# Patient Record
Sex: FEMALE | Race: ASIAN | Employment: OTHER | URBAN - METROPOLITAN AREA
[De-identification: names, ages, dates, MRNs, and addresses within clinical notes are randomized per-mention and may not be internally consistent; named-entity substitution may affect disease eponyms.]

---

## 2017-01-18 ENCOUNTER — HOSPITAL ENCOUNTER (EMERGENCY)
Age: 82
Discharge: HOME | End: 2017-01-18
Attending: EMERGENCY MEDICINE
Payer: MEDICARE

## 2017-01-18 VITALS
DIASTOLIC BLOOD PRESSURE: 60 MMHG | SYSTOLIC BLOOD PRESSURE: 123 MMHG | HEART RATE: 97 BPM | RESPIRATION RATE: 14 BRPM | HEIGHT: 60 IN | TEMPERATURE: 98.8 F | WEIGHT: 89 LBS | OXYGEN SATURATION: 95 % | BODY MASS INDEX: 17.47 KG/M2

## 2017-01-18 DIAGNOSIS — N39.0 URINARY TRACT INFECTION ASSOCIATED WITH INDWELLING URETHRAL CATHETER, INITIAL ENCOUNTER (HCC): ICD-10-CM

## 2017-01-18 DIAGNOSIS — T83.511A URINARY TRACT INFECTION ASSOCIATED WITH INDWELLING URETHRAL CATHETER, INITIAL ENCOUNTER (HCC): ICD-10-CM

## 2017-01-18 DIAGNOSIS — T83.9XXA FOLEY CATHETER PROBLEM, INITIAL ENCOUNTER (HCC): Primary | ICD-10-CM

## 2017-01-18 LAB
AMORPH CRY URNS QL MICRO: ABNORMAL
APPEARANCE UR: ABNORMAL
BACTERIA URNS QL MICRO: ABNORMAL /HPF
BILIRUB UR QL: NEGATIVE
CAOX CRY URNS QL MICRO: ABNORMAL
COLOR UR: ABNORMAL
EPITH CASTS URNS QL MICRO: ABNORMAL /LPF
GLUCOSE UR STRIP.AUTO-MCNC: NEGATIVE MG/DL
GRAN CASTS URNS QL MICRO: ABNORMAL /LPF
HGB UR QL STRIP: NEGATIVE
KETONES UR QL STRIP.AUTO: NEGATIVE MG/DL
LEUKOCYTE ESTERASE UR QL STRIP.AUTO: ABNORMAL
NITRITE UR QL STRIP.AUTO: POSITIVE
PH UR STRIP: 7.5 [PH] (ref 5–8)
PROT UR STRIP-MCNC: NEGATIVE MG/DL
RBC #/AREA URNS HPF: ABNORMAL /HPF (ref 0–5)
SP GR UR REFRACTOMETRY: 1.01 (ref 1–1.03)
UA: UC IF INDICATED,UAUC: ABNORMAL
UROBILINOGEN UR QL STRIP.AUTO: 1 EU/DL (ref 0.2–1)
WBC URNS QL MICRO: ABNORMAL /HPF (ref 0–4)

## 2017-01-18 PROCEDURE — 99282 EMERGENCY DEPT VISIT SF MDM: CPT

## 2017-01-18 PROCEDURE — 87186 SC STD MICRODIL/AGAR DIL: CPT | Performed by: EMERGENCY MEDICINE

## 2017-01-18 PROCEDURE — 51702 INSERT TEMP BLADDER CATH: CPT

## 2017-01-18 PROCEDURE — 77030034850

## 2017-01-18 PROCEDURE — 87086 URINE CULTURE/COLONY COUNT: CPT | Performed by: EMERGENCY MEDICINE

## 2017-01-18 PROCEDURE — 87077 CULTURE AEROBIC IDENTIFY: CPT | Performed by: EMERGENCY MEDICINE

## 2017-01-18 PROCEDURE — 81001 URINALYSIS AUTO W/SCOPE: CPT | Performed by: EMERGENCY MEDICINE

## 2017-01-18 RX ORDER — CIPROFLOXACIN 500 MG/1
500 TABLET ORAL 2 TIMES DAILY
Qty: 20 TAB | Refills: 0 | Status: SHIPPED | OUTPATIENT
Start: 2017-01-18 | End: 2017-01-28

## 2017-01-18 NOTE — Clinical Note
You will need to follow up with own MD for continued management of this catheter. I would recommend you follow up with the urologist with this ongoing gramajo catheter problems.

## 2017-01-18 NOTE — ED TRIAGE NOTES
Triage Note:  Pt arrived with son. Son states that the patient is experiencing pain around her urinary catheter. Pt speaks [place]. Pain began this AM.  Son states the catheter has been in for one year.

## 2017-01-18 NOTE — ED PROVIDER NOTES
HPI Comments: 80 y.o. female with past medical history significant for HTN. Pneumonia, vertigo, GERD, hypercholesterolemia, intracerebral bleed, vancomycin resistant enterococcus culture positive who presents accompanied by son with chief complaint of urinary pain. Per son, pt is experiencing urinary pain secondary to her catheter which has \"been in place for one year. \" Pt's urine is cloudy. Son denies that pt is leaking around catheter. Son denies that pt has recently been seen by nephrology. Son denies fever, abd pain. There are no other acute medical concerns at this time. Old Chart Review:  10:10  Pt has h/o recurrent UTIs. She had her catheter changed in 10/2016 and 12/2016. PCP: Arthur Tabor MD    Note written by Caitlyn Wallace, as dictated by Edith Fragoso MD 10:15 AM      The history is provided by a relative. No  was used. Past Medical History:   Diagnosis Date    Gastrointestinal disorder      GERD    Hypercholesterolemia     Hypertension     Intracerebral bleed ([facility] [address]     Pneumonia, organism unspecified 5/10/2013    Vertigo     VRE (vancomycin resistant enterococcus) culture positive        Past Surgical History:   Procedure Laterality Date    Pr neurological procedure unlisted       brain surgery         History reviewed. No pertinent family history. Social History     Social History    Marital status: SINGLE     Spouse name: N/A    Number of children: N/A    Years of education: N/A     Occupational History    Not on file. Social History Main Topics    Smoking status: Never Smoker    Smokeless tobacco: Not on file    Alcohol use No    Drug use: No    Sexual activity: Not on file     Other Topics Concern    Not on file     Social History Narrative         ALLERGIES: Aspirin    Review of Systems   Constitutional: Negative for activity change, appetite change and fatigue.    HENT: Negative for ear pain, facial swelling, sore throat and trouble swallowing. Eyes: Negative for pain, discharge and visual disturbance. Respiratory: Negative for chest tightness, shortness of breath and wheezing. Cardiovascular: Negative for chest pain and palpitations. Gastrointestinal: Negative for abdominal pain, blood in stool, nausea and vomiting. Genitourinary: Positive for dysuria. Negative for flank pain and hematuria. Musculoskeletal: Negative for arthralgias, joint swelling, myalgias and neck pain. Skin: Negative for color change and rash. Neurological: Negative for dizziness, weakness, numbness and headaches. Hematological: Negative for adenopathy. Does not bruise/bleed easily. Psychiatric/Behavioral: Negative for behavioral problems, confusion and sleep disturbance. All other systems reviewed and are negative. Vitals:    01/18/17 0947   BP: 123/60   Pulse: (!) 105   Resp: 14   Temp: 98.8 °F (37.1 °C)   SpO2: 95%   Weight: 40.4 kg (89 lb)   Height: 5' (1.524 m)            Physical Exam   Constitutional: She is oriented to person, place, and time. She appears well-developed and well-nourished. No distress. HENT:   Head: Normocephalic and atraumatic. Nose: Nose normal.   Mouth/Throat: Oropharynx is clear and moist.   Eyes: Conjunctivae and EOM are normal. Pupils are equal, round, and reactive to light. No scleral icterus. Neck: Normal range of motion. Neck supple. No JVD present. No tracheal deviation present. No thyromegaly present. No carotid bruits noted. Cardiovascular: Normal rate, regular rhythm, normal heart sounds and intact distal pulses. Exam reveals no gallop and no friction rub. No murmur heard. Pulmonary/Chest: Effort normal and breath sounds normal. No respiratory distress. She has no wheezes. She has no rales. She exhibits no tenderness. Abdominal: Soft. Bowel sounds are normal. She exhibits no distension and no mass. There is no tenderness. There is no rebound and no guarding.    Musculoskeletal: Normal range of motion. She exhibits no edema or tenderness. Lymphadenopathy:     She has no cervical adenopathy. Neurological: She is alert and oriented to person, place, and time. She has normal reflexes. No cranial nerve deficit. Coordination normal.   Skin: Skin is warm and dry. No rash noted. No erythema. Psychiatric: She has a normal mood and affect. Her behavior is normal. Judgment and thought content normal.   Nursing note and vitals reviewed. MDM  Number of Diagnoses or Management Options  Chambers catheter problem, initial encounter [facility]): new and requires workup  Urinary tract infection associated with indwelling urethral catheter, initial encounter: new and requires workup     Amount and/or Complexity of Data Reviewed  Clinical lab tests: ordered and reviewed  Decide to obtain previous medical records or to obtain history from someone other than the patient: yes  Obtain history from someone other than the patient: yes  Review and summarize past medical records: yes    Risk of Complications, Morbidity, and/or Mortality  Presenting problems: moderate  Diagnostic procedures: moderate  Management options: moderate    Patient Progress  Patient progress: stable    ED Course       Procedures    PROGRESS NOTE:  10:20 AM  In December, 2016 pt's urine grew serratia that was pan sensitive. Will start the pt on cipro. PROGRESS NOTE:  10:31 AM  UA indicates infection. Pt's catheter has been changed.  She will be d/c with abx and should follow-up with her PCP and Dr. Gabby Santacruz, her nephrologist.

## 2017-01-18 NOTE — ED NOTES
Pt and Son given discharge instructions, patient education, prescriptions, and follow up information. Pt and son state understanding. All questions answered. Pt discharged to home in taxi, pt wheeled out in wheelchair. Pain improved.

## 2017-01-21 LAB
BACTERIA SPEC CULT: NORMAL
CC UR VC: NORMAL
SERVICE CMNT-IMP: NORMAL

## 2017-02-01 ENCOUNTER — HOSPITAL ENCOUNTER (EMERGENCY)
Age: 82
Discharge: HOME | End: 2017-02-01
Attending: EMERGENCY MEDICINE
Payer: MEDICARE

## 2017-02-01 ENCOUNTER — HOME HEALTH ADMISSION (OUTPATIENT)
Dept: HOME HEALTH SERVICES | Facility: HOME HEALTH | Age: 82
End: 2017-02-01

## 2017-02-01 VITALS
BODY MASS INDEX: 17.47 KG/M2 | SYSTOLIC BLOOD PRESSURE: 135 MMHG | OXYGEN SATURATION: 93 % | TEMPERATURE: 98.9 F | HEIGHT: 60 IN | DIASTOLIC BLOOD PRESSURE: 85 MMHG | WEIGHT: 89 LBS | HEART RATE: 79 BPM | RESPIRATION RATE: 18 BRPM

## 2017-02-01 DIAGNOSIS — T83.511S URINARY TRACT INFECTION ASSOCIATED WITH INDWELLING URETHRAL CATHETER, SEQUELA: ICD-10-CM

## 2017-02-01 DIAGNOSIS — Z97.8 CHRONIC INDWELLING FOLEY CATHETER: Primary | ICD-10-CM

## 2017-02-01 DIAGNOSIS — N39.0 URINARY TRACT INFECTION ASSOCIATED WITH INDWELLING URETHRAL CATHETER, SEQUELA: ICD-10-CM

## 2017-02-01 LAB
APPEARANCE UR: ABNORMAL
BACTERIA URNS QL MICRO: ABNORMAL /HPF
BILIRUB UR QL: NEGATIVE
COLOR UR: ABNORMAL
EPITH CASTS URNS QL MICRO: ABNORMAL /LPF
GLUCOSE UR STRIP.AUTO-MCNC: NEGATIVE MG/DL
HGB UR QL STRIP: ABNORMAL
KETONES UR QL STRIP.AUTO: NEGATIVE MG/DL
LEUKOCYTE ESTERASE UR QL STRIP.AUTO: ABNORMAL
NITRITE UR QL STRIP.AUTO: NEGATIVE
PH UR STRIP: 6 [PH] (ref 5–8)
PROT UR STRIP-MCNC: NEGATIVE MG/DL
RBC #/AREA URNS HPF: ABNORMAL /HPF (ref 0–5)
SP GR UR REFRACTOMETRY: 1.02 (ref 1–1.03)
UA: UC IF INDICATED,UAUC: ABNORMAL
UROBILINOGEN UR QL STRIP.AUTO: 0.2 EU/DL (ref 0.2–1)
WBC URNS QL MICRO: ABNORMAL /HPF (ref 0–4)

## 2017-02-01 PROCEDURE — 51702 INSERT TEMP BLADDER CATH: CPT

## 2017-02-01 PROCEDURE — 87186 SC STD MICRODIL/AGAR DIL: CPT | Performed by: EMERGENCY MEDICINE

## 2017-02-01 PROCEDURE — 81001 URINALYSIS AUTO W/SCOPE: CPT | Performed by: EMERGENCY MEDICINE

## 2017-02-01 PROCEDURE — 77030034850

## 2017-02-01 PROCEDURE — 87077 CULTURE AEROBIC IDENTIFY: CPT | Performed by: EMERGENCY MEDICINE

## 2017-02-01 PROCEDURE — 99283 EMERGENCY DEPT VISIT LOW MDM: CPT

## 2017-02-01 PROCEDURE — 87086 URINE CULTURE/COLONY COUNT: CPT | Performed by: EMERGENCY MEDICINE

## 2017-02-01 RX ORDER — CEPHALEXIN 500 MG/1
500 CAPSULE ORAL 2 TIMES DAILY
Qty: 14 CAP | Refills: 0 | Status: SHIPPED | OUTPATIENT
Start: 2017-02-01 | End: 2017-02-08

## 2017-02-01 NOTE — ED PROVIDER NOTES
HPI Comments: 80 y.o. female with past medical history significant for HTN. 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 Appetite with good. Pt denies fevers, chills, night sweats, chest pain, pressure, SOB, SCHMID, PND, orthopnea, abdominal pain, n/v/d, melena, hematuria, dysuria, constipation, HA, dizziness, and syncope      Per nursing this is chronic problem. When pt arrives the gramajo is never in place correctly. Gramajo  is missing. Son is unhelpful with history. Gramajo exchanged on 1/17 with urine growing serratia which was treated with Cipro. Past Medical History:    Gastrointestinal disorder                                       Comment:GERD    Hypercholesterolemia                                          Hypertension                                                  Intracerebral bleed (HCC)                                     Pneumonia, organism unspecified                 5/10/2013     Vertigo                                                       VRE (vancomycin resistant enterococcus) cultur*               Past Surgical History:    [place] NEUROLOGICAL PROCEDURE UNLISTED                               Comment:brain surgery    PCP:  Kendra Valle MD          Patient is a 80 y.o. female presenting with urinary pain. Urinary Pain    Pertinent negatives include no chills, no nausea, no vomiting, no frequency, no hematuria, no urgency, no flank pain, no abdominal pain and no back pain.         Past Medical History:   Diagnosis Date    Gastrointestinal disorder      GERD    Hypercholesterolemia     Hypertension     Intracerebral bleed ([address]     Pneumonia, organism unspecified 5/10/2013    Vertigo     VRE (vancomycin resistant enterococcus) culture positive        Past Surgical History:   Procedure Laterality Date    Pr neurological procedure unlisted       brain surgery         History reviewed. No pertinent family history. Social History     Social History    Marital status: SINGLE     Spouse name: N/A    Number of children: N/A    Years of education: N/A     Occupational History    Not on file. Social History Main Topics    Smoking status: Never Smoker    Smokeless tobacco: Not on file    Alcohol use No    Drug use: No    Sexual activity: Not on file     Other Topics Concern    Not on file     Social History Narrative         ALLERGIES: Aspirin    Review of Systems   Constitutional: Negative for activity change, appetite change, chills, diaphoresis, fatigue, fever and unexpected weight change. HENT: Negative for congestion, ear pain, rhinorrhea, sinus pressure, sore throat and tinnitus. Eyes: Negative for photophobia, pain, discharge and visual disturbance. Respiratory: Negative for apnea, cough, choking, chest tightness, shortness of breath, wheezing and stridor. Cardiovascular: Negative for chest pain, palpitations and leg swelling. Gastrointestinal: Negative for abdominal pain, constipation, diarrhea, nausea and vomiting. Endocrine: Negative for polydipsia, polyphagia and polyuria. Genitourinary: Negative for decreased urine volume, dyspareunia, dysuria, enuresis, flank pain, frequency, hematuria and urgency. Pain around the catheter site   Musculoskeletal: Negative for arthralgias, back pain, gait problem, myalgias and neck pain. Skin: Negative for color change, pallor, rash and wound. Allergic/Immunologic: Negative for immunocompromised state. Neurological: Negative for dizziness, seizures, syncope, weakness, light-headedness and headaches. Hematological: Does not bruise/bleed easily. Psychiatric/Behavioral: Negative for agitation and confusion. The patient is not nervous/anxious.         Vitals:    02/01/17 0913   BP: 120/74   Pulse: 78   Resp: 17 Temp: 98.5 °F (36.9 °C)   SpO2: 94%   Weight: 40.4 kg (89 lb)   Height: 5' (1.524 m)            Physical Exam   Constitutional: She is oriented to person, place, and time. She appears well-developed and well-nourished. No distress. HENT:   Head: Normocephalic. Right Ear: External ear normal.   Left Ear: External ear normal.   Mouth/Throat: Oropharynx is clear and moist. No oropharyngeal exudate. Eyes: Conjunctivae and EOM are normal. Pupils are equal, round, and reactive to light. Right eye exhibits no discharge. Left eye exhibits no discharge. No scleral icterus. Neck: Normal range of motion. Neck supple. No JVD present. No tracheal deviation present. No thyromegaly present. Cardiovascular: Normal rate, regular rhythm, normal heart sounds and intact distal pulses. Exam reveals no gallop and no friction rub. No murmur heard. Pulmonary/Chest: Effort normal and breath sounds normal. No stridor. No respiratory distress. She has no wheezes. She has no rales. She exhibits no tenderness. Abdominal: Soft. Bowel sounds are normal. She exhibits no distension and no mass. There is no tenderness. There is no rebound and no guarding. Genitourinary:   Genitourinary Comments: New gramajo in place with urine flow   Musculoskeletal: Normal range of motion. She exhibits no edema or tenderness. Lymphadenopathy:     She has no cervical adenopathy. Neurological: She is alert and oriented to person, place, and time. She displays normal reflexes. No cranial nerve deficit or sensory deficit. Coordination normal. GCS eye subscore is 4. GCS verbal subscore is 5. GCS motor subscore is 6. Skin: Skin is warm and dry. No rash noted. She is not diaphoretic. No erythema. No pallor. Psychiatric: She has a normal mood and affect. Her behavior is normal. Judgment and thought content normal.   Nursing note and vitals reviewed.        MDM  Number of Diagnoses or Management Options  Chronic indwelling Gramajo catheter:   Urinary tract infection associated with indwelling urethral catheter, sequela:   Diagnosis management comments:    * Chambers exchange   * UA   * consult PCP and senior services       Amount and/or Complexity of Data Reviewed  Clinical lab tests: ordered and reviewed  Review and summarize past medical records: yes  Discuss the patient with other providers: yes    Risk of Complications, Morbidity, and/or Mortality  General comments:    - hemodynamically stable pt in NAD    Patient Progress  Patient progress: stable    ED Course       Procedures    CONSULT NOTE:   10:00 AM  Patricia Mallory NP spoke with Dr. Jesusita Esteves MD,   Specialty: Internal Medicine  Discussed pt's hx, disposition, and available diagnostic and imaging results. Reviewed care plans. Consultant agrees with plans as outlined. Follow up with Urology and consult to senior services. Patricia Mallory NP    CONSULT NOTE:   11:19 AM  Patricia Mallory NP spoke with BOB Williamson,   Specialty: Case management  Discussed pt's hx, disposition, and available diagnostic and imaging results. Reviewed care plans. Consultant agrees with plans as outlined. Home Health nursing to complete teaching in home. Patricia Mallory NP      11:18 AM  Pt has been reevaluated. There are no new complaints, changes, or physical findings at this time. Medications have been reviewed w/ pt and/or family. Pt and/or family's questions have been answered. Pt and/or family expressed good understanding of the dx/tx/rx and is in agreement with plan of care. Pt instructed and agreed to f/u w/ PCP and Urology and to return to ED upon further deterioration. Pt is ready for discharge.     LABORATORY TESTS:  Recent Results (from the past 12 hour(s))   URINALYSIS W/ REFLEX CULTURE    Collection Time: 02/01/17  9:30 AM   Result Value Ref Range    Color YELLOW/STRAW      Appearance CLOUDY (A) CLEAR      Specific gravity 1.018 1.003 - 1.030      pH (UA) 6.0 5.0 - 8.0      Protein NEGATIVE NEG mg/dL    Glucose NEGATIVE  NEG mg/dL    Ketone NEGATIVE  NEG mg/dL    Bilirubin NEGATIVE  NEG      Blood TRACE (A) NEG      Urobilinogen 0.2 0.2 - 1.0 EU/dL    Nitrites NEGATIVE  NEG      Leukocyte Esterase MODERATE (A) NEG      WBC 20-50 0 - 4 /hpf    RBC 5-10 0 - 5 /hpf    Epithelial cells MODERATE (A) FEW /lpf    Bacteria 1+ (A) NEG /hpf    UA:UC IF INDICATED URINE CULTURE ORDERED (A) CNI         IMAGING RESULTS:  No orders to display     No results found. MEDICATIONS GIVEN:  Medications - No data to display    IMPRESSION:  1. Chronic indwelling Gramajo catheter    2. Urinary tract infection associated with indwelling urethral catheter, sequela        PLAN:  1. Current Discharge Medication List      START taking these medications    Details   cephALEXin (KEFLEX) 500 mg capsule Take 1 Cap by mouth two (2) times a day for 7 days. Qty: 14 Cap, Refills: 0         CONTINUE these medications which have NOT CHANGED    Details   fluconazole (DIFLUCAN) 100 mg tablet Take 2 tablets for 1 dose, then 1 tablet each day for 7 days. Qty: 9 Tab, Refills: 0      gabapentin (NEURONTIN) 100 mg capsule Take 100 mg by mouth three (3) times daily. docusate sodium (COLACE) 100 mg capsule Take 100 mg by mouth as needed for Constipation. acetaminophen (TYLENOL) 325 mg tablet Take 2 Tabs by mouth every four (4) hours as needed for Fever. Qty: 40 Tab, Refills: 0      amLODIPine (NORVASC) 5 mg tablet Take 1 Tab by mouth daily. Qty: 30 Tab, Refills: 0      omeprazole (PRILOSEC) 20 mg capsule Take 20 mg by mouth daily. 2.   Follow-up Information     Follow up With Details Comments Contact Info    Nickolas Norris MD In 2 days  [address] [place] [address]      Shahnaz Ledesma MD In 2 days  [address]  [place] [address] [phone]          3.  Supportive care - Home Healthcare RN to complete gramajo catheter care and teaching in home    Return to ED if worse Tariq David NP  11:18 AM

## 2017-02-01 NOTE — PROGRESS NOTES
SSED/CM consult received and appreciated. EMR reviewed. History significant for HTN, pneumonia, vertigo, GERD, hypercholesterolemia, intracranial bleed, and VRE. Patient presents to the ED w/ urinary pain. Case discussed w/ NN - Jayden Trujillo - call placed to [facility] (1/17) regarding lack of follow up w/ PCP and Urology appointments. Met w/patient and Sa Lowe - introduced to role of CM. Ladi Galicia is caregiver for his mother. Demographic information confirmed. CM informed son of HH order. Provided list of agencies, freedom letter signed, patient has been followed by [facility] in the past and would like referral sent to agency. Referral sent via CC. DME includes w/c at home. Sa Chrissy requesting cab ride home and will pay for transport. Case accepted by [facility] and will follow patient. [place] and [place] are [place] providers. Veronica Rudolph is PCP. Care Management Interventions  PCP Verified by CM: Yes  Last Visit to PCP: 10/03/16  Mode of Transport at Discharge:  Other (see comment) (vehicle)  Hospital Transport Time of Discharge: 0904  Transition of Care Consult (CM Consult): [address]: Yes  MyChart Signup: No  Discharge Durable Medical Equipment: No  Physical Therapy Consult: No  Occupational Therapy Consult: No  Speech Therapy Consult: No  Current Support Network: Own Home, Lives with Caregiver  Confirm Follow Up Transport: Cab  Plan discussed with Pt/Family/Caregiver: Yes  Freedom of Choice Offered: Yes  Discharge Location  Discharge Placement: Home with home health

## 2017-02-01 NOTE — DISCHARGE INSTRUCTIONS
Urinary Tract Infection in Women: Care Instructions  Your Care Instructions    A urinary tract infection, or UTI, is a general term for an infection anywhere between the kidneys and the urethra (where urine comes out). Most UTIs are bladder infections. They often cause pain or burning when you urinate. UTIs are caused by bacteria and can be cured with antibiotics. Be sure to complete your treatment so that the infection goes away. Follow-up care is a key part of your treatment and safety. Be sure to make and go to all appointments, and call your doctor if you are having problems. It's also a good idea to know your test results and keep a list of the medicines you take. How can you care for yourself at home? · Take your antibiotics as directed. Do not stop taking them just because you feel better. You need to take the full course of antibiotics. · Drink extra water and other fluids for the next day or two. This may help wash out the bacteria that are causing the infection. (If you have kidney, heart, or liver disease and have to limit fluids, talk with your doctor before you increase your fluid intake.)  · Avoid drinks that are carbonated or have caffeine. They can irritate the bladder. · Urinate often. Try to empty your bladder each time. · To relieve pain, take a hot bath or lay a heating pad set on low over your lower belly or genital area. Never go to sleep with a heating pad in place. To prevent UTIs  · Drink plenty of water each day. This helps you urinate often, which clears bacteria from your system. (If you have kidney, heart, or liver disease and have to limit fluids, talk with your doctor before you increase your fluid intake.)  · Consider adding cranberry juice to your diet. · Urinate when you need to. · Urinate right after you have sex. · Change sanitary pads often. · Avoid douches, bubble baths, feminine hygiene sprays, and other feminine hygiene products that have deodorants.   · After going to the bathroom, wipe from front to back. When should you call for help? Call your doctor now or seek immediate medical care if:  · Symptoms such as fever, chills, nausea, or vomiting get worse or appear for the first time. · You have new pain in your back just below your rib cage. This is called flank pain. · There is new blood or pus in your urine. · You have any problems with your antibiotic medicine. Watch closely for changes in your health, and be sure to contact your doctor if:  · You are not getting better after taking an antibiotic for 2 days. · Your symptoms go away but then come back. Where can you learn more? Go to http://jose-cheyanne.info/. Enter N000 in the search box to learn more about \"Urinary Tract Infection in Women: Care Instructions. \"  Current as of: August 12, 2016  Content Version: 11.1  © 9469-2596 [employer]. Care instructions adapted under license by [employer] (which disclaims liability or warranty for this information). If you have questions about a medical condition or this instruction, always ask your healthcare professional. [place] [address] any warranty or liability for your use of this information. We hope that we have addressed all of your medical concerns. The examination and treatment you received in the Emergency Department were for an emergent problem and were not intended as complete care. It is important that you follow up with your healthcare provider(s) for ongoing care. If your symptoms worsen or do not improve as expected, and you are unable to reach your usual health care provider(s), you should return to the Emergency Department. Today's healthcare is undergoing tremendous change, and patient satisfaction surveys are one of the many tools to assess the quality of medical care.   You may receive a survey from the [employer] regarding your experience in the Emergency Department. I hope that your experience has been completely positive, particularly the medical care that I provided. As such, please participate in the survey; anything less than excellent does not meet my expectations or intentions. [facility] and [employer] participate in nationally recognized quality of care measures. If your blood pressure is greater than 120/80, as reported below, we urge that you seek medical care to address the potential of high blood pressure, commonly known as hypertension. Hypertension can be hereditary or can be caused by certain medical conditions, pain, stress, or \"white coat syndrome. \"       Please make an appointment with your health care provider(s) for follow up of your Emergency Department visit. VITALS:   Patient Vitals for the past 8 hrs:   Temp Pulse Resp BP SpO2   02/01/17 0913 98.5 °F (36.9 °C) 78 17 120/74 94 %          Thank you for allowing us to provide you with medical care today. We realize that you have many choices for your emergency care needs. Please choose us in the future for any continued health care needs. Bettina Dawkins [place] [address].   Office: [phone]            Recent Results (from the past 24 hour(s))   URINALYSIS W/ REFLEX CULTURE    Collection Time: 02/01/17  9:30 AM   Result Value Ref Range    Color YELLOW/STRAW      Appearance CLOUDY (A) CLEAR      Specific gravity 1.018 1.003 - 1.030      pH (UA) 6.0 5.0 - 8.0      Protein NEGATIVE  NEG mg/dL    Glucose NEGATIVE  NEG mg/dL    Ketone NEGATIVE  NEG mg/dL    Bilirubin NEGATIVE  NEG      Blood TRACE (A) NEG      Urobilinogen 0.2 0.2 - 1.0 EU/dL    Nitrites NEGATIVE  NEG      Leukocyte Esterase MODERATE (A) NEG      WBC 20-50 0 - 4 /hpf    RBC 5-10 0 - 5 /hpf    Epithelial cells MODERATE (A) FEW /lpf    Bacteria 1+ (A) NEG /hpf    UA:UC IF INDICATED URINE CULTURE ORDERED (A) CNI No results found.

## 2017-02-03 ENCOUNTER — HOME CARE VISIT (OUTPATIENT)
Dept: SCHEDULING | Facility: HOME HEALTH | Age: 82
End: 2017-02-03

## 2017-02-03 LAB
BACTERIA SPEC CULT: NORMAL
CC UR VC: NORMAL
SERVICE CMNT-IMP: NORMAL

## 2017-02-03 NOTE — ED NOTES
11:46 AM:  Lab called stating that the pt urine culture grew VRE. Called in rx of macrobid. Will attempt to contact pt and inform that rx has been called into her pharmacy.   Yue Bliss PA-C

## 2017-02-04 NOTE — PROGRESS NOTES
Spoke with patient son. Informed to stop keflex.  TRACIE Escobedo has called in rx for macrobid to pharmacy on record

## 2017-02-05 ENCOUNTER — HOME CARE VISIT (OUTPATIENT)
Dept: HOME HEALTH SERVICES | Facility: HOME HEALTH | Age: 82
End: 2017-02-05

## 2017-02-16 ENCOUNTER — HOME HEALTH ADMISSION (OUTPATIENT)
Dept: HOME HEALTH SERVICES | Facility: HOME HEALTH | Age: 82
End: 2017-02-16
Payer: MEDICARE

## 2017-02-16 ENCOUNTER — APPOINTMENT (OUTPATIENT)
Dept: CT IMAGING | Age: 82
End: 2017-02-16
Attending: EMERGENCY MEDICINE
Payer: MEDICARE

## 2017-02-16 ENCOUNTER — HOSPITAL ENCOUNTER (EMERGENCY)
Age: 82
Discharge: HOME | End: 2017-02-16
Attending: EMERGENCY MEDICINE
Payer: MEDICARE

## 2017-02-16 VITALS
WEIGHT: 89 LBS | OXYGEN SATURATION: 90 % | DIASTOLIC BLOOD PRESSURE: 85 MMHG | BODY MASS INDEX: 17.47 KG/M2 | HEIGHT: 60 IN | TEMPERATURE: 98 F | SYSTOLIC BLOOD PRESSURE: 123 MMHG | RESPIRATION RATE: 16 BRPM | HEART RATE: 72 BPM

## 2017-02-16 DIAGNOSIS — R10.2 ABDOMINAL PAIN, SUPRAPUBIC: ICD-10-CM

## 2017-02-16 DIAGNOSIS — N39.0 URINARY TRACT INFECTION ASSOCIATED WITH CATHETERIZATION OF URINARY TRACT, UNSPECIFIED INDWELLING URINARY CATHETER TYPE, INITIAL ENCOUNTER (HCC): Primary | ICD-10-CM

## 2017-02-16 DIAGNOSIS — T83.511A URINARY TRACT INFECTION ASSOCIATED WITH CATHETERIZATION OF URINARY TRACT, UNSPECIFIED INDWELLING URINARY CATHETER TYPE, INITIAL ENCOUNTER (HCC): Primary | ICD-10-CM

## 2017-02-16 LAB
ALBUMIN SERPL BCP-MCNC: 3.4 G/DL (ref 3.5–5)
ALBUMIN/GLOB SERPL: 0.9 {RATIO} (ref 1.1–2.2)
ALP SERPL-CCNC: 110 U/L (ref 45–117)
ALT SERPL-CCNC: 25 U/L (ref 12–78)
ANION GAP BLD CALC-SCNC: 11 MMOL/L (ref 5–15)
ANION GAP BLD CALC-SCNC: 17 MMOL/L (ref 5–15)
AST SERPL W P-5'-P-CCNC: 36 U/L (ref 15–37)
BASOPHILS # BLD AUTO: 0.1 K/UL (ref 0–0.1)
BASOPHILS # BLD: 1 % (ref 0–1)
BILIRUB SERPL-MCNC: 1 MG/DL (ref 0.2–1)
BUN BLD-MCNC: 11 MG/DL (ref 9–20)
BUN SERPL-MCNC: 11 MG/DL (ref 6–20)
BUN/CREAT SERPL: 22 (ref 12–20)
CA-I BLD-MCNC: 1.06 MMOL/L (ref 1.12–1.32)
CALCIUM SERPL-MCNC: 8.6 MG/DL (ref 8.5–10.1)
CHLORIDE BLD-SCNC: 98 MMOL/L (ref 98–107)
CHLORIDE SERPL-SCNC: 96 MMOL/L (ref 97–108)
CO2 BLD-SCNC: 30 MMOL/L (ref 21–32)
CO2 SERPL-SCNC: 30 MMOL/L (ref 21–32)
CREAT BLD-MCNC: 0.6 MG/DL (ref 0.6–1.3)
CREAT SERPL-MCNC: 0.5 MG/DL (ref 0.55–1.02)
EOSINOPHIL # BLD: 0.7 K/UL (ref 0–0.4)
EOSINOPHIL NFR BLD: 8 % (ref 0–7)
ERYTHROCYTE [DISTWIDTH] IN BLOOD BY AUTOMATED COUNT: 14.8 % (ref 11.5–14.5)
GLOBULIN SER CALC-MCNC: 3.8 G/DL (ref 2–4)
GLUCOSE BLD-MCNC: 104 MG/DL (ref 65–105)
GLUCOSE SERPL-MCNC: 103 MG/DL (ref 65–100)
HCT VFR BLD AUTO: 36.4 % (ref 35–47)
HCT VFR BLD CALC: 34 % (ref 35–47)
HGB BLD-MCNC: 11.6 GM/DL (ref 11.5–16)
HGB BLD-MCNC: 12.1 G/DL (ref 11.5–16)
LYMPHOCYTES # BLD AUTO: 12 % (ref 12–49)
LYMPHOCYTES # BLD: 0.9 K/UL (ref 0.8–3.5)
MCH RBC QN AUTO: 25.4 PG (ref 26–34)
MCHC RBC AUTO-ENTMCNC: 33.2 G/DL (ref 30–36.5)
MCV RBC AUTO: 76.5 FL (ref 80–99)
MONOCYTES # BLD: 0.5 K/UL (ref 0–1)
MONOCYTES NFR BLD AUTO: 6 % (ref 5–13)
NEUTS SEG # BLD: 5.7 K/UL (ref 1.8–8)
NEUTS SEG NFR BLD AUTO: 73 % (ref 32–75)
PLATELET # BLD AUTO: 174 K/UL (ref 150–400)
POTASSIUM BLD-SCNC: 3.1 MMOL/L (ref 3.5–5.1)
POTASSIUM SERPL-SCNC: 3.1 MMOL/L (ref 3.5–5.1)
PROT SERPL-MCNC: 7.2 G/DL (ref 6.4–8.2)
RBC # BLD AUTO: 4.76 M/UL (ref 3.8–5.2)
SERVICE CMNT-IMP: ABNORMAL
SODIUM BLD-SCNC: 141 MMOL/L (ref 136–145)
SODIUM SERPL-SCNC: 137 MMOL/L (ref 136–145)
WBC # BLD AUTO: 7.8 K/UL (ref 3.6–11)

## 2017-02-16 PROCEDURE — 74011636320 HC RX REV CODE- 636/320: Performed by: EMERGENCY MEDICINE

## 2017-02-16 PROCEDURE — 77030005514 HC CATH URETH FOL14 BARD -A

## 2017-02-16 PROCEDURE — 87186 SC STD MICRODIL/AGAR DIL: CPT | Performed by: EMERGENCY MEDICINE

## 2017-02-16 PROCEDURE — 74177 CT ABD & PELVIS W/CONTRAST: CPT

## 2017-02-16 PROCEDURE — 80053 COMPREHEN METABOLIC PANEL: CPT | Performed by: EMERGENCY MEDICINE

## 2017-02-16 PROCEDURE — 36415 COLL VENOUS BLD VENIPUNCTURE: CPT | Performed by: EMERGENCY MEDICINE

## 2017-02-16 PROCEDURE — 74011000258 HC RX REV CODE- 258: Performed by: EMERGENCY MEDICINE

## 2017-02-16 PROCEDURE — 87077 CULTURE AEROBIC IDENTIFY: CPT | Performed by: EMERGENCY MEDICINE

## 2017-02-16 PROCEDURE — 87086 URINE CULTURE/COLONY COUNT: CPT | Performed by: EMERGENCY MEDICINE

## 2017-02-16 PROCEDURE — 74011250637 HC RX REV CODE- 250/637: Performed by: EMERGENCY MEDICINE

## 2017-02-16 PROCEDURE — 99285 EMERGENCY DEPT VISIT HI MDM: CPT

## 2017-02-16 PROCEDURE — 80047 BASIC METABLC PNL IONIZED CA: CPT

## 2017-02-16 PROCEDURE — 51702 INSERT TEMP BLADDER CATH: CPT

## 2017-02-16 PROCEDURE — 85025 COMPLETE CBC W/AUTO DIFF WBC: CPT | Performed by: EMERGENCY MEDICINE

## 2017-02-16 RX ORDER — POTASSIUM CHLORIDE 750 MG/1
40 TABLET, FILM COATED, EXTENDED RELEASE ORAL
Status: COMPLETED | OUTPATIENT
Start: 2017-02-16 | End: 2017-02-16

## 2017-02-16 RX ORDER — SODIUM CHLORIDE 0.9 % (FLUSH) 0.9 %
10 SYRINGE (ML) INJECTION
Status: COMPLETED | OUTPATIENT
Start: 2017-02-16 | End: 2017-02-16

## 2017-02-16 RX ADMIN — Medication 10 ML: at 13:30

## 2017-02-16 RX ADMIN — IOPAMIDOL 85 ML: 755 INJECTION, SOLUTION INTRAVENOUS at 13:30

## 2017-02-16 RX ADMIN — POTASSIUM CHLORIDE 40 MEQ: 750 TABLET, FILM COATED, EXTENDED RELEASE ORAL at 15:22

## 2017-02-16 RX ADMIN — SODIUM CHLORIDE 100 ML: 900 INJECTION, SOLUTION INTRAVENOUS at 13:30

## 2017-02-16 NOTE — ED PROVIDER NOTES
HPI Comments: 80 y.o. female with past medical history significant for hypertension, pneumonia, vertigo, GERD, hypercholesterolemia, intracerebral bleed and VRE culture positive who presents via EMS with chief complaint of pelvic pain. Patient presents today accompanied by son for left sided suprapubic abdominal pain. 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 Per son, patient denies cough, shortness of breath, weight loss, constipation, fever or change in appetite. There are no other acute medical concerns at this time. Social hx: Nonsmoker, no alcohol use  PCP: Kelly Hutchinson MD    Note written by fadia Man, as dictated by Svetlana Stover MD 10:14 AM        The history is provided by a relative. The history is limited by a language barrier. No  was used. Past Medical History:   Diagnosis Date    Gastrointestinal disorder      GERD    Hypercholesterolemia     Hypertension     Intracerebral bleed ([address]     Pneumonia, organism unspecified 5/10/2013    Vertigo     VRE (vancomycin resistant enterococcus) culture positive        Past Surgical History:   Procedure Laterality Date    Pr neurological procedure unlisted       brain surgery         History reviewed. No pertinent family history. Social History     Social History    Marital status: SINGLE     Spouse name: N/A    Number of children: N/A    Years of education: N/A     Occupational History    Not on file. Social History Main Topics    Smoking status: Never Smoker    Smokeless tobacco: Not on file    Alcohol use No    Drug use: No    Sexual activity: Not on file     Other Topics Concern    Not on file     Social History Narrative         ALLERGIES: Aspirin    Review of Systems   Constitutional: Negative for appetite change, chills, fever and unexpected weight change. HENT: Negative for congestion.     Eyes: Negative for visual disturbance. Respiratory: Negative for cough, chest tightness, shortness of breath and wheezing. Cardiovascular: Negative for chest pain. Gastrointestinal: Positive for abdominal pain. Negative for diarrhea and vomiting. Genitourinary: Positive for pelvic pain. Negative for dysuria and frequency. Musculoskeletal: Negative for joint swelling. Skin: Negative for rash. Neurological: Negative for speech difficulty and headaches. All other systems reviewed and are negative. Vitals:    02/16/17 0923   BP: 128/63   Pulse: 80   Resp: 16   Temp: 98.2 °F (36.8 °C)   SpO2: 93%   Weight: 40.4 kg (89 lb)   Height: 5' (1.524 m)            Physical Exam   Constitutional: She is oriented to person, place, and time. She appears well-developed and well-nourished. No distress. HENT:   Head: Normocephalic and atraumatic. Nose: Nose normal.   Eyes: Conjunctivae are normal. Pupils are equal, round, and reactive to light. No scleral icterus. Neck: Normal range of motion. Neck supple. No JVD present. No tracheal deviation present. No thyromegaly present. Cardiovascular: Normal rate, regular rhythm and normal heart sounds. No murmur heard. Pulmonary/Chest: Effort normal and breath sounds normal. No respiratory distress. She has no wheezes. She has no rales. Abdominal: Soft. Bowel sounds are normal. She exhibits no mass. There is tenderness (Left sided suprapubic/abdominal tenderness). There is no rebound and no guarding. Musculoskeletal: Normal range of motion. She exhibits no edema. Neurological: She is alert and oriented to person, place, and time. No cranial nerve deficit. Coordination normal.   Skin: Skin is warm and dry. No rash noted. She is not diaphoretic. No erythema. Psychiatric: She has a normal mood and affect. Her behavior is normal.   Nursing note and vitals reviewed.   Note written by fadia Christianson, as dictated by Juliet Chilel MD 10:16 AM      [facility]  ED Course Procedures

## 2017-02-16 NOTE — ED NOTES
Dr. Chepe Schultz gave and reviewed discharge instructions with patient. Patient verbalizes understanding of discharge instructions. Pt alert and oriented, appears in no acute distress, respirations equal and unlabored. Patient wheeled to discharge.

## 2017-02-16 NOTE — PROGRESS NOTES
SSED/CM consult received and appreciated. EMR reviewed. History significant for UTI, intracranial bleed, compression fracture, vertigo, hypernatremia, and indwelling catheter. Patient presents to the ED w/ pelvic pain. Patient and son Karuna Staples know to this writer. Patient speaks [place]ian [place] and son is caregiver.  seen by this writer on 2/1/17 and order received for HH/ RN and SW. Contact made w/ agency noted attempts on 2/3 and 2/5 trying to reach Karuna Staples by phone case closed out. Patient will need new HH order and RN can visit on 2/19/17. CM received order and sent via CC to [facility]. Case discussed w/ [facility] - will visit patient and son face 2 face and discuss services. Recommendation for patient to have PCP and Urologist appointments set up. This writer provided assistance - call placed to [address] [place] [address] informed of walk-in M-Th 8A-5P and F 9A-5P. Alternative call placed to [facility] spoke w/ [place] provided updates noted in system patient has had 10 cancellations / no-shows in past 6 months. CM noted 10 ED visits in past 6 months. APS report called 1/17 by SSED/Nurse Navigator - concerns about patient's caregiving needs and medical follow up - case did not meet criteria for agency to follow. Appointment obtained for 2/24/17 at [facility] . CM added appointments to AVS and updates provided to Dr. Demetrio Olson. Sa Chrissy requesting cab ride and will pay for transport. CM noted patient has medicaid - call placed to [facility] spoke to Elier Iglesias to request cab ride - Sa Chrissy to accompany and w/c at residence - will assist patient getting into home. CM requested trip for [phone] / reference [id number]. Provided unit # and CM number as secondary - asked  to call unit before arrival at ED. Noted order for CT. Updates provided to Hugo Abdalla. [facility] Medicare A[place]B and [place] of [place] are insurance providers.     Care Management Interventions  PCP Verified by CM:  Yes  Last Visit to PCP: 10/12/16  Mode of Transport at Discharge: [address]  [address] Time of Discharge: 0919  Transition of Care Consult (CM Consult): [address]: Yes  MyChart Signup: No  Discharge Durable Medical Equipment: No  Physical Therapy Consult: No  Occupational Therapy Consult: No  Speech Therapy Consult: No  Current Support Network: Own Home, Lives with Caregiver  Confirm Follow Up Transport: Cab  Plan discussed with Pt/Family/Caregiver: Yes  Freedom of Choice Offered: Yes  Discharge Location  Discharge Placement: Home with home health

## 2017-02-16 NOTE — SENIOR SERVICES NOTE
Call placed to [address] regarding status of ride. Spoke w/ JENNIFER Quiros informed trip may take up to 3 hours to be scheduled. Original requested time 1230 [employer] has until 1530 to assign provider. Updates provided to Nursing. CM met w/ patient and Sa Lowe - provided printed appointment information for [facility] on Friday 2/17/17 @ 0930 and Dr. Camilla Calzada 2/24/17 @ 0930 - informed of walk-in at PCP office. Son verbalized appointment dates/times to this writer. Informed of pending transportation through [employer] - son requesting [facility] for sooner ride. Call placed to veterans ETA 1530.

## 2017-02-16 NOTE — DISCHARGE INSTRUCTIONS
Pelvic Pain: Care Instructions  Your Care Instructions    Pelvic pain, or pain in the lower belly, can have many causes. Often pelvic pain is not serious and gets better in a few days. If your pain continues or gets worse, you may need tests and treatment. Tell your doctor about any new symptoms. These may be signs of a serious problem. Follow-up care is a key part of your treatment and safety. Be sure to make and go to all appointments, and call your doctor if you are having problems. It's also a good idea to know your test results and keep a list of the medicines you take. How can you care for yourself at home? · Rest until you feel better. Lie down, and raise your legs by placing a pillow under your knees. · Drink plenty of fluids. You may find that small, frequent sips are easier on your stomach than if you drink a lot at once. Avoid drinks with carbonation or caffeine, such as soda pop, tea, or coffee. · Try eating several small meals instead of 2 or 3 large ones. Eat mild foods, such as rice, dry toast or crackers, bananas, and applesauce. Avoid fatty and spicy foods, other fruits, and alcohol until 48 hours after your symptoms have gone away. · Take an over-the-counter pain medicine, such as acetaminophen (Tylenol), ibuprofen (Advil, Motrin), or naproxen (Aleve). Read and follow all instructions on the label. · Do not take two or more pain medicines at the same time unless the doctor told you to. Many pain medicines have acetaminophen, which is Tylenol. Too much acetaminophen (Tylenol) can be harmful. · You can put a heating pad, a warm cloth, or moist heat on your belly to relieve pain. When should you call for help? Call 911 anytime you think you may need emergency care. For example, call if:  · You passed out (lost consciousness). Call your doctor now or seek immediate medical care if:  · Your pain is getting worse. · Your pain becomes focused in one area of your belly.   · You have severe vaginal bleeding. Severe means that you are soaking through your usual pads or tampons every hour for 2 or more hours and passing clots of blood. · You have new symptoms such as fever, urinary problems or unexpected vaginal bleeding. · You are dizzy or lightheaded, or you feel like you may faint. Watch closely for changes in your health, and be sure to contact your doctor if:  · You do not get better as expected. Where can you learn more? Go to http://jose-cheyanne.info/. Enter [phone] in the search box to learn more about \"Pelvic Pain: Care Instructions. \"  Current as of: February 25, 2016  Content Version: 11.1  © 7796-5863 [employer]. Care instructions adapted under license by [employer] (which disclaims liability or warranty for this information). If you have questions about a medical condition or this instruction, always ask your healthcare professional. [place] [address] any warranty or liability for your use of this information. We hope that we have addressed all of your medical concerns. The examination and treatment you received in the Emergency Department were for an emergent problem and were not intended as complete care. It is important that you follow up with your healthcare provider(s) for ongoing care. If your symptoms worsen or do not improve as expected, and you are unable to reach your usual health care provider(s), you should return to the Emergency Department. Today's healthcare is undergoing tremendous change, and patient satisfaction surveys are one of the many tools to assess the quality of medical care. You may receive a survey from the [employer] organization regarding your experience in the Emergency Department. I hope that your experience has been completely positive, particularly the medical care that I provided.   As such, please participate in the survey; anything less than excellent does not meet my expectations or intentions. [facility] and [facility] participate in nationally recognized quality of care measures. If your blood pressure is greater than 120/80, as reported below, we urge that you seek medical care to address the potential of high blood pressure, commonly known as hypertension. Hypertension can be hereditary or can be caused by certain medical conditions, pain, stress, or \"white coat syndrome. \"       Please make an appointment with your health care provider(s) for follow up of your Emergency Department visit. VITALS:   Patient Vitals for the past 8 hrs:   Temp Pulse Resp BP SpO2   02/16/17 1300 - - - 132/51 91 %   02/16/17 1230 - - - 116/63 92 %   02/16/17 1200 - - - 115/61 (!) 88 %   02/16/17 1130 - - - 126/62 91 %   02/16/17 1100 - - - 111/57 91 %   02/16/17 1030 - - - 119/63 92 %   02/16/17 0930 - - - 131/63 92 %   02/16/17 0923 98.2 °F (36.8 °C) 80 16 128/63 93 %          Thank you for allowing us to provide you with medical care today. We realize that you have many choices for your emergency care needs. Please choose us in the future for any continued health care needs. [facility], [address] [place], [place] [address]y 20.   Office: [phone]            Recent Results (from the past 24 hour(s))   POC CHEM8    Collection Time: 02/16/17 12:51 PM   Result Value Ref Range    Calcium, ionized (POC) 1.06 (L) 1.12 - 1.32 MMOL/L    Sodium (POC) 141 136 - 145 MMOL/L    Potassium (POC) 3.1 (L) 3.5 - 5.1 MMOL/L    Chloride (POC) 98 98 - 107 MMOL/L    CO2 (POC) 30 21 - 32 MMOL/L    Anion gap (POC) 17 (H) 5 - 15 mmol/L    Glucose (POC) 104 65 - 105 MG/DL    BUN (POC) 11 9 - 20 MG/DL    Creatinine (POC) 0.6 0.6 - 1.3 MG/DL    GFR-AA (POC) >60 >60 ml/min/1.73m2    GFR, non-AA (POC) >60 >60 ml/min/1.73m2    Hemoglobin (POC) 11.6 11.5 - 16.0 GM/DL    Hematocrit (POC) 34 (L) 35.0 - 47.0 %    Comment Comment Not Indicated. CBC WITH AUTOMATED DIFF    Collection Time: 02/16/17  2:12 PM   Result Value Ref Range    WBC 7.8 3.6 - 11.0 K/uL    RBC 4.76 3.80 - 5.20 M/uL    HGB 12.1 11.5 - 16.0 g/dL    HCT 36.4 35.0 - 47.0 %    MCV 76.5 (L) 80.0 - 99.0 FL    MCH 25.4 (L) 26.0 - 34.0 PG    MCHC 33.2 30.0 - 36.5 g/dL    RDW 14.8 (H) 11.5 - 14.5 %    PLATELET 046 939 - 698 K/uL    NEUTROPHILS 73 32 - 75 %    LYMPHOCYTES 12 12 - 49 %    MONOCYTES 6 5 - 13 %    EOSINOPHILS 8 (H) 0 - 7 %    BASOPHILS 1 0 - 1 %    ABS. NEUTROPHILS 5.7 1.8 - 8.0 K/UL    ABS. LYMPHOCYTES 0.9 0.8 - 3.5 K/UL    ABS. MONOCYTES 0.5 0.0 - 1.0 K/UL    ABS. EOSINOPHILS 0.7 (H) 0.0 - 0.4 K/UL    ABS. BASOPHILS 0.1 0.0 - 0.1 K/UL       Ct Abd Pelv W Cont    Result Date: 2/16/2017  INDICATION: Abdominal pain; recurrent left and suprapubic pain  pubic pain with indwelling catheter COMPARISON: June 13, 2016 TECHNIQUE: Following the uneventful intravenous administration of 85 cc Isovue-370, thin axial images were obtained through the abdomen and pelvis. Coronal and sagittal reconstructions were generated. Oral contrast was not administered. CT dose reduction was achieved through use of a standardized protocol tailored for this examination and automatic exposure control for dose modulation. Adaptive statistical iterative reconstruction (ASIR) was utilized. FINDINGS: LUNG BASES: There is bilateral lower lobe interstitial lung disease and bronchiectasis. INCIDENTALLY IMAGED HEART AND MEDIASTINUM: There is four-chamber cardiac enlargement. LIVER: No mass or biliary dilatation. The liver is nodular. GALLBLADDER: There is cholelithiasis. SPLEEN: Splenomegaly PANCREAS: No mass or ductal dilatation. ADRENALS: Unremarkable. KIDNEYS: Left nephrolithiasis. STOMACH: Unremarkable. SMALL BOWEL: No dilatation or wall thickening. COLON: No dilatation or wall thickening. APPENDIX: Unremarkable. PERITONEUM: No ascites or pneumoperitoneum.  RETROPERITONEUM: No lymphadenopathy or aortic aneurysm. REPRODUCTIVE ORGANS: Normal URINARY BLADDER: Large bladder calculi. Catheter in place with intravesical air. BONES: There is a stable L1 compression deformity and kyphoplasty of L4. ADDITIONAL COMMENTS: There is significant portal venous shunt collaterals. IMPRESSION: 1. No significant change. 2. Indwelling bladder catheter with intravesical air in bladder calculi. 3. Cirrhosis, splenomegaly, and portal venous collaterals. 4. Interstitial lung disease. 5. Left nephrolithiasis.

## 2017-02-18 ENCOUNTER — HOME CARE VISIT (OUTPATIENT)
Dept: SCHEDULING | Facility: HOME HEALTH | Age: 82
End: 2017-02-18
Payer: MEDICARE

## 2017-02-18 VITALS
OXYGEN SATURATION: 98 % | RESPIRATION RATE: 18 BRPM | TEMPERATURE: 98.2 F | DIASTOLIC BLOOD PRESSURE: 64 MMHG | SYSTOLIC BLOOD PRESSURE: 120 MMHG | HEART RATE: 72 BPM

## 2017-02-18 PROCEDURE — 3331090002 HH PPS REVENUE DEBIT

## 2017-02-18 PROCEDURE — G0299 HHS/HOSPICE OF RN EA 15 MIN: HCPCS

## 2017-02-18 PROCEDURE — 400013 HH SOC

## 2017-02-18 PROCEDURE — 3331090001 HH PPS REVENUE CREDIT

## 2017-02-18 PROCEDURE — 3331090003 HH PPS REVENUE ADJ

## 2017-02-19 LAB
BACTERIA SPEC CULT: ABNORMAL
BACTERIA SPEC CULT: ABNORMAL
CC UR VC: ABNORMAL
SERVICE CMNT-IMP: ABNORMAL

## 2017-02-19 PROCEDURE — 3331090001 HH PPS REVENUE CREDIT

## 2017-02-19 PROCEDURE — 3331090002 HH PPS REVENUE DEBIT

## 2017-02-20 ENCOUNTER — HOME CARE VISIT (OUTPATIENT)
Dept: HOME HEALTH SERVICES | Facility: HOME HEALTH | Age: 82
End: 2017-02-20
Payer: MEDICARE

## 2017-02-20 PROCEDURE — 3331090002 HH PPS REVENUE DEBIT

## 2017-02-20 PROCEDURE — 3331090001 HH PPS REVENUE CREDIT

## 2017-02-21 ENCOUNTER — HOME CARE VISIT (OUTPATIENT)
Dept: SCHEDULING | Facility: HOME HEALTH | Age: 82
End: 2017-02-21
Payer: MEDICARE

## 2017-02-21 PROCEDURE — 3331090002 HH PPS REVENUE DEBIT

## 2017-02-21 PROCEDURE — 3331090001 HH PPS REVENUE CREDIT

## 2017-02-21 RX ORDER — NITROFURANTOIN 25; 75 MG/1; MG/1
100 CAPSULE ORAL 2 TIMES DAILY
Qty: 14 CAP | Refills: 0 | Status: SHIPPED | OUTPATIENT
Start: 2017-02-21 | End: 2017-02-28

## 2017-02-21 NOTE — PROGRESS NOTES
Attempted to reach patient son. Message left for call back concerning culture result and need for treatment.   P: macrobid 100 mg bid x 7 d to [facility]

## 2017-02-22 PROCEDURE — 3331090001 HH PPS REVENUE CREDIT

## 2017-02-22 PROCEDURE — 3331090002 HH PPS REVENUE DEBIT

## 2017-02-22 NOTE — PROGRESS NOTES
Attempted to reach patient. 2nd Message left for call back concerning culture result and need for treatment.

## 2017-02-23 PROCEDURE — 3331090001 HH PPS REVENUE CREDIT

## 2017-02-23 PROCEDURE — 3331090002 HH PPS REVENUE DEBIT

## 2017-02-24 ENCOUNTER — HOME CARE VISIT (OUTPATIENT)
Dept: SCHEDULING | Facility: HOME HEALTH | Age: 82
End: 2017-02-24
Payer: MEDICARE

## 2017-02-24 PROCEDURE — 3331090001 HH PPS REVENUE CREDIT

## 2017-02-24 PROCEDURE — 3331090002 HH PPS REVENUE DEBIT

## 2017-02-25 ENCOUNTER — HOSPITAL ENCOUNTER (EMERGENCY)
Age: 82
Discharge: HOME | End: 2017-02-25
Attending: EMERGENCY MEDICINE
Payer: MEDICARE

## 2017-02-25 ENCOUNTER — APPOINTMENT (OUTPATIENT)
Dept: GENERAL RADIOLOGY | Age: 82
End: 2017-02-25
Attending: PHYSICIAN ASSISTANT
Payer: MEDICARE

## 2017-02-25 VITALS
HEIGHT: 60 IN | OXYGEN SATURATION: 100 % | SYSTOLIC BLOOD PRESSURE: 150 MMHG | WEIGHT: 88 LBS | HEART RATE: 74 BPM | TEMPERATURE: 98 F | RESPIRATION RATE: 17 BRPM | DIASTOLIC BLOOD PRESSURE: 81 MMHG | BODY MASS INDEX: 17.28 KG/M2

## 2017-02-25 DIAGNOSIS — M79.652 LEFT THIGH PAIN: Primary | ICD-10-CM

## 2017-02-25 PROCEDURE — 74011250637 HC RX REV CODE- 250/637: Performed by: PHYSICIAN ASSISTANT

## 2017-02-25 PROCEDURE — 99284 EMERGENCY DEPT VISIT MOD MDM: CPT

## 2017-02-25 PROCEDURE — 3331090002 HH PPS REVENUE DEBIT

## 2017-02-25 PROCEDURE — 73502 X-RAY EXAM HIP UNI 2-3 VIEWS: CPT

## 2017-02-25 PROCEDURE — 3331090001 HH PPS REVENUE CREDIT

## 2017-02-25 PROCEDURE — 93971 EXTREMITY STUDY: CPT

## 2017-02-25 RX ORDER — ACETAMINOPHEN 325 MG/1
650 TABLET ORAL
Status: COMPLETED | OUTPATIENT
Start: 2017-02-25 | End: 2017-02-25

## 2017-02-25 RX ADMIN — ACETAMINOPHEN 650 MG: 325 TABLET, FILM COATED ORAL at 17:52

## 2017-02-25 NOTE — ED PROVIDER NOTES
HPI Comments: 80year old female presenting for LEFT leg pain. Pt relatively poor historian due to age, most hx obtained from son at beside. Son reports that pt woke this morning complaining of pain to her left proximal anterior thigh, severe, worsened with use of the leg and palpation. Son denies any known trauma or injury. Denies fever, CP, SOB, abdominal pain, N/V/D, hx DVT. No medications attempted PTA. No other concerns. PMHx: HTN, PNA, vertigo, GERD, ICH, VRE, indwelling Chambers    Patient is a 80 y.o. female presenting with leg pain. The history is provided by the patient and a relative. Leg Pain    Pertinent negatives include no numbness. Past Medical History:   Diagnosis Date    Gastrointestinal disorder     GERD    Hypercholesterolemia     Hypertension     Intracerebral bleed (HCC)     Pneumonia, organism unspecified 5/10/2013    Vertigo     VRE (vancomycin resistant enterococcus) culture positive        Past Surgical History:   Procedure Laterality Date    NEUROLOGICAL PROCEDURE UNLISTED      brain surgery         History reviewed. No pertinent family history. Social History     Social History    Marital status: SINGLE     Spouse name: N/A    Number of children: N/A    Years of education: N/A     Occupational History    Not on file. Social History Main Topics    Smoking status: Never Smoker    Smokeless tobacco: Not on file    Alcohol use No    Drug use: No    Sexual activity: Not on file     Other Topics Concern    Not on file     Social History Narrative         ALLERGIES: Aspirin    Review of Systems   Unable to perform ROS: Age   Constitutional: Negative for fever. HENT: Negative for congestion. Respiratory: Negative for cough. Cardiovascular: Negative for chest pain. Gastrointestinal: Negative for abdominal pain, diarrhea and vomiting. Musculoskeletal: Positive for myalgias. Skin: Negative for rash. Neurological: Negative for numbness. Vitals:    02/25/17 1502   BP: 159/86   Pulse: 73   Resp: 16   Temp: 98.5 °F (36.9 °C)   SpO2: 93%   Weight: 39.9 kg (88 lb)   Height: 5' (1.524 m)            Physical Exam   Constitutional: She is oriented to person, place, and time. She appears well-developed and well-nourished. No distress. Elderly, chronically ill-appearing  female   HENT:   Head: Normocephalic and atraumatic. Right Ear: External ear normal.   Left Ear: External ear normal.   Eyes: Conjunctivae are normal. No scleral icterus. Neck: Neck supple. No tracheal deviation present. Cardiovascular: Normal rate, regular rhythm and normal heart sounds. Exam reveals no gallop and no friction rub. No murmur heard. Pulmonary/Chest: Effort normal and breath sounds normal. No stridor. No respiratory distress. She has no wheezes. Abdominal: Soft. She exhibits no distension. There is no tenderness. There is no rebound and no guarding. Genitourinary:   Genitourinary Comments: Indwelling Chambers   Musculoskeletal: Normal range of motion. Legs:  No regional nodes. No palpable hernia. Distal pulses intact, 2+ DP and PT pulses, foot warm and well-perfused, brisk cap refill, 5/5 dorsi and plantar flexion  Some increased pain with passive flexion and rotation of the hip     Neurological: She is alert and oriented to person, place, and time. Skin: Skin is warm and dry. Psychiatric: She has a normal mood and affect. Her behavior is normal.   Nursing note and vitals reviewed. MDM  Number of Diagnoses or Management Options  Left thigh pain:   Diagnosis management comments: 80year old female presenting for one day history of left anterior thigh pain. Atraumatic. No overlying skin changes, good distal perfusion, no TTP of the pelvis. Normal hip XR and duplex. Discussed with family possible MSK pain, encouraged Tylenol PRN, return precautions discussed.          Amount and/or Complexity of Data Reviewed  Tests in the radiology section of CPT®: ordered and reviewed  Discuss the patient with other providers: yes (Dr. Danni Leija, ED attending)      ED Course       Procedures

## 2017-02-25 NOTE — ED TRIAGE NOTES
Pt presents with pain to left upper leg. Upon palpation pt states it is very painful. No warmth to touch or swelling noted.

## 2017-02-25 NOTE — PROCEDURES
[address]  *** FINAL REPORT ***    Name: Rafaela Carbajal  MRN: [id number]  : 1934  HIS Order #: [id number]  [address] [facility] Visit #: [id number]  Date: 2017    TYPE OF TEST: Peripheral Venous Testing    REASON FOR TEST  Pain in limb    Left Leg:-  Deep venous thrombosis:           No  Superficial venous thrombosis:    No  Deep venous insufficiency:        Not examined  Superficial venous insufficiency: Not examined      INTERPRETATION/FINDINGS  PROCEDURE:  Color duplex ultrasound imaging of lower extremity veins. FINDINGS:       Right: The common femoral vein is patent and without evidence of  thrombus. Phasic flow is observed. This extremity was not otherwise  evaluated. Left:   The common femoral, deep femoral, femoral, popliteal,  posterior tibial, peroneal, and great saphenous are patent and without   evidence of thrombus where visualized;  each is compressible and  there is no narrowing of the flow channel on color Doppler imaging. Phasic flow is observed in the common femoral vein. IMPRESSION:  No evidence of left lower extremity vein thrombosis. ADDITIONAL COMMENTS    I have personally reviewed the data relevant to the interpretation of  this  study.     TECHNOLOGIST: Carol Cantu RVT  Signed: 2017 05:32 PM    PHYSICIAN: João Bowling MD  Signed: 2017 09:11 AM

## 2017-02-25 NOTE — ED NOTES
Discharge instructions given to patient and son by MD and nurse. Pt has been given counseling on medication use and verbalizes understanding. Transport set up with [facility]. REGINALD 1945      7:45 PM  [facility] here to take pt home, pt and son aware of plan of care and has paperwork and is being transported off of unit in no signs of distress. 600mL urine emptied before discharge.

## 2017-02-25 NOTE — DISCHARGE INSTRUCTIONS
Leg Pain: Care Instructions  Your Care Instructions  Many things can cause leg pain. Too much exercise or overuse can cause a muscle cramp (or charley horse). You can get leg cramps from not eating a balanced diet that has enough potassium, calcium, and other minerals. If you do not drink enough fluids or are taking certain medicines, you may develop leg cramps. Other causes of leg pain include injuries, blood flow problems, nerve damage, and twisted and enlarged veins (varicose veins). You can usually ease pain with self-care. Your doctor may recommend that you rest your leg and keep it elevated. Follow-up care is a key part of your treatment and safety. Be sure to make and go to all appointments, and call your doctor if you are having problems. Its also a good idea to know your test results and keep a list of the medicines you take. How can you care for yourself at home? · Take pain medicines exactly as directed. ¨ If the doctor gave you a prescription medicine for pain, take it as prescribed. ¨ If you are not taking a prescription pain medicine, ask your doctor if you can take an over-the-counter medicine. · Take any other medicines exactly as prescribed. Call your doctor if you think you are having a problem with your medicine. · Rest your leg while you have pain, and avoid standing for long periods of time. · Prop up your leg at or above the level of your heart when possible. · Make sure you are eating a balanced diet that is rich in calcium, potassium, and magnesium, especially if you are pregnant. · If directed by your doctor, put ice or a cold pack on the area for 10 to 20 minutes at a time. Put a thin cloth between the ice and your skin. · Your leg may be in a splint, a brace, or an elastic bandage, and you may have crutches to help you walk. Follow your doctor's directions about how long to wear supports and how to use the crutches. When should you call for help?   Call 911 anytime you think you may need emergency care. For example, call if:  · You have sudden chest pain and shortness of breath, or you cough up blood. · Your leg is cool or pale or changes color. Call your doctor now or seek immediate medical care if:  · You have increasing or severe pain. · Your leg suddenly feels weak and you cannot move it. · You have signs of a blood clot, such as:  ¨ Pain in your calf, back of the knee, thigh, or groin. ¨ Redness and swelling in your leg or groin. · You have signs of infection, such as:  ¨ Increased pain, swelling, warmth, or redness. ¨ Red streaks leading from the sore area. ¨ Pus draining from a place on your leg. ¨ A fever. · You cannot bear weight on your leg. Watch closely for changes in your health, and be sure to contact your doctor if:  · You do not get better as expected. Where can you learn more? Go to http://jose-cheyanne.info/. Enter R865 in the search box to learn more about \"Leg Pain: Care Instructions. \"  Current as of: May 27, 2016  Content Version: 11.1  © 5767-8820 [employer]. Care instructions adapted under license by [employer] (which disclaims liability or warranty for this information). If you have questions about a medical condition or this instruction, always ask your healthcare professional. [address] any warranty or liability for your use of this information.

## 2017-02-26 PROCEDURE — 3331090001 HH PPS REVENUE CREDIT

## 2017-02-26 PROCEDURE — 3331090002 HH PPS REVENUE DEBIT

## 2017-02-27 PROCEDURE — 3331090002 HH PPS REVENUE DEBIT

## 2017-02-27 PROCEDURE — 3331090001 HH PPS REVENUE CREDIT

## 2017-02-28 ENCOUNTER — HOME CARE VISIT (OUTPATIENT)
Dept: HOME HEALTH SERVICES | Facility: HOME HEALTH | Age: 82
End: 2017-02-28
Payer: MEDICARE

## 2017-02-28 PROCEDURE — 3331090001 HH PPS REVENUE CREDIT

## 2017-02-28 PROCEDURE — 3331090002 HH PPS REVENUE DEBIT

## 2017-03-01 PROCEDURE — 3331090001 HH PPS REVENUE CREDIT

## 2017-03-01 PROCEDURE — 3331090002 HH PPS REVENUE DEBIT

## 2017-03-02 PROCEDURE — 3331090001 HH PPS REVENUE CREDIT

## 2017-03-02 PROCEDURE — 3331090002 HH PPS REVENUE DEBIT

## 2017-03-03 PROCEDURE — 3331090001 HH PPS REVENUE CREDIT

## 2017-03-03 PROCEDURE — 3331090002 HH PPS REVENUE DEBIT

## 2017-03-04 ENCOUNTER — HOME CARE VISIT (OUTPATIENT)
Dept: HOME HEALTH SERVICES | Facility: HOME HEALTH | Age: 82
End: 2017-03-04
Payer: MEDICARE

## 2017-03-04 PROCEDURE — 3331090001 HH PPS REVENUE CREDIT

## 2017-03-04 PROCEDURE — 3331090002 HH PPS REVENUE DEBIT

## 2017-03-05 PROCEDURE — 3331090002 HH PPS REVENUE DEBIT

## 2017-03-05 PROCEDURE — 3331090001 HH PPS REVENUE CREDIT

## 2017-03-06 PROCEDURE — 3331090001 HH PPS REVENUE CREDIT

## 2017-03-06 PROCEDURE — 3331090002 HH PPS REVENUE DEBIT

## 2017-03-07 ENCOUNTER — HOSPITAL ENCOUNTER (EMERGENCY)
Age: 82
Discharge: HOME | End: 2017-03-07
Attending: EMERGENCY MEDICINE
Payer: MEDICARE

## 2017-03-07 VITALS
WEIGHT: 88 LBS | RESPIRATION RATE: 18 BRPM | DIASTOLIC BLOOD PRESSURE: 69 MMHG | SYSTOLIC BLOOD PRESSURE: 149 MMHG | HEART RATE: 82 BPM | HEIGHT: 60 IN | OXYGEN SATURATION: 100 % | BODY MASS INDEX: 17.28 KG/M2 | TEMPERATURE: 98.6 F

## 2017-03-07 DIAGNOSIS — N30.00 ACUTE CYSTITIS WITHOUT HEMATURIA: Primary | ICD-10-CM

## 2017-03-07 LAB
ALBUMIN SERPL BCP-MCNC: 3.2 G/DL (ref 3.5–5)
ALBUMIN/GLOB SERPL: 0.7 {RATIO} (ref 1.1–2.2)
ALP SERPL-CCNC: 142 U/L (ref 45–117)
ALT SERPL-CCNC: 31 U/L (ref 12–78)
AMORPH CRY URNS QL MICRO: ABNORMAL
ANION GAP BLD CALC-SCNC: 7 MMOL/L (ref 5–15)
APPEARANCE UR: ABNORMAL
AST SERPL W P-5'-P-CCNC: 82 U/L (ref 15–37)
BACTERIA URNS QL MICRO: ABNORMAL /HPF
BASOPHILS # BLD AUTO: 0 K/UL (ref 0–0.1)
BASOPHILS # BLD: 0 % (ref 0–1)
BILIRUB SERPL-MCNC: 0.7 MG/DL (ref 0.2–1)
BILIRUB UR QL: NEGATIVE
BUN SERPL-MCNC: 13 MG/DL (ref 6–20)
BUN/CREAT SERPL: 16 (ref 12–20)
CALCIUM SERPL-MCNC: 8.7 MG/DL (ref 8.5–10.1)
CHLORIDE SERPL-SCNC: 101 MMOL/L (ref 97–108)
CO2 SERPL-SCNC: 30 MMOL/L (ref 21–32)
COLOR UR: ABNORMAL
CREAT SERPL-MCNC: 0.81 MG/DL (ref 0.55–1.02)
EOSINOPHIL # BLD: 0.5 K/UL (ref 0–0.4)
EOSINOPHIL NFR BLD: 5 % (ref 0–7)
EPITH CASTS URNS QL MICRO: ABNORMAL /LPF
ERYTHROCYTE [DISTWIDTH] IN BLOOD BY AUTOMATED COUNT: 14.8 % (ref 11.5–14.5)
GLOBULIN SER CALC-MCNC: 4.3 G/DL (ref 2–4)
GLUCOSE SERPL-MCNC: 120 MG/DL (ref 65–100)
GLUCOSE UR STRIP.AUTO-MCNC: NEGATIVE MG/DL
HCT VFR BLD AUTO: 35.1 % (ref 35–47)
HGB BLD-MCNC: 11.4 G/DL (ref 11.5–16)
HGB UR QL STRIP: ABNORMAL
KETONES UR QL STRIP.AUTO: NEGATIVE MG/DL
LEUKOCYTE ESTERASE UR QL STRIP.AUTO: ABNORMAL
LYMPHOCYTES # BLD AUTO: 13 % (ref 12–49)
LYMPHOCYTES # BLD: 1.2 K/UL (ref 0.8–3.5)
MCH RBC QN AUTO: 24.9 PG (ref 26–34)
MCHC RBC AUTO-ENTMCNC: 32.5 G/DL (ref 30–36.5)
MCV RBC AUTO: 76.6 FL (ref 80–99)
MONOCYTES # BLD: 0.7 K/UL (ref 0–1)
MONOCYTES NFR BLD AUTO: 7 % (ref 5–13)
NEUTS SEG # BLD: 6.7 K/UL (ref 1.8–8)
NEUTS SEG NFR BLD AUTO: 75 % (ref 32–75)
NITRITE UR QL STRIP.AUTO: NEGATIVE
PH UR STRIP: 7 [PH] (ref 5–8)
PLATELET # BLD AUTO: 173 K/UL (ref 150–400)
POTASSIUM SERPL-SCNC: 5.1 MMOL/L (ref 3.5–5.1)
PROT SERPL-MCNC: 7.5 G/DL (ref 6.4–8.2)
PROT UR STRIP-MCNC: 30 MG/DL
RBC # BLD AUTO: 4.58 M/UL (ref 3.8–5.2)
RBC #/AREA URNS HPF: ABNORMAL /HPF (ref 0–5)
SODIUM SERPL-SCNC: 138 MMOL/L (ref 136–145)
SP GR UR REFRACTOMETRY: 1.02 (ref 1–1.03)
UROBILINOGEN UR QL STRIP.AUTO: 1 EU/DL (ref 0.2–1)
WBC # BLD AUTO: 9.1 K/UL (ref 3.6–11)
WBC URNS QL MICRO: ABNORMAL /HPF (ref 0–4)
YEAST URNS QL MICRO: PRESENT

## 2017-03-07 PROCEDURE — 99285 EMERGENCY DEPT VISIT HI MDM: CPT

## 2017-03-07 PROCEDURE — 36415 COLL VENOUS BLD VENIPUNCTURE: CPT | Performed by: EMERGENCY MEDICINE

## 2017-03-07 PROCEDURE — 3331090001 HH PPS REVENUE CREDIT

## 2017-03-07 PROCEDURE — 96361 HYDRATE IV INFUSION ADD-ON: CPT

## 2017-03-07 PROCEDURE — 3331090002 HH PPS REVENUE DEBIT

## 2017-03-07 PROCEDURE — 80053 COMPREHEN METABOLIC PANEL: CPT | Performed by: EMERGENCY MEDICINE

## 2017-03-07 PROCEDURE — 85025 COMPLETE CBC W/AUTO DIFF WBC: CPT | Performed by: EMERGENCY MEDICINE

## 2017-03-07 PROCEDURE — 74011250636 HC RX REV CODE- 250/636: Performed by: EMERGENCY MEDICINE

## 2017-03-07 PROCEDURE — 81001 URINALYSIS AUTO W/SCOPE: CPT | Performed by: EMERGENCY MEDICINE

## 2017-03-07 PROCEDURE — 96360 HYDRATION IV INFUSION INIT: CPT

## 2017-03-07 RX ORDER — NITROFURANTOIN 25; 75 MG/1; MG/1
100 CAPSULE ORAL 2 TIMES DAILY
Qty: 10 CAP | Refills: 0 | Status: SHIPPED | OUTPATIENT
Start: 2017-03-07 | End: 2017-03-12

## 2017-03-07 RX ADMIN — SODIUM CHLORIDE 500 ML: 900 INJECTION, SOLUTION INTRAVENOUS at 18:08

## 2017-03-07 NOTE — ED PROVIDER NOTES
HPI Comments: 80 y.o. female with past medical history significant for HTN, PNA, vertigo, GERD, hypercholesterolemia, intracerebral bleed, VRE culture positive who presents from home via EMS with chief complaint of pubic pain. Pt has had an indwelling gramajo catheter for ~ the last year. Family reports that the catheter was placed here, but he and the pt are unsure as to why. Since this morning, pt reports pubic and suprapubic pain in the area of the inserted catheter. No other abdominal pain. Family notes that the pt is bind in her right eye. She denies having any other pain or complaint, specifically denying the following: N/V/D, constipation, fever, and chills. There are no other acute medical concerns at this time. Social hx: Never smoker. No alcohol use. PCP: Armando Johnson MD    Note written by Cody Whitfield, as dictated by Cindy Hernandez MD 4:50 PM     The history is provided by the patient and a relative. A  was used (Family member). Past Medical History:   Diagnosis Date    Gastrointestinal disorder     GERD    Hypercholesterolemia     Hypertension     Intracerebral bleed (HCC)     Pneumonia, organism unspecified 5/10/2013    Vertigo     VRE (vancomycin resistant enterococcus) culture positive        Past Surgical History:   Procedure Laterality Date    NEUROLOGICAL PROCEDURE UNLISTED      brain surgery         History reviewed. No pertinent family history. Social History     Social History    Marital status: SINGLE     Spouse name: N/A    Number of children: N/A    Years of education: N/A     Occupational History    Not on file.      Social History Main Topics    Smoking status: Never Smoker    Smokeless tobacco: Not on file    Alcohol use No    Drug use: No    Sexual activity: Not on file     Other Topics Concern    Not on file     Social History Narrative         ALLERGIES: Aspirin    Review of Systems   Constitutional: Negative for chills and fever. HENT: Negative for ear pain and sore throat. Eyes: Negative for pain. Respiratory: Negative for chest tightness and shortness of breath. Cardiovascular: Negative for chest pain and leg swelling. Gastrointestinal: Negative for nausea and vomiting. Genitourinary: Positive for pelvic pain. Negative for dysuria and flank pain. Musculoskeletal: Negative for back pain. Skin: Negative for rash. Neurological: Negative for headaches. All other systems reviewed and are negative. Vitals:    03/07/17 1216   BP: 153/87   Pulse: 82   Resp: 18   Temp: 98.6 °F (37 °C)   SpO2: 99%   Weight: 39.9 kg (88 lb)   Height: 5' (1.524 m)            Physical Exam   Constitutional: She appears well-developed and well-nourished. Appears elderly and frail. HENT:   Head: Normocephalic and atraumatic. Mouth/Throat: Oropharynx is clear and moist.   Eyes: Conjunctivae and EOM are normal. No scleral icterus. Left pupil is not reactive. Chronic left eye deformity. Neck: Neck supple. No tracheal deviation present. Cardiovascular: Normal rate, regular rhythm and normal heart sounds. Pulmonary/Chest: Effort normal and breath sounds normal. No respiratory distress. Abdominal: Soft. She exhibits no distension. There is no tenderness. There is no rebound and no guarding. Abdomen soft and non tender. Genitourinary:   Genitourinary Comments: Pain with manipulation of gramajo catheter. Musculoskeletal: She exhibits no edema. Neurological: She is alert. Skin: Skin is warm and dry. Psychiatric: She has a normal mood and affect. Nursing note and vitals reviewed. Note written by Cody Bagley, as dictated by Herminio Lea. Keith Hayes MD 4:43 PM      [facility]  ED Course       Procedures    The patient presents with pain at gramajo catheter site. History of chronic UTI. Unclear of why she needs gramajo.         LABORATORY TESTS:  Recent Results (from the past 24 hour(s))   CBC WITH AUTOMATED DIFF    Collection Time: 03/07/17  2:02 PM   Result Value Ref Range    WBC 9.1 3.6 - 11.0 K/uL    RBC 4.58 3.80 - 5.20 M/uL    HGB 11.4 (L) 11.5 - 16.0 g/dL    HCT 35.1 35.0 - 47.0 %    MCV 76.6 (L) 80.0 - 99.0 FL    MCH 24.9 (L) 26.0 - 34.0 PG    MCHC 32.5 30.0 - 36.5 g/dL    RDW 14.8 (H) 11.5 - 14.5 %    PLATELET 768 121 - 952 K/uL    NEUTROPHILS 75 32 - 75 %    LYMPHOCYTES 13 12 - 49 %    MONOCYTES 7 5 - 13 %    EOSINOPHILS 5 0 - 7 %    BASOPHILS 0 0 - 1 %    ABS. NEUTROPHILS 6.7 1.8 - 8.0 K/UL    ABS. LYMPHOCYTES 1.2 0.8 - 3.5 K/UL    ABS. MONOCYTES 0.7 0.0 - 1.0 K/UL    ABS. EOSINOPHILS 0.5 (H) 0.0 - 0.4 K/UL    ABS. BASOPHILS 0.0 0.0 - 0.1 K/UL   METABOLIC PANEL, COMPREHENSIVE    Collection Time: 03/07/17  2:02 PM   Result Value Ref Range    Sodium 138 136 - 145 mmol/L    Potassium 5.1 3.5 - 5.1 mmol/L    Chloride 101 97 - 108 mmol/L    CO2 30 21 - 32 mmol/L    Anion gap 7 5 - 15 mmol/L    Glucose 120 (H) 65 - 100 mg/dL    BUN 13 6 - 20 MG/DL    Creatinine 0.81 0.55 - 1.02 MG/DL    BUN/Creatinine ratio 16 12 - 20      GFR est AA >60 >60 ml/min/1.73m2    GFR est non-AA >60 >60 ml/min/1.73m2    Calcium 8.7 8.5 - 10.1 MG/DL    Bilirubin, total 0.7 0.2 - 1.0 MG/DL    ALT (SGPT) 31 12 - 78 U/L    AST (SGOT) 82 (H) 15 - 37 U/L    Alk.  phosphatase 142 (H) 45 - 117 U/L    Protein, total 7.5 6.4 - 8.2 g/dL    Albumin 3.2 (L) 3.5 - 5.0 g/dL    Globulin 4.3 (H) 2.0 - 4.0 g/dL    A-G Ratio 0.7 (L) 1.1 - 2.2     URINALYSIS W/MICROSCOPIC    Collection Time: 03/07/17  6:56 PM   Result Value Ref Range    Color YELLOW/STRAW      Appearance CLOUDY (A) CLEAR      Specific gravity 1.017 1.003 - 1.030      pH (UA) 7.0 5.0 - 8.0      Protein 30 (A) NEG mg/dL    Glucose NEGATIVE  NEG mg/dL    Ketone NEGATIVE  NEG mg/dL    Bilirubin NEGATIVE  NEG      Blood MODERATE (A) NEG      Urobilinogen 1.0 0.2 - 1.0 EU/dL    Nitrites NEGATIVE  NEG      Leukocyte Esterase MODERATE (A) NEG      WBC 20-50 0 - 4 /hpf    RBC 10-20 0 - 5 /hpf    Epithelial cells MODERATE (A) FEW /lpf    Bacteria 1+ (A) NEG /hpf    Amorphous Crystals FEW (A) NEG      Yeast PRESENT (A) NEG         IMAGING RESULTS:  No results found. MEDICATIONS GIVEN:  Medications   sodium chloride 0.9 % bolus infusion 500 mL (0 mL IntraVENous IV Completed 3/7/17 2002)       IMPRESSION:  1.  Acute cystitis without hematuria        PLAN:  -  [facility], Urology f/u    Disposition:  home    Condition: stable    Total critical care time spend exclusive of procedures:  0    Diego Manzo MD

## 2017-03-08 ENCOUNTER — HOME CARE VISIT (OUTPATIENT)
Dept: SCHEDULING | Facility: HOME HEALTH | Age: 82
End: 2017-03-08
Payer: MEDICARE

## 2017-03-08 PROCEDURE — 3331090002 HH PPS REVENUE DEBIT

## 2017-03-08 PROCEDURE — 3331090001 HH PPS REVENUE CREDIT

## 2017-03-08 NOTE — ED NOTES
Upon tech entering room states she found a bed bug. Patient deconed. Son instructed to go home due to bed bug contamination. Son refusing to leave. HPTERI at bedside to escort son out. Toney Joshi at bedside to transport patient home. No acute distress noted.

## 2017-03-08 NOTE — DISCHARGE INSTRUCTIONS
Urinary Tract Infection in Women: Care Instructions  Your Care Instructions    A urinary tract infection, or UTI, is a general term for an infection anywhere between the kidneys and the urethra (where urine comes out). Most UTIs are bladder infections. They often cause pain or burning when you urinate. UTIs are caused by bacteria and can be cured with antibiotics. Be sure to complete your treatment so that the infection goes away. Follow-up care is a key part of your treatment and safety. Be sure to make and go to all appointments, and call your doctor if you are having problems. It's also a good idea to know your test results and keep a list of the medicines you take. How can you care for yourself at home? · Take your antibiotics as directed. Do not stop taking them just because you feel better. You need to take the full course of antibiotics. · Drink extra water and other fluids for the next day or two. This may help wash out the bacteria that are causing the infection. (If you have kidney, heart, or liver disease and have to limit fluids, talk with your doctor before you increase your fluid intake.)  · Avoid drinks that are carbonated or have caffeine. They can irritate the bladder. · Urinate often. Try to empty your bladder each time. · To relieve pain, take a hot bath or lay a heating pad set on low over your lower belly or genital area. Never go to sleep with a heating pad in place. To prevent UTIs  · Drink plenty of water each day. This helps you urinate often, which clears bacteria from your system. (If you have kidney, heart, or liver disease and have to limit fluids, talk with your doctor before you increase your fluid intake.)  · Consider adding cranberry juice to your diet. · Urinate when you need to. · Urinate right after you have sex. · Change sanitary pads often. · Avoid douches, bubble baths, feminine hygiene sprays, and other feminine hygiene products that have deodorants.   · After going to the bathroom, wipe from front to back. When should you call for help? Call your doctor now or seek immediate medical care if:  · Symptoms such as fever, chills, nausea, or vomiting get worse or appear for the first time. · You have new pain in your back just below your rib cage. This is called flank pain. · There is new blood or pus in your urine. · You have any problems with your antibiotic medicine. Watch closely for changes in your health, and be sure to contact your doctor if:  · You are not getting better after taking an antibiotic for 2 days. · Your symptoms go away but then come back. Where can you learn more? Go to http://jose-cheyanne.info/. Enter N891 in the search box to learn more about \"Urinary Tract Infection in Women: Care Instructions. \"  Current as of: August 12, 2016  Content Version: 11.1  © 4555-6954 [employer]. Care instructions adapted under license by [employer] (which disclaims liability or warranty for this information). If you have questions about a medical condition or this instruction, always ask your healthcare professional. [place] [address] any warranty or liability for your use of this information. We hope that we have addressed all of your medical concerns. The examination and treatment you received in the Emergency Department were for an emergent problem and were not intended as complete care. It is important that you follow up with your healthcare provider(s) for ongoing care. If your symptoms worsen or do not improve as expected, and you are unable to reach your usual health care provider(s), you should return to the Emergency Department. Today's healthcare is undergoing tremendous change, and patient satisfaction surveys are one of the many tools to assess the quality of medical care.   You may receive a survey from the [employer] regarding your experience in the Emergency Department. I hope that your experience has been completely positive, particularly the medical care that I provided. As such, please participate in the survey; anything less than excellent does not meet my expectations or intentions. [facility] and [facility] participate in nationally recognized quality of care measures. If your blood pressure is greater than 120/80, as reported below, we urge that you seek medical care to address the potential of high blood pressure, commonly known as hypertension. Hypertension can be hereditary or can be caused by certain medical conditions, pain, stress, or \"white coat syndrome. \"       Please make an appointment with your health care provider(s) for follow up of your Emergency Department visit. VITALS:   Patient Vitals for the past 8 hrs:   Temp Pulse Resp BP SpO2   03/07/17 1900 - - - 110/79 99 %   03/07/17 1845 - - - 133/70 100 %   03/07/17 1830 - - - 139/76 91 %   03/07/17 1815 - - - 132/64 93 %   03/07/17 1216 98.6 °F (37 °C) 82 18 153/87 99 %          Thank you for allowing us to provide you with medical care today. We realize that you have many choices for your emergency care needs. Please choose us in the future for any continued health care needs. Khang Huynh [address].   Office: [phone]            Recent Results (from the past 24 hour(s))   CBC WITH AUTOMATED DIFF    Collection Time: 03/07/17  2:02 PM   Result Value Ref Range    WBC 9.1 3.6 - 11.0 K/uL    RBC 4.58 3.80 - 5.20 M/uL    HGB 11.4 (L) 11.5 - 16.0 g/dL    HCT 35.1 35.0 - 47.0 %    MCV 76.6 (L) 80.0 - 99.0 FL    MCH 24.9 (L) 26.0 - 34.0 PG    MCHC 32.5 30.0 - 36.5 g/dL    RDW 14.8 (H) 11.5 - 14.5 %    PLATELET 166 554 - 136 K/uL    NEUTROPHILS 75 32 - 75 %    LYMPHOCYTES 13 12 - 49 %    MONOCYTES 7 5 - 13 %    EOSINOPHILS 5 0 - 7 %    BASOPHILS 0 0 - 1 %    ABS.  NEUTROPHILS 6.7 1.8 - 8.0 K/UL    ABS. LYMPHOCYTES 1.2 0.8 - 3.5 K/UL    ABS. MONOCYTES 0.7 0.0 - 1.0 K/UL    ABS. EOSINOPHILS 0.5 (H) 0.0 - 0.4 K/UL    ABS. BASOPHILS 0.0 0.0 - 0.1 K/UL   METABOLIC PANEL, COMPREHENSIVE    Collection Time: 03/07/17  2:02 PM   Result Value Ref Range    Sodium 138 136 - 145 mmol/L    Potassium 5.1 3.5 - 5.1 mmol/L    Chloride 101 97 - 108 mmol/L    CO2 30 21 - 32 mmol/L    Anion gap 7 5 - 15 mmol/L    Glucose 120 (H) 65 - 100 mg/dL    BUN 13 6 - 20 MG/DL    Creatinine 0.81 0.55 - 1.02 MG/DL    BUN/Creatinine ratio 16 12 - 20      GFR est AA >60 >60 ml/min/1.73m2    GFR est non-AA >60 >60 ml/min/1.73m2    Calcium 8.7 8.5 - 10.1 MG/DL    Bilirubin, total 0.7 0.2 - 1.0 MG/DL    ALT (SGPT) 31 12 - 78 U/L    AST (SGOT) 82 (H) 15 - 37 U/L    Alk. phosphatase 142 (H) 45 - 117 U/L    Protein, total 7.5 6.4 - 8.2 g/dL    Albumin 3.2 (L) 3.5 - 5.0 g/dL    Globulin 4.3 (H) 2.0 - 4.0 g/dL    A-G Ratio 0.7 (L) 1.1 - 2.2     URINALYSIS W/MICROSCOPIC    Collection Time: 03/07/17  6:56 PM   Result Value Ref Range    Color YELLOW/STRAW      Appearance CLOUDY (A) CLEAR      Specific gravity 1.017 1.003 - 1.030      pH (UA) 7.0 5.0 - 8.0      Protein 30 (A) NEG mg/dL    Glucose NEGATIVE  NEG mg/dL    Ketone NEGATIVE  NEG mg/dL    Bilirubin NEGATIVE  NEG      Blood MODERATE (A) NEG      Urobilinogen 1.0 0.2 - 1.0 EU/dL    Nitrites NEGATIVE  NEG      Leukocyte Esterase MODERATE (A) NEG      WBC 20-50 0 - 4 /hpf    RBC 10-20 0 - 5 /hpf    Epithelial cells MODERATE (A) FEW /lpf    Bacteria 1+ (A) NEG /hpf    Amorphous Crystals FEW (A) NEG      Yeast PRESENT (A) NEG         No results found.

## 2017-03-09 PROCEDURE — 3331090001 HH PPS REVENUE CREDIT

## 2017-03-09 PROCEDURE — 3331090002 HH PPS REVENUE DEBIT

## 2017-03-10 ENCOUNTER — HOME CARE VISIT (OUTPATIENT)
Dept: HOME HEALTH SERVICES | Facility: HOME HEALTH | Age: 82
End: 2017-03-10
Payer: MEDICARE

## 2017-03-10 PROCEDURE — 3331090001 HH PPS REVENUE CREDIT

## 2017-03-10 PROCEDURE — 3331090002 HH PPS REVENUE DEBIT

## 2017-03-11 PROCEDURE — 3331090001 HH PPS REVENUE CREDIT

## 2017-03-11 PROCEDURE — 3331090002 HH PPS REVENUE DEBIT

## 2017-03-12 PROCEDURE — 3331090001 HH PPS REVENUE CREDIT

## 2017-03-12 PROCEDURE — 3331090002 HH PPS REVENUE DEBIT

## 2017-03-13 PROCEDURE — 3331090002 HH PPS REVENUE DEBIT

## 2017-03-13 PROCEDURE — 3331090001 HH PPS REVENUE CREDIT

## 2017-03-14 PROCEDURE — 3331090001 HH PPS REVENUE CREDIT

## 2017-03-14 PROCEDURE — 3331090002 HH PPS REVENUE DEBIT

## 2017-03-15 ENCOUNTER — HOME CARE VISIT (OUTPATIENT)
Dept: HOME HEALTH SERVICES | Facility: HOME HEALTH | Age: 82
End: 2017-03-15
Payer: MEDICARE

## 2017-03-15 PROCEDURE — 3331090002 HH PPS REVENUE DEBIT

## 2017-03-15 PROCEDURE — 3331090001 HH PPS REVENUE CREDIT

## 2017-03-16 PROCEDURE — 3331090002 HH PPS REVENUE DEBIT

## 2017-03-16 PROCEDURE — 3331090001 HH PPS REVENUE CREDIT

## 2017-03-17 PROCEDURE — 3331090001 HH PPS REVENUE CREDIT

## 2017-03-17 PROCEDURE — 3331090002 HH PPS REVENUE DEBIT

## 2017-03-18 PROCEDURE — 3331090001 HH PPS REVENUE CREDIT

## 2017-03-18 PROCEDURE — 3331090002 HH PPS REVENUE DEBIT

## 2017-03-19 PROCEDURE — 3331090002 HH PPS REVENUE DEBIT

## 2017-03-19 PROCEDURE — 3331090001 HH PPS REVENUE CREDIT

## 2017-03-20 PROCEDURE — 3331090001 HH PPS REVENUE CREDIT

## 2017-03-20 PROCEDURE — 3331090002 HH PPS REVENUE DEBIT

## 2017-03-21 ENCOUNTER — HOSPITAL ENCOUNTER (EMERGENCY)
Age: 82
Discharge: HOME | End: 2017-03-21
Attending: EMERGENCY MEDICINE
Payer: MEDICARE

## 2017-03-21 VITALS
TEMPERATURE: 98.6 F | BODY MASS INDEX: 17.28 KG/M2 | WEIGHT: 88 LBS | HEIGHT: 60 IN | OXYGEN SATURATION: 93 % | HEART RATE: 79 BPM | RESPIRATION RATE: 16 BRPM | DIASTOLIC BLOOD PRESSURE: 89 MMHG | SYSTOLIC BLOOD PRESSURE: 151 MMHG

## 2017-03-21 DIAGNOSIS — T83.021A DISLODGED FOLEY CATHETER, INITIAL ENCOUNTER: Primary | ICD-10-CM

## 2017-03-21 DIAGNOSIS — N30.00 ACUTE CYSTITIS WITHOUT HEMATURIA: ICD-10-CM

## 2017-03-21 LAB
APPEARANCE UR: ABNORMAL
BACTERIA URNS QL MICRO: ABNORMAL /HPF
BILIRUB UR QL: NEGATIVE
COLOR UR: ABNORMAL
EPITH CASTS URNS QL MICRO: ABNORMAL /LPF
GLUCOSE UR STRIP.AUTO-MCNC: NEGATIVE MG/DL
HGB UR QL STRIP: ABNORMAL
KETONES UR QL STRIP.AUTO: NEGATIVE MG/DL
LEUKOCYTE ESTERASE UR QL STRIP.AUTO: ABNORMAL
NITRITE UR QL STRIP.AUTO: POSITIVE
PH UR STRIP: 5 [PH] (ref 5–8)
PROT UR STRIP-MCNC: 100 MG/DL
RBC #/AREA URNS HPF: ABNORMAL /HPF (ref 0–5)
SP GR UR REFRACTOMETRY: 1.01 (ref 1–1.03)
UROBILINOGEN UR QL STRIP.AUTO: 0.2 EU/DL (ref 0.2–1)
WBC URNS QL MICRO: >100 /HPF (ref 0–4)

## 2017-03-21 PROCEDURE — 51702 INSERT TEMP BLADDER CATH: CPT

## 2017-03-21 PROCEDURE — 87186 SC STD MICRODIL/AGAR DIL: CPT | Performed by: EMERGENCY MEDICINE

## 2017-03-21 PROCEDURE — 74011250636 HC RX REV CODE- 250/636: Performed by: EMERGENCY MEDICINE

## 2017-03-21 PROCEDURE — 99283 EMERGENCY DEPT VISIT LOW MDM: CPT

## 2017-03-21 PROCEDURE — 81001 URINALYSIS AUTO W/SCOPE: CPT | Performed by: EMERGENCY MEDICINE

## 2017-03-21 PROCEDURE — 3331090001 HH PPS REVENUE CREDIT

## 2017-03-21 PROCEDURE — 96372 THER/PROPH/DIAG INJ SC/IM: CPT

## 2017-03-21 PROCEDURE — 3331090002 HH PPS REVENUE DEBIT

## 2017-03-21 PROCEDURE — 87077 CULTURE AEROBIC IDENTIFY: CPT | Performed by: EMERGENCY MEDICINE

## 2017-03-21 PROCEDURE — 74011000250 HC RX REV CODE- 250: Performed by: EMERGENCY MEDICINE

## 2017-03-21 PROCEDURE — 87086 URINE CULTURE/COLONY COUNT: CPT | Performed by: EMERGENCY MEDICINE

## 2017-03-21 RX ORDER — CEFUROXIME AXETIL 500 MG/1
500 TABLET ORAL 2 TIMES DAILY
Qty: 14 TAB | Refills: 0 | Status: SHIPPED | OUTPATIENT
Start: 2017-03-21 | End: 2017-03-28

## 2017-03-21 RX ADMIN — LIDOCAINE HYDROCHLORIDE 1 G: 10 INJECTION, SOLUTION EPIDURAL; INFILTRATION; INTRACAUDAL; PERINEURAL at 11:56

## 2017-03-21 NOTE — DISCHARGE INSTRUCTIONS
Urinary Tract Infection in Women: Care Instructions  Your Care Instructions    A urinary tract infection, or UTI, is a general term for an infection anywhere between the kidneys and the urethra (where urine comes out). Most UTIs are bladder infections. They often cause pain or burning when you urinate. UTIs are caused by bacteria and can be cured with antibiotics. Be sure to complete your treatment so that the infection goes away. Follow-up care is a key part of your treatment and safety. Be sure to make and go to all appointments, and call your doctor if you are having problems. It's also a good idea to know your test results and keep a list of the medicines you take. How can you care for yourself at home? · Take your antibiotics as directed. Do not stop taking them just because you feel better. You need to take the full course of antibiotics. · Drink extra water and other fluids for the next day or two. This may help wash out the bacteria that are causing the infection. (If you have kidney, heart, or liver disease and have to limit fluids, talk with your doctor before you increase your fluid intake.)  · Avoid drinks that are carbonated or have caffeine. They can irritate the bladder. · Urinate often. Try to empty your bladder each time. · To relieve pain, take a hot bath or lay a heating pad set on low over your lower belly or genital area. Never go to sleep with a heating pad in place. To prevent UTIs  · Drink plenty of water each day. This helps you urinate often, which clears bacteria from your system. (If you have kidney, heart, or liver disease and have to limit fluids, talk with your doctor before you increase your fluid intake.)  · Consider adding cranberry juice to your diet. · Urinate when you need to. · Urinate right after you have sex. · Change sanitary pads often. · Avoid douches, bubble baths, feminine hygiene sprays, and other feminine hygiene products that have deodorants.   · After going to the bathroom, wipe from front to back. When should you call for help? Call your doctor now or seek immediate medical care if:  · Symptoms such as fever, chills, nausea, or vomiting get worse or appear for the first time. · You have new pain in your back just below your rib cage. This is called flank pain. · There is new blood or pus in your urine. · You have any problems with your antibiotic medicine. Watch closely for changes in your health, and be sure to contact your doctor if:  · You are not getting better after taking an antibiotic for 2 days. · Your symptoms go away but then come back. Where can you learn more? Go to http://joseTetra Discoverycheyanne.info/. Enter H392 in the search box to learn more about \"Urinary Tract Infection in Women: Care Instructions. \"  Current as of: August 12, 2016  Content Version: 11.1  © 8934-2890 [employer]. Care instructions adapted under license by [employer] (which disclaims liability or warranty for this information). If you have questions about a medical condition or this instruction, always ask your healthcare professional. [place] [address] any warranty or liability for your use of this information. We hope that we have addressed all of your medical concerns. The examination and treatment you received in the Emergency Department were for an emergent problem and were not intended as complete care. It is important that you follow up with your healthcare provider(s) for ongoing care. If your symptoms worsen or do not improve as expected, and you are unable to reach your usual health care provider(s), you should return to the Emergency Department. Today's healthcare is undergoing tremendous change, and patient satisfaction surveys are one of the many tools to assess the quality of medical care.   You may receive a survey from the [employer] regarding your experience in the Emergency Department. I hope that your experience has been completely positive, particularly the medical care that I provided. As such, please participate in the survey; anything less than excellent does not meet my expectations or intentions. [facility] and [employer] participate in nationally recognized quality of care measures. If your blood pressure is greater than 120/80, as reported below, we urge that you seek medical care to address the potential of high blood pressure, commonly known as hypertension. Hypertension can be hereditary or can be caused by certain medical conditions, pain, stress, or \"white coat syndrome. \"       Please make an appointment with your health care provider(s) for follow up of your Emergency Department visit. VITALS:   Patient Vitals for the past 8 hrs:   Temp Pulse Resp BP SpO2   03/21/17 1159 98.7 °F (37.1 °C) 89 16 143/75 91 %   03/21/17 0929 97.5 °F (36.4 °C) (!) 101 16 (!) 157/92 92 %          Thank you for allowing us to provide you with medical care today. We realize that you have many choices for your emergency care needs. Please choose us in the future for any continued health care needs. Amanda Garcias, [address] [place] [address].   Office: [phone]            Recent Results (from the past 24 hour(s))   URINALYSIS W/MICROSCOPIC    Collection Time: 03/21/17  9:35 AM   Result Value Ref Range    Color YELLOW/STRAW      Appearance TURBID (A) CLEAR      Specific gravity 1.010 1.003 - 1.030      pH (UA) 5.0 5.0 - 8.0      Protein 100 (A) NEG mg/dL    Glucose NEGATIVE  NEG mg/dL    Ketone NEGATIVE  NEG mg/dL    Bilirubin NEGATIVE  NEG      Blood LARGE (A) NEG      Urobilinogen 0.2 0.2 - 1.0 EU/dL    Nitrites POSITIVE (A) NEG      Leukocyte Esterase LARGE (A) NEG      WBC >100 (H) 0 - 4 /hpf    RBC  0 - 5 /hpf    Epithelial cells MANY (A) FEW /lpf    Bacteria 4+ (A) NEG /hpf       No results found.

## 2017-03-21 NOTE — PROGRESS NOTES
CHANDA received call from ED staff regarding concern over pt's recurrent ED visits. Noted that pt was previously treated for bed bugs during ED visit and followed by [facility] team.  Will request [facility] SW follow up. This CM also sent message to [facility] to determine if pt is still open for homecare services. Pt is discharging home with her family. GENO Clark      1pm  Requested by staff to set up BLS transport with [facility]. Called [facility] [phone] and requested next available. Reference #[id number], 30 min to 3 hours ETA, although advised pt is ready now. GENO Clark    2:40pm  Called [facility] again to check ETA. Pt is awaiting transport home. Reference number [id number]. [facility] accepted trip, [facility]. Will check their ETA. Called [facility] Mcdaniel to check on status. After return call, [facility] hasn't received any paperwork. CM called [facility] back to request paperwork be provided to [facility] so they can complete pt's trip. GENO Zamarripa      3:30pm  Called cait patel and trip has still not been received by [facility] but she will contact crew for p/u. Delay in receiving trip from [address]     GENO Zamarripa

## 2017-03-21 NOTE — ED TRIAGE NOTES
Pt arrives via EMS from home c/o lower abdominal pain. Pt arrives with urinary catheter in place. Urine appears cloudy.

## 2017-03-21 NOTE — ED PROVIDER NOTES
HPI Comments: 80 y.o. female with past medical history significant for HTN, PNA, vertigo, GERD< hypercholesterolemia, intracerebral bleed, VRE culture positive, and s/p brain surgery who presents from home via private vehicle with chief complaint of lower abdominal pain. Pt has a chronic indwelling catheter. She was complaint free last night. Between 7 and 8 am, pt woke up in severe pain \"right where she pees\". She denies having any other complaints. No fever, chills, nausea, nor vomiting. Pt has had problems with her catheter in the past and has been seen numerous times in the ED for it. She has a Home Health nurse. She has not seen a urologist since her last ED visit. Pt and son deny noticing any change to what was being emptied into the catheter bag. There are no other acute medical concerns at this time. Social hx: Never smoker. No alcohol use. PCP: Ludy Zamora MD    Of Note, bed bugs were found the last time she was here, but they have done nothing at home to take care of them as son has not see them at home. Note written by Cody Rodriguez, as dictated by Raysa Joe MD 9:50 AM    The history is provided by the patient and a relative. A  was used ( phone used). Past Medical History:   Diagnosis Date    Gastrointestinal disorder     GERD    Hypercholesterolemia     Hypertension     Intracerebral bleed (HCC)     Pneumonia, organism unspecified 5/10/2013    Vertigo     VRE (vancomycin resistant enterococcus) culture positive        Past Surgical History:   Procedure Laterality Date    NEUROLOGICAL PROCEDURE UNLISTED      brain surgery         History reviewed. No pertinent family history. Social History     Social History    Marital status: SINGLE     Spouse name: N/A    Number of children: N/A    Years of education: N/A     Occupational History    Not on file.      Social History Main Topics    Smoking status: Never Smoker    Smokeless tobacco: Not on file    Alcohol use No    Drug use: No    Sexual activity: Not on file     Other Topics Concern    Not on file     Social History Narrative         ALLERGIES: Aspirin    Review of Systems   Constitutional: Negative for chills and fever. HENT: Negative for rhinorrhea and sore throat. Respiratory: Negative for cough and shortness of breath. Cardiovascular: Negative for chest pain. Gastrointestinal: Positive for abdominal pain. Negative for diarrhea, nausea and vomiting. Genitourinary: Negative for dysuria and urgency. Musculoskeletal: Negative for arthralgias and back pain. Skin: Negative for rash. Neurological: Negative for dizziness, weakness and light-headedness.        Vitals:    03/21/17 0929   BP: (!) 157/92   Pulse: (!) 101   Resp: 16   Temp: 97.5 °F (36.4 °C)   SpO2: 92%   Weight: 39.9 kg (88 lb)   Height: 5' (1.524 m)            Physical Exam     Const:  No acute distress, well developed, well nourished  Head:  Atraumatic, normocephalic  Eyes:  PERRL, conjunctiva normal, no scleral icterus  Neck:  Supple, trachea midline  Cardiovascular:  RRR, no murmurs, no gallops, no rubs  Resp:  No resp distress, no increased work of breathing, no wheezes, no rhonchi, no rales,  Abd:  Soft, non-tender, non-distended, no rebound, no guarding, no CVA tenderness  :  Deferred  MSK:  No pedal edema, normal ROM  Neuro:  Alert and oriented x3, no cranial nerve defect  Skin:  Warm, dry, intact  Psych: normal mood and affect, behavior is normal, judgement and thought content is normal    Note written by Cody Hogan, as dictated by Erica Aviles MD 9:50 AM      MDM  Number of Diagnoses or Management Options  Acute cystitis without hematuria:   Dislodged Chambers catheter, initial encounter:      Amount and/or Complexity of Data Reviewed  Clinical lab tests: ordered and reviewed  Tests in the radiology section of CPT®: ordered and reviewed  Review and summarize past medical records: yes    Patient Progress  Patient progress: stable    ED Course     Pt. Presents to the ER with pain from a dislodged gramajo catheter. She says that she feels much better and is symptom free after her gramajo was replaced. No other complaints. Pt. Appears to have a UTI. I will start her on ceftin. Pt. To f/u with her PCP or return to the ER with worsening sx. Procedures    PROGRESS NOTE:  9:55 AM  Prior to exam, gramajo cath which was mostly out with balloon still intact was replaced by nurse. Pt is now without complaint.

## 2017-03-22 PROCEDURE — 3331090001 HH PPS REVENUE CREDIT

## 2017-03-22 PROCEDURE — 3331090002 HH PPS REVENUE DEBIT

## 2017-03-23 PROCEDURE — 3331090002 HH PPS REVENUE DEBIT

## 2017-03-23 PROCEDURE — 3331090001 HH PPS REVENUE CREDIT

## 2017-03-24 PROCEDURE — 3331090001 HH PPS REVENUE CREDIT

## 2017-03-24 PROCEDURE — 3331090002 HH PPS REVENUE DEBIT

## 2017-03-25 PROCEDURE — 3331090001 HH PPS REVENUE CREDIT

## 2017-03-25 PROCEDURE — 3331090002 HH PPS REVENUE DEBIT

## 2017-03-26 PROCEDURE — 3331090002 HH PPS REVENUE DEBIT

## 2017-03-26 PROCEDURE — 3331090001 HH PPS REVENUE CREDIT

## 2017-03-27 PROCEDURE — 3331090002 HH PPS REVENUE DEBIT

## 2017-03-27 PROCEDURE — 3331090001 HH PPS REVENUE CREDIT

## 2017-03-28 PROCEDURE — 3331090001 HH PPS REVENUE CREDIT

## 2017-03-28 PROCEDURE — 3331090002 HH PPS REVENUE DEBIT

## 2017-03-29 PROCEDURE — 3331090002 HH PPS REVENUE DEBIT

## 2017-03-29 PROCEDURE — 3331090001 HH PPS REVENUE CREDIT

## 2017-03-30 PROCEDURE — 3331090001 HH PPS REVENUE CREDIT

## 2017-03-30 PROCEDURE — 3331090002 HH PPS REVENUE DEBIT

## 2017-03-31 PROCEDURE — 3331090002 HH PPS REVENUE DEBIT

## 2017-03-31 PROCEDURE — 3331090001 HH PPS REVENUE CREDIT

## 2017-04-01 PROCEDURE — 3331090001 HH PPS REVENUE CREDIT

## 2017-04-01 PROCEDURE — 3331090002 HH PPS REVENUE DEBIT

## 2017-04-02 PROCEDURE — 3331090002 HH PPS REVENUE DEBIT

## 2017-04-02 PROCEDURE — 3331090001 HH PPS REVENUE CREDIT

## 2017-04-03 PROCEDURE — 3331090002 HH PPS REVENUE DEBIT

## 2017-04-03 PROCEDURE — 3331090001 HH PPS REVENUE CREDIT

## 2017-04-04 PROCEDURE — 3331090001 HH PPS REVENUE CREDIT

## 2017-04-04 PROCEDURE — 3331090002 HH PPS REVENUE DEBIT

## 2017-04-05 ENCOUNTER — HOSPITAL ENCOUNTER (EMERGENCY)
Age: 82
Discharge: HOME | End: 2017-04-05
Attending: EMERGENCY MEDICINE
Payer: MEDICARE

## 2017-04-05 VITALS
TEMPERATURE: 98.9 F | OXYGEN SATURATION: 96 % | SYSTOLIC BLOOD PRESSURE: 133 MMHG | RESPIRATION RATE: 18 BRPM | DIASTOLIC BLOOD PRESSURE: 62 MMHG

## 2017-04-05 DIAGNOSIS — N39.0 URINARY TRACT INFECTION WITHOUT HEMATURIA, SITE UNSPECIFIED: Primary | ICD-10-CM

## 2017-04-05 LAB
ALBUMIN SERPL BCP-MCNC: 3.4 G/DL (ref 3.5–5)
ALBUMIN/GLOB SERPL: 0.9 {RATIO} (ref 1.1–2.2)
ALP SERPL-CCNC: 106 U/L (ref 45–117)
ALT SERPL-CCNC: 28 U/L (ref 12–78)
ANION GAP BLD CALC-SCNC: 9 MMOL/L (ref 5–15)
APPEARANCE UR: ABNORMAL
AST SERPL W P-5'-P-CCNC: 44 U/L (ref 15–37)
BACTERIA URNS QL MICRO: ABNORMAL /HPF
BASOPHILS # BLD AUTO: 0 K/UL (ref 0–0.1)
BASOPHILS # BLD: 0 % (ref 0–1)
BILIRUB SERPL-MCNC: 0.6 MG/DL (ref 0.2–1)
BILIRUB UR QL: NEGATIVE
BUN SERPL-MCNC: 13 MG/DL (ref 6–20)
BUN/CREAT SERPL: 15 (ref 12–20)
CALCIUM SERPL-MCNC: 8.6 MG/DL (ref 8.5–10.1)
CAOX CRY URNS QL MICRO: ABNORMAL
CHLORIDE SERPL-SCNC: 103 MMOL/L (ref 97–108)
CO2 SERPL-SCNC: 30 MMOL/L (ref 21–32)
COLOR UR: ABNORMAL
CREAT SERPL-MCNC: 0.87 MG/DL (ref 0.55–1.02)
EOSINOPHIL # BLD: 0.4 K/UL (ref 0–0.4)
EOSINOPHIL NFR BLD: 6 % (ref 0–7)
EPITH CASTS URNS QL MICRO: ABNORMAL /LPF
ERYTHROCYTE [DISTWIDTH] IN BLOOD BY AUTOMATED COUNT: 15 % (ref 11.5–14.5)
GLOBULIN SER CALC-MCNC: 3.7 G/DL (ref 2–4)
GLUCOSE SERPL-MCNC: 96 MG/DL (ref 65–100)
GLUCOSE UR STRIP.AUTO-MCNC: NEGATIVE MG/DL
HCT VFR BLD AUTO: 33.8 % (ref 35–47)
HGB BLD-MCNC: 11.1 G/DL (ref 11.5–16)
HGB UR QL STRIP: NEGATIVE
KETONES UR QL STRIP.AUTO: NEGATIVE MG/DL
LEUKOCYTE ESTERASE UR QL STRIP.AUTO: ABNORMAL
LYMPHOCYTES # BLD AUTO: 14 % (ref 12–49)
LYMPHOCYTES # BLD: 1 K/UL (ref 0.8–3.5)
MCH RBC QN AUTO: 25.1 PG (ref 26–34)
MCHC RBC AUTO-ENTMCNC: 32.8 G/DL (ref 30–36.5)
MCV RBC AUTO: 76.3 FL (ref 80–99)
MONOCYTES # BLD: 0.8 K/UL (ref 0–1)
MONOCYTES NFR BLD AUTO: 12 % (ref 5–13)
NEUTS SEG # BLD: 4.8 K/UL (ref 1.8–8)
NEUTS SEG NFR BLD AUTO: 68 % (ref 32–75)
NITRITE UR QL STRIP.AUTO: POSITIVE
PH UR STRIP: 6.5 [PH] (ref 5–8)
PLATELET # BLD AUTO: 213 K/UL (ref 150–400)
POTASSIUM SERPL-SCNC: 3.4 MMOL/L (ref 3.5–5.1)
PROT SERPL-MCNC: 7.1 G/DL (ref 6.4–8.2)
PROT UR STRIP-MCNC: NEGATIVE MG/DL
RBC # BLD AUTO: 4.43 M/UL (ref 3.8–5.2)
RBC #/AREA URNS HPF: ABNORMAL /HPF (ref 0–5)
SODIUM SERPL-SCNC: 142 MMOL/L (ref 136–145)
SP GR UR REFRACTOMETRY: 1.01 (ref 1–1.03)
UROBILINOGEN UR QL STRIP.AUTO: 1 EU/DL (ref 0.2–1)
WBC # BLD AUTO: 7.1 K/UL (ref 3.6–11)
WBC URNS QL MICRO: ABNORMAL /HPF (ref 0–4)

## 2017-04-05 PROCEDURE — 99283 EMERGENCY DEPT VISIT LOW MDM: CPT

## 2017-04-05 PROCEDURE — 80053 COMPREHEN METABOLIC PANEL: CPT | Performed by: EMERGENCY MEDICINE

## 2017-04-05 PROCEDURE — 74011250637 HC RX REV CODE- 250/637: Performed by: EMERGENCY MEDICINE

## 2017-04-05 PROCEDURE — 99284 EMERGENCY DEPT VISIT MOD MDM: CPT

## 2017-04-05 PROCEDURE — 85025 COMPLETE CBC W/AUTO DIFF WBC: CPT | Performed by: EMERGENCY MEDICINE

## 2017-04-05 PROCEDURE — 3331090002 HH PPS REVENUE DEBIT

## 2017-04-05 PROCEDURE — 81001 URINALYSIS AUTO W/SCOPE: CPT | Performed by: EMERGENCY MEDICINE

## 2017-04-05 PROCEDURE — 3331090001 HH PPS REVENUE CREDIT

## 2017-04-05 PROCEDURE — 36415 COLL VENOUS BLD VENIPUNCTURE: CPT | Performed by: EMERGENCY MEDICINE

## 2017-04-05 RX ORDER — CEPHALEXIN 500 MG/1
500 CAPSULE ORAL 3 TIMES DAILY
Qty: 20 CAP | Refills: 0 | Status: SHIPPED | OUTPATIENT
Start: 2017-04-05 | End: 2017-04-12

## 2017-04-05 RX ORDER — CEPHALEXIN 500 MG/1
500 CAPSULE ORAL
Status: COMPLETED | OUTPATIENT
Start: 2017-04-05 | End: 2017-04-05

## 2017-04-05 RX ORDER — CEPHALEXIN 500 MG/1
500 CAPSULE ORAL 3 TIMES DAILY
Qty: 20 CAP | Refills: 0 | Status: SHIPPED | OUTPATIENT
Start: 2017-04-05 | End: 2017-04-05

## 2017-04-05 RX ADMIN — CEPHALEXIN 500 MG: 500 CAPSULE ORAL at 14:04

## 2017-04-05 NOTE — ED TRIAGE NOTES
Triage Note: Patient is coming in [facility] EMS with complaints of not feeling well this morning. Patient has been seen in the ED multiple times in the month of March. Patient had bed bugs present on the last visit to the ED.

## 2017-04-05 NOTE — ED NOTES
Pt son in room making inappropriate comments to this RN. Informed pts son that he needs to refrain from making such comments. He stated, \"okay okay, i don't know why nurses don't like it when i say comments like that. \"  Pts door shut.

## 2017-04-05 NOTE — ED PROVIDER NOTES
HPI Comments: 80 y.o. female with past medical history significant for hypertension, pneumonia, vertigo, GERD and hypercholesterolemia who presents from home with chief complaint of difficulty urinating. Patient does not speak, history of provided by the son who is a poor historian. Per son, patient lives at home with him and does not walk. She is able to eat by herself. She arrives today after experiencing difficulty urinating since this morning. Son says the patient does not have any other issues. Patient is on O2 at home 24/7. There are no other acute medical concerns at this time. Social hx: Nonsmoker, no alcohol use    PCP: Cody Cruz MD    Note written by fadia Casey, as dictated by Adela Toro MD 11:58 AM      The history is provided by the patient. Past Medical History:   Diagnosis Date    Gastrointestinal disorder     GERD    Hypercholesterolemia     Hypertension     Infestation by bed bug     Intracerebral bleed (HCC)     Pneumonia, organism unspecified 5/10/2013    Vertigo     VRE (vancomycin resistant enterococcus) culture positive        Past Surgical History:   Procedure Laterality Date    NEUROLOGICAL PROCEDURE UNLISTED      brain surgery         History reviewed. No pertinent family history. Social History     Social History    Marital status: SINGLE     Spouse name: N/A    Number of children: N/A    Years of education: N/A     Occupational History    Not on file. Social History Main Topics    Smoking status: Never Smoker    Smokeless tobacco: Not on file    Alcohol use No    Drug use: No    Sexual activity: Not on file     Other Topics Concern    Not on file     Social History Narrative         ALLERGIES: Aspirin    Review of Systems   Constitutional: Negative for appetite change, chills and fever. HENT: Negative for rhinorrhea, sore throat and trouble swallowing. Eyes: Negative for photophobia.    Respiratory: Negative for cough and shortness of breath. Cardiovascular: Negative for chest pain and palpitations. Gastrointestinal: Negative for abdominal pain, nausea and vomiting. Genitourinary: Positive for difficulty urinating. Negative for dysuria, frequency and hematuria. Musculoskeletal: Negative for arthralgias. Neurological: Negative for dizziness, syncope and weakness. Psychiatric/Behavioral: Negative for behavioral problems. The patient is not nervous/anxious. All other systems reviewed and are negative. Vitals:    04/05/17 1123   BP: 133/89   Resp: 18   Temp: 98.9 °F (37.2 °C)   SpO2: 96%            Physical Exam   Constitutional: She appears well-developed and well-nourished. Elderly. Frail. Cachectic. HENT:   Head: Normocephalic and atraumatic. Mouth/Throat: Oropharynx is clear and moist.   Eyes:   Atrophic left eye. Neck: Normal range of motion. Neck supple. Cardiovascular: Normal rate, regular rhythm, normal heart sounds and intact distal pulses. Exam reveals no gallop and no friction rub. No murmur heard. Pulmonary/Chest: Effort normal. No respiratory distress. She has no wheezes. She has no rales. Abdominal: Soft. There is no tenderness. There is no rebound. No suprapubic mass. Musculoskeletal: Normal range of motion. She exhibits no tenderness. Neurological: She is alert. No cranial nerve deficit. Motor; symmetric   Skin: No erythema. Psychiatric: She has a normal mood and affect. Her behavior is normal.   Nursing note and vitals reviewed. Note written by fadia Abbasi, as dictated by Cassia Mas MD 11:58 AM       [facility]  ED Course       Procedures  1:32 PM  Abner Gracia has evaluated this patient multiple times. Referred her to adult protective services in January. They did not find a significant problem. Home Health went out there one time but weren't able to contact them by phone. She will re-eval the patient, report to APS again.

## 2017-04-05 NOTE — DISCHARGE INSTRUCTIONS
Urinary Tract Infection in Women: Care Instructions  Your Care Instructions    A urinary tract infection, or UTI, is a general term for an infection anywhere between the kidneys and the urethra (where urine comes out). Most UTIs are bladder infections. They often cause pain or burning when you urinate. UTIs are caused by bacteria and can be cured with antibiotics. Be sure to complete your treatment so that the infection goes away. Follow-up care is a key part of your treatment and safety. Be sure to make and go to all appointments, and call your doctor if you are having problems. It's also a good idea to know your test results and keep a list of the medicines you take. How can you care for yourself at home? · Take your antibiotics as directed. Do not stop taking them just because you feel better. You need to take the full course of antibiotics. · Drink extra water and other fluids for the next day or two. This may help wash out the bacteria that are causing the infection. (If you have kidney, heart, or liver disease and have to limit fluids, talk with your doctor before you increase your fluid intake.)  · Avoid drinks that are carbonated or have caffeine. They can irritate the bladder. · Urinate often. Try to empty your bladder each time. · To relieve pain, take a hot bath or lay a heating pad set on low over your lower belly or genital area. Never go to sleep with a heating pad in place. To prevent UTIs  · Drink plenty of water each day. This helps you urinate often, which clears bacteria from your system. (If you have kidney, heart, or liver disease and have to limit fluids, talk with your doctor before you increase your fluid intake.)  · Urinate when you need to. · Urinate right after you have sex. · Change sanitary pads often. · Avoid douches, bubble baths, feminine hygiene sprays, and other feminine hygiene products that have deodorants.   · After going to the bathroom, wipe from front to back.  When should you call for help? Call your doctor now or seek immediate medical care if:  · Symptoms such as fever, chills, nausea, or vomiting get worse or appear for the first time. · You have new pain in your back just below your rib cage. This is called flank pain. · There is new blood or pus in your urine. · You have any problems with your antibiotic medicine. Watch closely for changes in your health, and be sure to contact your doctor if:  · You are not getting better after taking an antibiotic for 2 days. · Your symptoms go away but then come back. Where can you learn more? Go to http://jose-cheyanne.info/. Enter O139 in the search box to learn more about \"Urinary Tract Infection in Women: Care Instructions. \"  Current as of: November 28, 2016  Content Version: 11.2  © 8709-5849 [employer]. Care instructions adapted under license by [employer] (which disclaims liability or warranty for this information). If you have questions about a medical condition or this instruction, always ask your healthcare professional. [address] any warranty or liability for your use of this information. Recent Results (from the past 12 hour(s))   CBC WITH AUTOMATED DIFF    Collection Time: 04/05/17 12:05 PM   Result Value Ref Range    WBC 7.1 3.6 - 11.0 K/uL    RBC 4.43 3.80 - 5.20 M/uL    HGB 11.1 (L) 11.5 - 16.0 g/dL    HCT 33.8 (L) 35.0 - 47.0 %    MCV 76.3 (L) 80.0 - 99.0 FL    MCH 25.1 (L) 26.0 - 34.0 PG    MCHC 32.8 30.0 - 36.5 g/dL    RDW 15.0 (H) 11.5 - 14.5 %    PLATELET 570 116 - 457 K/uL    NEUTROPHILS 68 32 - 75 %    LYMPHOCYTES 14 12 - 49 %    MONOCYTES 12 5 - 13 %    EOSINOPHILS 6 0 - 7 %    BASOPHILS 0 0 - 1 %    ABS. NEUTROPHILS 4.8 1.8 - 8.0 K/UL    ABS. LYMPHOCYTES 1.0 0.8 - 3.5 K/UL    ABS. MONOCYTES 0.8 0.0 - 1.0 K/UL    ABS. EOSINOPHILS 0.4 0.0 - 0.4 K/UL    ABS.  BASOPHILS 0.0 0.0 - 0.1 K/UL   METABOLIC PANEL, COMPREHENSIVE Collection Time: 04/05/17 12:05 PM   Result Value Ref Range    Sodium 142 136 - 145 mmol/L    Potassium 3.4 (L) 3.5 - 5.1 mmol/L    Chloride 103 97 - 108 mmol/L    CO2 30 21 - 32 mmol/L    Anion gap 9 5 - 15 mmol/L    Glucose 96 65 - 100 mg/dL    BUN 13 6 - 20 MG/DL    Creatinine 0.87 0.55 - 1.02 MG/DL    BUN/Creatinine ratio 15 12 - 20      GFR est AA >60 >60 ml/min/1.73m2    GFR est non-AA >60 >60 ml/min/1.73m2    Calcium 8.6 8.5 - 10.1 MG/DL    Bilirubin, total 0.6 0.2 - 1.0 MG/DL    ALT (SGPT) 28 12 - 78 U/L    AST (SGOT) 44 (H) 15 - 37 U/L    Alk.  phosphatase 106 45 - 117 U/L    Protein, total 7.1 6.4 - 8.2 g/dL    Albumin 3.4 (L) 3.5 - 5.0 g/dL    Globulin 3.7 2.0 - 4.0 g/dL    A-G Ratio 0.9 (L) 1.1 - 2.2     URINALYSIS W/ RFLX MICROSCOPIC    Collection Time: 04/05/17 12:18 PM   Result Value Ref Range    Color YELLOW/STRAW      Appearance CLOUDY (A) CLEAR      Specific gravity 1.015 1.003 - 1.030      pH (UA) 6.5 5.0 - 8.0      Protein NEGATIVE  NEG mg/dL    Glucose NEGATIVE  NEG mg/dL    Ketone NEGATIVE  NEG mg/dL    Bilirubin NEGATIVE  NEG      Blood NEGATIVE  NEG      Urobilinogen 1.0 0.2 - 1.0 EU/dL    Nitrites POSITIVE (A) NEG      Leukocyte Esterase LARGE (A) NEG      WBC 10-20 0 - 4 /hpf    RBC 5-10 0 - 5 /hpf    Epithelial cells FEW FEW /lpf    Bacteria 4+ (A) NEG /hpf    CA Oxalate crystals FEW (A) NEG

## 2017-04-05 NOTE — PROGRESS NOTES
SSED/CM consult received and appreciated. EMR reviewed (patient has had 12 ED visits in past 6 months. History significant for dementia. Patient presents to the ED w/ complaints of not feeling well. Met w/patient and son [de-identified] Chrissy - introduced to role of CM. Patient Millie Manley known by this writer. CM referred patient to [facility] 2/1/17 agency was unable to open secondary to inability to reach patient/son. On 2/16/17  Patient represented to the ED - CM placed call to agency requested new order - this writer obtained and [facility] RN Liaison was able to visit patient/son before discharging home. Appointments obtained for  Urologist 2/24/17 and walking appointment requested for 2/17/17 w/ Patrick Batres. Informed son of request and verbalized he will use taxi to get patient to appointment. Updates provided to [address] [place] [address] of caregiver concerns and lack of medical f/u in the community. APS call placed to [facility] in January by SSED/ NN however case did not indicate additional investigation. CM informed MD would ask son about NH Placement and if declined would call report to [facility]. Son informed of medical concerns w/ 12 ED visits in 6 months and lack of follow up in community w/ Urologist.  states did not know about appointment and patient has been to [facility] \" I think February. \" Asked son if able to meet patients needs at home and if NH Placement could be considered. Son replied - Nora Honeycutt will go home no NH - I take care of her . My sister Deena Aguilar is at home but has bad legs. \" [facility] patient once in February however made 11 call and closed case - was unable to reach caregiver to schedule additional home sessions. No additional needs verbalized by patient/son. Discussed ambulance transportation - patient is O2 dependent. Sa states patient has worn O2 for over 1 year. Referral sent to [facility] - ETA received for 1500 then time updated to 1600.  Call placed to [facility] Liaison informed of delay - crew onsite at [address] Updates provided to Nursing and patient/son. Call placed to [address] report provided to Sammy Mason also informed of bedbugs present when patient arrived to the ED on 3/7/17. [place] and [place] are [place] providers. Care Management Interventions  PCP Verified by CM: Yes  Last Visit to PCP: 02/10/17  Mode of Transport at Discharge: [address]  [address] Time of Discharge: 1114  Transition of Care Consult (CM Consult):  Other (LOC needs)  MyChart Signup: No  Discharge Durable Medical Equipment: No  Physical Therapy Consult: No  Occupational Therapy Consult: No  Speech Therapy Consult: No  Current Support Network: Lives with Caregiver, Own Home  Confirm Follow Up Transport: Other (see comment) (ambulance)  Plan discussed with Pt/Family/Caregiver: Yes  Discharge Location  Discharge Placement: Home

## 2017-04-06 PROCEDURE — 3331090001 HH PPS REVENUE CREDIT

## 2017-04-06 PROCEDURE — 3331090002 HH PPS REVENUE DEBIT

## 2017-04-07 PROCEDURE — 3331090001 HH PPS REVENUE CREDIT

## 2017-04-07 PROCEDURE — 3331090002 HH PPS REVENUE DEBIT

## 2017-04-08 PROCEDURE — 3331090002 HH PPS REVENUE DEBIT

## 2017-04-08 PROCEDURE — 3331090001 HH PPS REVENUE CREDIT

## 2017-04-09 PROCEDURE — 3331090001 HH PPS REVENUE CREDIT

## 2017-04-09 PROCEDURE — 3331090002 HH PPS REVENUE DEBIT

## 2017-04-10 PROCEDURE — 3331090001 HH PPS REVENUE CREDIT

## 2017-04-10 PROCEDURE — 3331090002 HH PPS REVENUE DEBIT

## 2017-04-11 PROCEDURE — 3331090002 HH PPS REVENUE DEBIT

## 2017-04-11 PROCEDURE — 3331090001 HH PPS REVENUE CREDIT

## 2017-04-12 PROCEDURE — 3331090001 HH PPS REVENUE CREDIT

## 2017-04-12 PROCEDURE — 3331090002 HH PPS REVENUE DEBIT

## 2017-04-13 PROCEDURE — 3331090002 HH PPS REVENUE DEBIT

## 2017-04-13 PROCEDURE — 3331090001 HH PPS REVENUE CREDIT

## 2017-04-14 PROCEDURE — 3331090001 HH PPS REVENUE CREDIT

## 2017-04-14 PROCEDURE — 3331090002 HH PPS REVENUE DEBIT

## 2017-04-15 PROCEDURE — 3331090001 HH PPS REVENUE CREDIT

## 2017-04-15 PROCEDURE — 3331090002 HH PPS REVENUE DEBIT

## 2017-04-16 PROCEDURE — 3331090001 HH PPS REVENUE CREDIT

## 2017-04-16 PROCEDURE — 3331090002 HH PPS REVENUE DEBIT

## 2017-04-17 PROCEDURE — 3331090002 HH PPS REVENUE DEBIT

## 2017-04-17 PROCEDURE — 3331090001 HH PPS REVENUE CREDIT

## 2017-04-18 ENCOUNTER — HOME HEALTH ADMISSION (OUTPATIENT)
Dept: HOME HEALTH SERVICES | Facility: HOME HEALTH | Age: 82
End: 2017-04-18
Payer: MEDICARE

## 2017-04-18 ENCOUNTER — HOSPITAL ENCOUNTER (EMERGENCY)
Age: 82
Discharge: HOME | End: 2017-04-18
Attending: EMERGENCY MEDICINE
Payer: MEDICARE

## 2017-04-18 ENCOUNTER — APPOINTMENT (OUTPATIENT)
Dept: GENERAL RADIOLOGY | Age: 82
End: 2017-04-18
Attending: EMERGENCY MEDICINE
Payer: MEDICARE

## 2017-04-18 VITALS
DIASTOLIC BLOOD PRESSURE: 92 MMHG | RESPIRATION RATE: 18 BRPM | TEMPERATURE: 98.3 F | HEART RATE: 100 BPM | OXYGEN SATURATION: 95 % | HEIGHT: 60 IN | SYSTOLIC BLOOD PRESSURE: 169 MMHG

## 2017-04-18 DIAGNOSIS — N39.0 URINARY TRACT INFECTION ASSOCIATED WITH INDWELLING URETHRAL CATHETER, INITIAL ENCOUNTER (HCC): ICD-10-CM

## 2017-04-18 DIAGNOSIS — T83.511A URINARY TRACT INFECTION ASSOCIATED WITH INDWELLING URETHRAL CATHETER, INITIAL ENCOUNTER (HCC): ICD-10-CM

## 2017-04-18 DIAGNOSIS — T83.9XXD FOLEY CATHETER PROBLEM, SUBSEQUENT ENCOUNTER: Primary | ICD-10-CM

## 2017-04-18 LAB
AMORPH CRY URNS QL MICRO: ABNORMAL
APPEARANCE UR: ABNORMAL
BACTERIA URNS QL MICRO: ABNORMAL /HPF
BILIRUB UR QL: NEGATIVE
COLOR UR: ABNORMAL
EPITH CASTS URNS QL MICRO: ABNORMAL /LPF
GLUCOSE UR STRIP.AUTO-MCNC: NEGATIVE MG/DL
HGB UR QL STRIP: ABNORMAL
KETONES UR QL STRIP.AUTO: NEGATIVE MG/DL
LEUKOCYTE ESTERASE UR QL STRIP.AUTO: ABNORMAL
NITRITE UR QL STRIP.AUTO: NEGATIVE
PH UR STRIP: 6 [PH] (ref 5–8)
PROT UR STRIP-MCNC: 100 MG/DL
RBC #/AREA URNS HPF: ABNORMAL /HPF (ref 0–5)
SP GR UR REFRACTOMETRY: 1.02 (ref 1–1.03)
UROBILINOGEN UR QL STRIP.AUTO: 0.2 EU/DL (ref 0.2–1)
WBC CASTS URNS QL MICRO: ABNORMAL /LPF
WBC URNS QL MICRO: >100 /HPF (ref 0–4)

## 2017-04-18 PROCEDURE — 87077 CULTURE AEROBIC IDENTIFY: CPT | Performed by: EMERGENCY MEDICINE

## 2017-04-18 PROCEDURE — 77030005514 HC CATH URETH FOL14 BARD -A

## 2017-04-18 PROCEDURE — 87086 URINE CULTURE/COLONY COUNT: CPT | Performed by: EMERGENCY MEDICINE

## 2017-04-18 PROCEDURE — 51702 INSERT TEMP BLADDER CATH: CPT

## 2017-04-18 PROCEDURE — 96372 THER/PROPH/DIAG INJ SC/IM: CPT

## 2017-04-18 PROCEDURE — 74011000250 HC RX REV CODE- 250: Performed by: EMERGENCY MEDICINE

## 2017-04-18 PROCEDURE — 81001 URINALYSIS AUTO W/SCOPE: CPT | Performed by: EMERGENCY MEDICINE

## 2017-04-18 PROCEDURE — 71010 XR CHEST PORT: CPT

## 2017-04-18 PROCEDURE — 99284 EMERGENCY DEPT VISIT MOD MDM: CPT

## 2017-04-18 PROCEDURE — 74011250636 HC RX REV CODE- 250/636: Performed by: EMERGENCY MEDICINE

## 2017-04-18 PROCEDURE — 87186 SC STD MICRODIL/AGAR DIL: CPT | Performed by: EMERGENCY MEDICINE

## 2017-04-18 RX ORDER — CEPHALEXIN 250 MG/1
500 CAPSULE ORAL 4 TIMES DAILY
Qty: 40 CAP | Refills: 0 | Status: SHIPPED | OUTPATIENT
Start: 2017-04-18 | End: 2018-01-01 | Stop reason: ALTCHOICE

## 2017-04-18 RX ORDER — LIDOCAINE HYDROCHLORIDE 10 MG/ML
1 INJECTION INFILTRATION; PERINEURAL ONCE
Status: DISCONTINUED | OUTPATIENT
Start: 2017-04-18 | End: 2017-04-18

## 2017-04-18 RX ADMIN — CEFTRIAXONE SODIUM 500 MG: 500 INJECTION, POWDER, FOR SOLUTION INTRAMUSCULAR; INTRAVENOUS at 10:36

## 2017-04-18 NOTE — ED NOTES
Called patients daughter, per patients request. Patient daughter reports that she doesn't understand what this RN is saying and asked that I call her son, Arianne Erickson (?) at [phone]. Called patients grandson and he did not answer phone.

## 2017-04-18 NOTE — ED NOTES
Patients son has not arrived to ED as expected. Language Line utilized in speaking with patient. Per patient, she felt that something was happening to her bladder. Reports that she is feeling better with new catheter. States that she has had someone to come out to the house to work with her gramajo, but they only spoke [place]. Also reports that when she called 911 today, they only spoke [place] as well. Patient speaks very little english, says OK and pee-pee.

## 2017-04-18 NOTE — DISCHARGE INSTRUCTIONS
Urinary Tract Infection in Women: Care Instructions  Your Care Instructions    A urinary tract infection, or UTI, is a general term for an infection anywhere between the kidneys and the urethra (where urine comes out). Most UTIs are bladder infections. They often cause pain or burning when you urinate. UTIs are caused by bacteria and can be cured with antibiotics. Be sure to complete your treatment so that the infection goes away. Follow-up care is a key part of your treatment and safety. Be sure to make and go to all appointments, and call your doctor if you are having problems. It's also a good idea to know your test results and keep a list of the medicines you take. How can you care for yourself at home? · Take your antibiotics as directed. Do not stop taking them just because you feel better. You need to take the full course of antibiotics. · Drink extra water and other fluids for the next day or two. This may help wash out the bacteria that are causing the infection. (If you have kidney, heart, or liver disease and have to limit fluids, talk with your doctor before you increase your fluid intake.)  · Avoid drinks that are carbonated or have caffeine. They can irritate the bladder. · Urinate often. Try to empty your bladder each time. · To relieve pain, take a hot bath or lay a heating pad set on low over your lower belly or genital area. Never go to sleep with a heating pad in place. To prevent UTIs  · Drink plenty of water each day. This helps you urinate often, which clears bacteria from your system. (If you have kidney, heart, or liver disease and have to limit fluids, talk with your doctor before you increase your fluid intake.)  · Urinate when you need to. · Urinate right after you have sex. · Change sanitary pads often. · Avoid douches, bubble baths, feminine hygiene sprays, and other feminine hygiene products that have deodorants.   · After going to the bathroom, wipe from front to back.  When should you call for help? Call your doctor now or seek immediate medical care if:  · Symptoms such as fever, chills, nausea, or vomiting get worse or appear for the first time. · You have new pain in your back just below your rib cage. This is called flank pain. · There is new blood or pus in your urine. · You have any problems with your antibiotic medicine. Watch closely for changes in your health, and be sure to contact your doctor if:  · You are not getting better after taking an antibiotic for 2 days. · Your symptoms go away but then come back. Where can you learn more? Go to http://jose-cheyanne.info/. Enter Y010 in the search box to learn more about \"Urinary Tract Infection in Women: Care Instructions. \"  Current as of: November 28, 2016  Content Version: 11.2  © 5763-9577 [employer]. Care instructions adapted under license by [employer] (which disclaims liability or warranty for this information). If you have questions about a medical condition or this instruction, always ask your healthcare professional. [address] any warranty or liability for your use of this information.

## 2017-04-18 NOTE — ED NOTES
Assisted patient in dressing herself. Vital signs obtained. [facility] called for transport home. Patient awaiting arrival of EMS to go home.

## 2017-04-18 NOTE — ED PROVIDER NOTES
HPI Comments: 80 y.o. female with past medical history significant for HTN, PNA, vertigo, GERD, hypercholesterolemia, intracerebral bleed, infestation by bed bug, VRE culture positive, and s/p brain surgery who presents from home via EMS with chief complaint of urinary catheter problem. Pt does not speak here in the ED. EMS reports that the pt was transported to the ED for a \"leaky gramajo bag\". Pt has presented to the ED a number of times for problems with indwelling cath and UTI. Son is reportedly on his way to the ED. There are no other acute medical concerns at this time. Social hx: Never smoker. No alcohol use. Lives with son. PCP: Dunia Church MD    Old Chart Review: Pt has been seen with complaints about urinary catheter mulitple times over the last year and dx with UTI on several of those occasions. Pt was last seen 13 days ago and was dx with UTI at that time. Pt was given one dose of 500 mg cephalexin in the ED and d/c home to continue same. It is also noted in past charts that the pt is on chronic O2. Full history, physical exam, and ROS unable to be obtained due to:  Nonverbal and absence of caregiver. Note written by Cody Dc, as dictated by Vesna Ortiz MD 8:47 AM      The history is provided by medical records and the EMS personnel. The history is limited by the absence of a caregiver. Past Medical History:   Diagnosis Date    Gastrointestinal disorder     GERD    Hypercholesterolemia     Hypertension     Infestation by bed bug     Intracerebral bleed (HCC)     Pneumonia, organism unspecified 5/10/2013    Vertigo     VRE (vancomycin resistant enterococcus) culture positive        Past Surgical History:   Procedure Laterality Date    NEUROLOGICAL PROCEDURE UNLISTED      brain surgery         History reviewed. No pertinent family history.     Social History     Social History    Marital status: SINGLE     Spouse name: N/A    Number of children: N/A  Years of education: N/A     Occupational History    Not on file. Social History Main Topics    Smoking status: Never Smoker    Smokeless tobacco: Not on file    Alcohol use No    Drug use: No    Sexual activity: Not on file     Other Topics Concern    Not on file     Social History Narrative         ALLERGIES: Aspirin    Review of Systems   Unable to perform ROS: Patient nonverbal (No caregiver presnt)       Vitals:    04/18/17 0830   BP: (!) 144/111   Pulse: (!) 102   Resp: 18   Temp: 98.3 °F (36.8 °C)   SpO2: 95%   Height: 5' (1.524 m)            Physical Exam   Constitutional: She is oriented to person, place, and time. She appears well-developed and well-nourished. No distress. Frail. No acute distress. HENT:   Head: Normocephalic and atraumatic. Nose: Nose normal.   Mouth/Throat: Oropharynx is clear and moist.   Eyes: Conjunctivae and EOM are normal. Pupils are equal, round, and reactive to light. No scleral icterus. Neck: Normal range of motion. Neck supple. No tracheal deviation present. No thyromegaly present. No carotid bruits noted. Neck veins slightly distended. Cardiovascular: Normal rate, regular rhythm, normal heart sounds and intact distal pulses. Exam reveals no gallop and no friction rub. No murmur heard. Pulmonary/Chest: Effort normal. No respiratory distress. She has no wheezes. She has no rales. She exhibits no tenderness. Coarse rhonchi. 96% O2 sats. Abdominal: Soft. Bowel sounds are normal. She exhibits no distension and no mass. There is no tenderness. There is no rebound and no guarding. Genitourinary:   Genitourinary Comments: Chambers catheter in place with cloudy appearing urine. Musculoskeletal: Normal range of motion. She exhibits no edema or tenderness. No peripheral edema. Lymphadenopathy:     She has no cervical adenopathy. Neurological: She is alert and oriented to person, place, and time. She has normal reflexes.  No cranial nerve deficit. Coordination normal.   Skin: Skin is warm and dry. No rash noted. No erythema. Psychiatric: She has a normal mood and affect. Her behavior is normal. Judgment and thought content normal.   Nursing note and vitals reviewed. Note written by Cody Espinosa, as dictated by Claudy Mahmood MD 8:51 AM      MDM  Number of Diagnoses or Management Options  Chambers catheter problem, subsequent encounter: new and requires workup  Urinary tract infection associated with indwelling urethral catheter, initial encounter: new and requires workup     Amount and/or Complexity of Data Reviewed  Decide to obtain previous medical records or to obtain history from someone other than the patient: yes  Review and summarize past medical records: yes    Risk of Complications, Morbidity, and/or Mortality  Presenting problems: moderate  Diagnostic procedures: moderate  Management options: moderate    Patient Progress  Patient progress: stable    ED Course       Procedures    9:00 AM  Will contact case management for repeat ED visits for same complaint    PROGRESS NOTE:  9:58 AM  Chest x-ray ordered for abnormal breath sounds (coarse rhonchi) shows no acute process. Chronic parenchymal opacities are stable. PROGRESS NOTE:  10:01 AM  Care management has seen pt and have reviewed all of past efforts by APS, Senior Services, and Case Management. They state that the son refuses to let people into the home. APS doesn't want to get involved. Will treat for UTI and d/c home to continue Keflex after IM Rocephin has been administered here. Will consult PCP and see if they can f/u with pt for recurrent problems with chronic indwelling cath. Chest x-ray shows no acute findings. Pt is has been in no acute distress while here. ]    Will again attempt to get home health assistance for more routine care and catheter maintenance.

## 2017-04-18 NOTE — PROGRESS NOTES
CM consult due to patient having multiple visits to the ER for same condition. Leaking gramajo catheter. Patient's son has been told numerous times that patient needs to be taken to her PCP's office for follow-up. I spoke with Dr. Rabia Baker and he is going to call Dr. Filiberto Carrillo to see if he could get his cooperation with family. There may have to be HH to go out and change catheter on a regular basis, or take the catheter out and teach a female family member to straight cath patient, or maybe the patient could just use depends and have them changed regularly to prevent breakdown. These ideas were discussed with Dr. Rabia Baker and he will make decision concerning patient. [phone] because patient had services with them. However patient told nurse on the language line that they had only come twice to see her and they only spoke Raad Tellez so she could not understand what they were saying. I asked Nikki Mittal if they used a language line and she said yes but, patient disagrees. Nikki Mittal also said they have difficulty making arrangements to see patient because they do not answer the phone. Today was the last day of patients services. I spoke with Dr. Rabia Baker and he is going to request HH/SW eval and treat and I will resubmit for services. Patients nurse is trying to get in touch with the daughter to find out when is a good time for [facility] to call and arrange for patients appointments. Who will be there with the patient when she comes home today because patient states her son is in the hospital. I am waiting to have these questions answered before I finish my notes. Patients nurse spoke to daughter on language line and she states that she is always home with her mother. Patient my be sent by [facility]. I have put in a call to Nikki Mittal with [phone] to tell her that they must call patients home with a language line and take a blue phone with them for interpretation.  Dr. Rabia Baker has written an resumption of [facility] and SW eval and treat orders for patient.   Spoke with Marcelle Ugarte and she stated the order must read HH/Skilled Nursing and MSW eval & treat due to patients services ending today.

## 2017-04-18 NOTE — LETTER
[address] 
[address] [facility] [id number] 
[phone] Work/School Note Date: 4/18/2017 To Whom It May concern: 
 
Messi Lindquist was seen and treated today in the emergency room by the following provider(s): 
Attending Provider: Haylie Terrazas MD.   
 
She will require Home Health and  evaluation and care as an outpatient for her condition. Sincerely, Haylie Terrazas MD

## 2017-04-18 NOTE — ED NOTES
[facility] here to transport patient back home. Discharge instructions given to [facility] to give to family members at home.

## 2017-04-18 NOTE — ED NOTES
1218: Spoke with patients daughter via language line. Patients daughters name is Horace Fisher. Daughter reports that she is always at home with the patient and that she is home now and prepared for her Mom to return home. Oumarrenzo Fisher asked that RN calls friend, João Leach, who speaks [place], at [phone]. Called this number twice earlier, no one answered. Will call again. 12:48 PM: Just got off phone with João Leach. João Leach is the ex-daughter-in-law of patient. João Leach is helping care for the patient because the patients son (who normally helps care for the patient) is at the hospital as a patient, himself. João Leach has reported that Horace Fisher doesn't speak English well and has asked that we call her to set up appointments, and she will translate to Horace Fisher.

## 2017-04-18 NOTE — ED NOTES
Patient in hallway bed and asking to go home. Language line used, patient stated \"I've been waiting a long time already, what I need for you is to take me home. \" Followed up with prashanth MCCARTY is 60 minutes.

## 2017-04-20 ENCOUNTER — HOME CARE VISIT (OUTPATIENT)
Dept: HOME HEALTH SERVICES | Facility: HOME HEALTH | Age: 82
End: 2017-04-20

## 2017-04-20 LAB
BACTERIA SPEC CULT: ABNORMAL
CC UR VC: ABNORMAL
SERVICE CMNT-IMP: ABNORMAL

## 2017-04-21 ENCOUNTER — HOME CARE VISIT (OUTPATIENT)
Dept: SCHEDULING | Facility: HOME HEALTH | Age: 82
End: 2017-04-21
Payer: MEDICARE

## 2017-04-21 PROCEDURE — 3331090002 HH PPS REVENUE DEBIT

## 2017-04-21 PROCEDURE — G0299 HHS/HOSPICE OF RN EA 15 MIN: HCPCS

## 2017-04-21 PROCEDURE — 3331090001 HH PPS REVENUE CREDIT

## 2017-04-21 PROCEDURE — 400013 HH SOC

## 2017-04-21 RX ORDER — CIPROFLOXACIN 500 MG/1
500 TABLET ORAL 2 TIMES DAILY
Qty: 14 TAB | Refills: 0 | Status: SHIPPED | OUTPATIENT
Start: 2017-04-21 | End: 2017-04-28

## 2017-04-22 PROCEDURE — 3331090002 HH PPS REVENUE DEBIT

## 2017-04-22 PROCEDURE — 3331090001 HH PPS REVENUE CREDIT

## 2017-04-23 PROCEDURE — 3331090002 HH PPS REVENUE DEBIT

## 2017-04-23 PROCEDURE — 3331090001 HH PPS REVENUE CREDIT

## 2017-04-24 ENCOUNTER — HOME CARE VISIT (OUTPATIENT)
Dept: SCHEDULING | Facility: HOME HEALTH | Age: 82
End: 2017-04-24
Payer: MEDICARE

## 2017-04-24 VITALS
DIASTOLIC BLOOD PRESSURE: 64 MMHG | SYSTOLIC BLOOD PRESSURE: 120 MMHG | HEART RATE: 72 BPM | RESPIRATION RATE: 18 BRPM | TEMPERATURE: 98.6 F | OXYGEN SATURATION: 98 %

## 2017-04-24 VITALS
OXYGEN SATURATION: 98 % | TEMPERATURE: 99.1 F | DIASTOLIC BLOOD PRESSURE: 60 MMHG | SYSTOLIC BLOOD PRESSURE: 128 MMHG | HEART RATE: 82 BPM | RESPIRATION RATE: 14 BRPM

## 2017-04-24 PROCEDURE — 3331090002 HH PPS REVENUE DEBIT

## 2017-04-24 PROCEDURE — 3331090001 HH PPS REVENUE CREDIT

## 2017-04-24 PROCEDURE — G0151 HHCP-SERV OF PT,EA 15 MIN: HCPCS

## 2017-04-24 PROCEDURE — G0156 HHCP-SVS OF AIDE,EA 15 MIN: HCPCS

## 2017-04-25 ENCOUNTER — HOME CARE VISIT (OUTPATIENT)
Dept: HOME HEALTH SERVICES | Facility: HOME HEALTH | Age: 82
End: 2017-04-25
Payer: MEDICARE

## 2017-04-25 PROCEDURE — 3331090001 HH PPS REVENUE CREDIT

## 2017-04-25 PROCEDURE — 3331090002 HH PPS REVENUE DEBIT

## 2017-04-26 ENCOUNTER — HOME CARE VISIT (OUTPATIENT)
Dept: SCHEDULING | Facility: HOME HEALTH | Age: 82
End: 2017-04-26
Payer: MEDICARE

## 2017-04-26 VITALS
SYSTOLIC BLOOD PRESSURE: 146 MMHG | RESPIRATION RATE: 14 BRPM | TEMPERATURE: 99.5 F | OXYGEN SATURATION: 99 % | HEART RATE: 78 BPM | DIASTOLIC BLOOD PRESSURE: 70 MMHG

## 2017-04-26 VITALS
TEMPERATURE: 99.5 F | OXYGEN SATURATION: 99 % | SYSTOLIC BLOOD PRESSURE: 146 MMHG | RESPIRATION RATE: 14 BRPM | DIASTOLIC BLOOD PRESSURE: 70 MMHG | HEART RATE: 78 BPM

## 2017-04-26 PROCEDURE — 3331090001 HH PPS REVENUE CREDIT

## 2017-04-26 PROCEDURE — G0156 HHCP-SVS OF AIDE,EA 15 MIN: HCPCS

## 2017-04-26 PROCEDURE — 3331090002 HH PPS REVENUE DEBIT

## 2017-04-26 PROCEDURE — G0151 HHCP-SERV OF PT,EA 15 MIN: HCPCS

## 2017-04-26 PROCEDURE — G0152 HHCP-SERV OF OT,EA 15 MIN: HCPCS

## 2017-04-27 ENCOUNTER — HOME CARE VISIT (OUTPATIENT)
Dept: SCHEDULING | Facility: HOME HEALTH | Age: 82
End: 2017-04-27
Payer: MEDICARE

## 2017-04-27 ENCOUNTER — HOME CARE VISIT (OUTPATIENT)
Dept: HOME HEALTH SERVICES | Facility: HOME HEALTH | Age: 82
End: 2017-04-27
Payer: MEDICARE

## 2017-04-27 VITALS
HEART RATE: 77 BPM | OXYGEN SATURATION: 96 % | DIASTOLIC BLOOD PRESSURE: 60 MMHG | TEMPERATURE: 99.3 F | SYSTOLIC BLOOD PRESSURE: 128 MMHG

## 2017-04-27 PROCEDURE — G0155 HHCP-SVS OF CSW,EA 15 MIN: HCPCS

## 2017-04-27 PROCEDURE — 3331090001 HH PPS REVENUE CREDIT

## 2017-04-27 PROCEDURE — 3331090002 HH PPS REVENUE DEBIT

## 2017-04-27 PROCEDURE — G0151 HHCP-SERV OF PT,EA 15 MIN: HCPCS

## 2017-04-28 ENCOUNTER — HOME CARE VISIT (OUTPATIENT)
Dept: SCHEDULING | Facility: HOME HEALTH | Age: 82
End: 2017-04-28
Payer: MEDICARE

## 2017-04-28 ENCOUNTER — HOME CARE VISIT (OUTPATIENT)
Dept: HOME HEALTH SERVICES | Facility: HOME HEALTH | Age: 82
End: 2017-04-28
Payer: MEDICARE

## 2017-04-28 VITALS
TEMPERATURE: 100.2 F | HEART RATE: 81 BPM | RESPIRATION RATE: 14 BRPM | OXYGEN SATURATION: 98 % | SYSTOLIC BLOOD PRESSURE: 126 MMHG | DIASTOLIC BLOOD PRESSURE: 72 MMHG

## 2017-04-28 VITALS
TEMPERATURE: 99.2 F | HEART RATE: 85 BPM | RESPIRATION RATE: 18 BRPM | SYSTOLIC BLOOD PRESSURE: 122 MMHG | DIASTOLIC BLOOD PRESSURE: 80 MMHG | OXYGEN SATURATION: 98 %

## 2017-04-28 PROCEDURE — G0156 HHCP-SVS OF AIDE,EA 15 MIN: HCPCS

## 2017-04-28 PROCEDURE — 3331090001 HH PPS REVENUE CREDIT

## 2017-04-28 PROCEDURE — G0152 HHCP-SERV OF OT,EA 15 MIN: HCPCS

## 2017-04-28 PROCEDURE — 3331090002 HH PPS REVENUE DEBIT

## 2017-04-28 PROCEDURE — G0300 HHS/HOSPICE OF LPN EA 15 MIN: HCPCS

## 2017-04-29 PROCEDURE — 3331090001 HH PPS REVENUE CREDIT

## 2017-04-29 PROCEDURE — 3331090002 HH PPS REVENUE DEBIT

## 2017-04-30 PROCEDURE — 3331090002 HH PPS REVENUE DEBIT

## 2017-04-30 PROCEDURE — 3331090001 HH PPS REVENUE CREDIT

## 2017-05-01 ENCOUNTER — HOME CARE VISIT (OUTPATIENT)
Dept: SCHEDULING | Facility: HOME HEALTH | Age: 82
End: 2017-05-01
Payer: MEDICARE

## 2017-05-01 VITALS
DIASTOLIC BLOOD PRESSURE: 68 MMHG | OXYGEN SATURATION: 96 % | HEART RATE: 89 BPM | SYSTOLIC BLOOD PRESSURE: 128 MMHG | TEMPERATURE: 99.9 F

## 2017-05-01 PROCEDURE — 3331090001 HH PPS REVENUE CREDIT

## 2017-05-01 PROCEDURE — G0151 HHCP-SERV OF PT,EA 15 MIN: HCPCS

## 2017-05-01 PROCEDURE — G0156 HHCP-SVS OF AIDE,EA 15 MIN: HCPCS

## 2017-05-01 PROCEDURE — 3331090002 HH PPS REVENUE DEBIT

## 2017-05-02 ENCOUNTER — HOME CARE VISIT (OUTPATIENT)
Dept: SCHEDULING | Facility: HOME HEALTH | Age: 82
End: 2017-05-02
Payer: MEDICARE

## 2017-05-02 ENCOUNTER — HOME CARE VISIT (OUTPATIENT)
Dept: HOME HEALTH SERVICES | Facility: HOME HEALTH | Age: 82
End: 2017-05-02
Payer: MEDICARE

## 2017-05-02 VITALS
HEART RATE: 80 BPM | OXYGEN SATURATION: 99 % | DIASTOLIC BLOOD PRESSURE: 60 MMHG | TEMPERATURE: 98.8 F | SYSTOLIC BLOOD PRESSURE: 120 MMHG | RESPIRATION RATE: 14 BRPM

## 2017-05-02 PROCEDURE — G0300 HHS/HOSPICE OF LPN EA 15 MIN: HCPCS

## 2017-05-02 PROCEDURE — 3331090001 HH PPS REVENUE CREDIT

## 2017-05-02 PROCEDURE — G0152 HHCP-SERV OF OT,EA 15 MIN: HCPCS

## 2017-05-02 PROCEDURE — 3331090002 HH PPS REVENUE DEBIT

## 2017-05-03 ENCOUNTER — HOME CARE VISIT (OUTPATIENT)
Dept: SCHEDULING | Facility: HOME HEALTH | Age: 82
End: 2017-05-03
Payer: MEDICARE

## 2017-05-03 VITALS
SYSTOLIC BLOOD PRESSURE: 120 MMHG | DIASTOLIC BLOOD PRESSURE: 68 MMHG | TEMPERATURE: 98.9 F | RESPIRATION RATE: 16 BRPM | OXYGEN SATURATION: 98 % | HEART RATE: 88 BPM

## 2017-05-03 PROCEDURE — G0156 HHCP-SVS OF AIDE,EA 15 MIN: HCPCS

## 2017-05-03 PROCEDURE — 3331090001 HH PPS REVENUE CREDIT

## 2017-05-03 PROCEDURE — 3331090002 HH PPS REVENUE DEBIT

## 2017-05-04 ENCOUNTER — HOME CARE VISIT (OUTPATIENT)
Dept: SCHEDULING | Facility: HOME HEALTH | Age: 82
End: 2017-05-04
Payer: MEDICARE

## 2017-05-04 VITALS
SYSTOLIC BLOOD PRESSURE: 144 MMHG | OXYGEN SATURATION: 96 % | TEMPERATURE: 98.8 F | DIASTOLIC BLOOD PRESSURE: 74 MMHG | HEART RATE: 90 BPM

## 2017-05-04 VITALS
SYSTOLIC BLOOD PRESSURE: 101 MMHG | TEMPERATURE: 97.2 F | HEART RATE: 100 BPM | DIASTOLIC BLOOD PRESSURE: 60 MMHG | OXYGEN SATURATION: 93 % | RESPIRATION RATE: 16 BRPM

## 2017-05-04 PROCEDURE — 3331090001 HH PPS REVENUE CREDIT

## 2017-05-04 PROCEDURE — 3331090002 HH PPS REVENUE DEBIT

## 2017-05-04 PROCEDURE — G0152 HHCP-SERV OF OT,EA 15 MIN: HCPCS

## 2017-05-04 PROCEDURE — G0151 HHCP-SERV OF PT,EA 15 MIN: HCPCS

## 2017-05-05 ENCOUNTER — HOME CARE VISIT (OUTPATIENT)
Dept: SCHEDULING | Facility: HOME HEALTH | Age: 82
End: 2017-05-05
Payer: MEDICARE

## 2017-05-05 PROCEDURE — G0299 HHS/HOSPICE OF RN EA 15 MIN: HCPCS

## 2017-05-05 PROCEDURE — G0156 HHCP-SVS OF AIDE,EA 15 MIN: HCPCS

## 2017-05-05 PROCEDURE — 3331090001 HH PPS REVENUE CREDIT

## 2017-05-05 PROCEDURE — 3331090002 HH PPS REVENUE DEBIT

## 2017-05-06 PROCEDURE — 3331090002 HH PPS REVENUE DEBIT

## 2017-05-06 PROCEDURE — 3331090001 HH PPS REVENUE CREDIT

## 2017-05-07 VITALS
TEMPERATURE: 98.8 F | DIASTOLIC BLOOD PRESSURE: 62 MMHG | RESPIRATION RATE: 22 BRPM | HEART RATE: 88 BPM | OXYGEN SATURATION: 97 % | SYSTOLIC BLOOD PRESSURE: 108 MMHG

## 2017-05-07 PROCEDURE — 3331090002 HH PPS REVENUE DEBIT

## 2017-05-07 PROCEDURE — 3331090001 HH PPS REVENUE CREDIT

## 2017-05-08 ENCOUNTER — HOME CARE VISIT (OUTPATIENT)
Dept: SCHEDULING | Facility: HOME HEALTH | Age: 82
End: 2017-05-08
Payer: MEDICARE

## 2017-05-08 VITALS
RESPIRATION RATE: 18 BRPM | HEART RATE: 72 BPM | OXYGEN SATURATION: 98 % | DIASTOLIC BLOOD PRESSURE: 62 MMHG | SYSTOLIC BLOOD PRESSURE: 122 MMHG | TEMPERATURE: 97.4 F

## 2017-05-08 VITALS
HEART RATE: 74 BPM | DIASTOLIC BLOOD PRESSURE: 64 MMHG | TEMPERATURE: 99.2 F | OXYGEN SATURATION: 92 % | SYSTOLIC BLOOD PRESSURE: 138 MMHG | RESPIRATION RATE: 16 BRPM

## 2017-05-08 VITALS
HEART RATE: 70 BPM | DIASTOLIC BLOOD PRESSURE: 63 MMHG | OXYGEN SATURATION: 99 % | SYSTOLIC BLOOD PRESSURE: 96 MMHG | TEMPERATURE: 98.9 F

## 2017-05-08 PROCEDURE — 3331090001 HH PPS REVENUE CREDIT

## 2017-05-08 PROCEDURE — G0299 HHS/HOSPICE OF RN EA 15 MIN: HCPCS

## 2017-05-08 PROCEDURE — G0156 HHCP-SVS OF AIDE,EA 15 MIN: HCPCS

## 2017-05-08 PROCEDURE — G0152 HHCP-SERV OF OT,EA 15 MIN: HCPCS

## 2017-05-08 PROCEDURE — G0151 HHCP-SERV OF PT,EA 15 MIN: HCPCS

## 2017-05-08 PROCEDURE — 3331090002 HH PPS REVENUE DEBIT

## 2017-05-09 PROCEDURE — 3331090002 HH PPS REVENUE DEBIT

## 2017-05-09 PROCEDURE — 3331090001 HH PPS REVENUE CREDIT

## 2017-05-10 ENCOUNTER — HOME CARE VISIT (OUTPATIENT)
Dept: SCHEDULING | Facility: HOME HEALTH | Age: 82
End: 2017-05-10
Payer: MEDICARE

## 2017-05-10 PROCEDURE — G0156 HHCP-SVS OF AIDE,EA 15 MIN: HCPCS

## 2017-05-10 PROCEDURE — 3331090002 HH PPS REVENUE DEBIT

## 2017-05-10 PROCEDURE — 3331090001 HH PPS REVENUE CREDIT

## 2017-05-11 ENCOUNTER — HOME CARE VISIT (OUTPATIENT)
Dept: SCHEDULING | Facility: HOME HEALTH | Age: 82
End: 2017-05-11
Payer: MEDICARE

## 2017-05-11 VITALS
RESPIRATION RATE: 16 BRPM | TEMPERATURE: 99.2 F | DIASTOLIC BLOOD PRESSURE: 58 MMHG | OXYGEN SATURATION: 98 % | SYSTOLIC BLOOD PRESSURE: 128 MMHG | HEART RATE: 85 BPM

## 2017-05-11 VITALS — HEART RATE: 82 BPM | TEMPERATURE: 98.4 F | OXYGEN SATURATION: 95 %

## 2017-05-11 PROCEDURE — G0151 HHCP-SERV OF PT,EA 15 MIN: HCPCS

## 2017-05-11 PROCEDURE — 3331090001 HH PPS REVENUE CREDIT

## 2017-05-11 PROCEDURE — 3331090002 HH PPS REVENUE DEBIT

## 2017-05-11 PROCEDURE — G0152 HHCP-SERV OF OT,EA 15 MIN: HCPCS

## 2017-05-12 PROCEDURE — 3331090002 HH PPS REVENUE DEBIT

## 2017-05-12 PROCEDURE — 3331090001 HH PPS REVENUE CREDIT

## 2017-05-13 PROCEDURE — 3331090002 HH PPS REVENUE DEBIT

## 2017-05-13 PROCEDURE — 3331090001 HH PPS REVENUE CREDIT

## 2017-05-14 PROCEDURE — 3331090002 HH PPS REVENUE DEBIT

## 2017-05-14 PROCEDURE — 3331090001 HH PPS REVENUE CREDIT

## 2017-05-15 ENCOUNTER — HOME CARE VISIT (OUTPATIENT)
Dept: SCHEDULING | Facility: HOME HEALTH | Age: 82
End: 2017-05-15
Payer: MEDICARE

## 2017-05-15 VITALS
RESPIRATION RATE: 16 BRPM | DIASTOLIC BLOOD PRESSURE: 76 MMHG | OXYGEN SATURATION: 96 % | HEART RATE: 84 BPM | SYSTOLIC BLOOD PRESSURE: 120 MMHG | TEMPERATURE: 98.4 F

## 2017-05-15 PROCEDURE — 3331090001 HH PPS REVENUE CREDIT

## 2017-05-15 PROCEDURE — 3331090002 HH PPS REVENUE DEBIT

## 2017-05-15 PROCEDURE — G0151 HHCP-SERV OF PT,EA 15 MIN: HCPCS

## 2017-05-16 ENCOUNTER — HOME CARE VISIT (OUTPATIENT)
Dept: HOME HEALTH SERVICES | Facility: HOME HEALTH | Age: 82
End: 2017-05-16
Payer: MEDICARE

## 2017-05-16 PROCEDURE — 3331090001 HH PPS REVENUE CREDIT

## 2017-05-16 PROCEDURE — 3331090002 HH PPS REVENUE DEBIT

## 2017-05-17 ENCOUNTER — HOME CARE VISIT (OUTPATIENT)
Dept: SCHEDULING | Facility: HOME HEALTH | Age: 82
End: 2017-05-17
Payer: MEDICARE

## 2017-05-17 VITALS
OXYGEN SATURATION: 99 % | SYSTOLIC BLOOD PRESSURE: 140 MMHG | TEMPERATURE: 99.1 F | HEART RATE: 81 BPM | RESPIRATION RATE: 18 BRPM | DIASTOLIC BLOOD PRESSURE: 62 MMHG

## 2017-05-17 PROCEDURE — 3331090002 HH PPS REVENUE DEBIT

## 2017-05-17 PROCEDURE — 3331090001 HH PPS REVENUE CREDIT

## 2017-05-17 PROCEDURE — G0299 HHS/HOSPICE OF RN EA 15 MIN: HCPCS

## 2017-05-18 ENCOUNTER — HOME CARE VISIT (OUTPATIENT)
Dept: SCHEDULING | Facility: HOME HEALTH | Age: 82
End: 2017-05-18
Payer: MEDICARE

## 2017-05-18 VITALS
TEMPERATURE: 99.3 F | RESPIRATION RATE: 16 BRPM | DIASTOLIC BLOOD PRESSURE: 74 MMHG | SYSTOLIC BLOOD PRESSURE: 128 MMHG | HEART RATE: 103 BPM | OXYGEN SATURATION: 90 %

## 2017-05-18 PROCEDURE — G0299 HHS/HOSPICE OF RN EA 15 MIN: HCPCS

## 2017-05-18 PROCEDURE — G0151 HHCP-SERV OF PT,EA 15 MIN: HCPCS

## 2017-05-18 PROCEDURE — 3331090002 HH PPS REVENUE DEBIT

## 2017-05-18 PROCEDURE — 3331090001 HH PPS REVENUE CREDIT

## 2017-05-19 VITALS
OXYGEN SATURATION: 99 % | RESPIRATION RATE: 18 BRPM | HEART RATE: 103 BPM | TEMPERATURE: 99.8 F | DIASTOLIC BLOOD PRESSURE: 60 MMHG | SYSTOLIC BLOOD PRESSURE: 112 MMHG

## 2017-05-19 PROCEDURE — 3331090001 HH PPS REVENUE CREDIT

## 2017-05-19 PROCEDURE — 3331090002 HH PPS REVENUE DEBIT

## 2017-05-20 PROCEDURE — 3331090002 HH PPS REVENUE DEBIT

## 2017-05-20 PROCEDURE — 3331090001 HH PPS REVENUE CREDIT

## 2017-05-21 PROCEDURE — 3331090001 HH PPS REVENUE CREDIT

## 2017-05-21 PROCEDURE — 3331090002 HH PPS REVENUE DEBIT

## 2017-05-22 PROCEDURE — 3331090001 HH PPS REVENUE CREDIT

## 2017-05-22 PROCEDURE — 3331090002 HH PPS REVENUE DEBIT

## 2017-05-23 PROCEDURE — 3331090001 HH PPS REVENUE CREDIT

## 2017-05-23 PROCEDURE — 3331090002 HH PPS REVENUE DEBIT

## 2017-05-24 ENCOUNTER — HOME CARE VISIT (OUTPATIENT)
Dept: SCHEDULING | Facility: HOME HEALTH | Age: 82
End: 2017-05-24
Payer: MEDICARE

## 2017-05-24 PROCEDURE — G0299 HHS/HOSPICE OF RN EA 15 MIN: HCPCS

## 2017-05-24 PROCEDURE — 3331090001 HH PPS REVENUE CREDIT

## 2017-05-24 PROCEDURE — 3331090002 HH PPS REVENUE DEBIT

## 2017-05-25 VITALS
HEART RATE: 106 BPM | TEMPERATURE: 99.1 F | OXYGEN SATURATION: 97 % | DIASTOLIC BLOOD PRESSURE: 70 MMHG | SYSTOLIC BLOOD PRESSURE: 130 MMHG | RESPIRATION RATE: 18 BRPM

## 2017-05-25 PROCEDURE — 3331090001 HH PPS REVENUE CREDIT

## 2017-05-25 PROCEDURE — 3331090002 HH PPS REVENUE DEBIT

## 2017-05-26 PROCEDURE — 3331090002 HH PPS REVENUE DEBIT

## 2017-05-26 PROCEDURE — 3331090001 HH PPS REVENUE CREDIT

## 2017-05-27 PROCEDURE — 3331090002 HH PPS REVENUE DEBIT

## 2017-05-27 PROCEDURE — 3331090001 HH PPS REVENUE CREDIT

## 2017-05-28 PROCEDURE — 3331090001 HH PPS REVENUE CREDIT

## 2017-05-28 PROCEDURE — 3331090002 HH PPS REVENUE DEBIT

## 2017-05-29 PROCEDURE — 3331090002 HH PPS REVENUE DEBIT

## 2017-05-29 PROCEDURE — 3331090001 HH PPS REVENUE CREDIT

## 2017-05-30 PROCEDURE — 3331090001 HH PPS REVENUE CREDIT

## 2017-05-30 PROCEDURE — 3331090002 HH PPS REVENUE DEBIT

## 2017-05-31 PROCEDURE — 3331090001 HH PPS REVENUE CREDIT

## 2017-05-31 PROCEDURE — 3331090002 HH PPS REVENUE DEBIT

## 2017-06-01 PROCEDURE — 3331090001 HH PPS REVENUE CREDIT

## 2017-06-01 PROCEDURE — 3331090002 HH PPS REVENUE DEBIT

## 2017-06-02 PROCEDURE — 3331090001 HH PPS REVENUE CREDIT

## 2017-06-02 PROCEDURE — 3331090002 HH PPS REVENUE DEBIT

## 2017-06-03 PROCEDURE — 3331090001 HH PPS REVENUE CREDIT

## 2017-06-03 PROCEDURE — 3331090002 HH PPS REVENUE DEBIT

## 2017-06-04 PROCEDURE — 3331090001 HH PPS REVENUE CREDIT

## 2017-06-04 PROCEDURE — 3331090002 HH PPS REVENUE DEBIT

## 2017-06-05 PROCEDURE — 3331090002 HH PPS REVENUE DEBIT

## 2017-06-05 PROCEDURE — 3331090001 HH PPS REVENUE CREDIT

## 2017-06-06 PROCEDURE — 3331090002 HH PPS REVENUE DEBIT

## 2017-06-06 PROCEDURE — 3331090001 HH PPS REVENUE CREDIT

## 2017-06-07 PROCEDURE — 3331090001 HH PPS REVENUE CREDIT

## 2017-06-07 PROCEDURE — 3331090002 HH PPS REVENUE DEBIT

## 2017-06-08 PROCEDURE — 3331090001 HH PPS REVENUE CREDIT

## 2017-06-08 PROCEDURE — 3331090002 HH PPS REVENUE DEBIT

## 2017-06-09 PROCEDURE — 3331090001 HH PPS REVENUE CREDIT

## 2017-06-09 PROCEDURE — 3331090002 HH PPS REVENUE DEBIT

## 2017-06-10 PROCEDURE — 3331090002 HH PPS REVENUE DEBIT

## 2017-06-10 PROCEDURE — 3331090001 HH PPS REVENUE CREDIT

## 2017-06-11 PROCEDURE — 3331090001 HH PPS REVENUE CREDIT

## 2017-06-11 PROCEDURE — 3331090002 HH PPS REVENUE DEBIT

## 2017-06-12 PROCEDURE — 3331090001 HH PPS REVENUE CREDIT

## 2017-06-12 PROCEDURE — 3331090002 HH PPS REVENUE DEBIT

## 2017-06-13 PROCEDURE — 3331090002 HH PPS REVENUE DEBIT

## 2017-06-13 PROCEDURE — 3331090001 HH PPS REVENUE CREDIT

## 2017-06-14 ENCOUNTER — HOME CARE VISIT (OUTPATIENT)
Dept: SCHEDULING | Facility: HOME HEALTH | Age: 82
End: 2017-06-14
Payer: MEDICARE

## 2017-06-14 VITALS
TEMPERATURE: 98.2 F | HEART RATE: 82 BPM | SYSTOLIC BLOOD PRESSURE: 120 MMHG | OXYGEN SATURATION: 95 % | RESPIRATION RATE: 18 BRPM | DIASTOLIC BLOOD PRESSURE: 70 MMHG

## 2017-06-14 PROCEDURE — 3331090002 HH PPS REVENUE DEBIT

## 2017-06-14 PROCEDURE — 3331090001 HH PPS REVENUE CREDIT

## 2017-06-14 PROCEDURE — G0299 HHS/HOSPICE OF RN EA 15 MIN: HCPCS

## 2017-06-14 NOTE — PROGRESS NOTES
[facility] [place] - INPATIENT  Face to Face Encounter    Patients Name: Mary Carmen Bhat    YOB: 1934    Primary Diagnosis: urinary tract infection with indwelling gramajo cath    Date of Face to Face:   04/18/2017                                    Face to Face Encounter findings are related to primary reason for home care:   yes    1. I certify that the patient needs intermittent skilled nursing care, physical therapy and/or speech therapy. I will not be following this patient in the Community and Dr. Lexie Beyer will be responsible for signing the [address] 2. Initial Orders for Care: Must be completed only if Face to Face MD will not be signing the [address] Skilled Nursing,     3. I certify that this patient is homebound for the following reason(s): Requires the assistance of 1 or more persons to leave the home     4. I certify that this patient is under my care and that I, or a nurse practitioner or [phone] working with me, had a Face-to-Face Encounter that meets the physician Face-to-Face Encounter requirements.   Document the physical findings from the Face-to-Face Encounter that support the need for skilled services: Needs skilled safety assessment and interventions     Antoni Kauffman NP  6/14/2017

## 2017-06-15 PROCEDURE — 3331090002 HH PPS REVENUE DEBIT

## 2017-06-15 PROCEDURE — 3331090001 HH PPS REVENUE CREDIT

## 2017-06-16 PROCEDURE — 3331090002 HH PPS REVENUE DEBIT

## 2017-06-16 PROCEDURE — 3331090001 HH PPS REVENUE CREDIT

## 2017-06-17 PROCEDURE — 3331090001 HH PPS REVENUE CREDIT

## 2017-06-17 PROCEDURE — 3331090002 HH PPS REVENUE DEBIT

## 2017-06-18 PROCEDURE — 3331090001 HH PPS REVENUE CREDIT

## 2017-06-18 PROCEDURE — 3331090002 HH PPS REVENUE DEBIT

## 2017-06-19 ENCOUNTER — HOME CARE VISIT (OUTPATIENT)
Dept: SCHEDULING | Facility: HOME HEALTH | Age: 82
End: 2017-06-19
Payer: MEDICARE

## 2017-06-19 VITALS
RESPIRATION RATE: 18 BRPM | HEART RATE: 80 BPM | TEMPERATURE: 99.1 F | DIASTOLIC BLOOD PRESSURE: 60 MMHG | OXYGEN SATURATION: 92 % | SYSTOLIC BLOOD PRESSURE: 120 MMHG

## 2017-06-19 PROCEDURE — 3331090002 HH PPS REVENUE DEBIT

## 2017-06-19 PROCEDURE — G0299 HHS/HOSPICE OF RN EA 15 MIN: HCPCS

## 2017-06-19 PROCEDURE — 3331090001 HH PPS REVENUE CREDIT

## 2017-06-19 PROCEDURE — 3331090003 HH PPS REVENUE ADJ

## 2017-06-20 PROCEDURE — 3331090002 HH PPS REVENUE DEBIT

## 2017-06-20 PROCEDURE — 3331090001 HH PPS REVENUE CREDIT

## 2017-06-21 PROCEDURE — 3331090002 HH PPS REVENUE DEBIT

## 2017-06-21 PROCEDURE — 3331090001 HH PPS REVENUE CREDIT

## 2017-06-22 PROCEDURE — 3331090001 HH PPS REVENUE CREDIT

## 2017-06-22 PROCEDURE — 3331090002 HH PPS REVENUE DEBIT

## 2017-06-23 PROCEDURE — 3331090001 HH PPS REVENUE CREDIT

## 2017-06-23 PROCEDURE — 3331090002 HH PPS REVENUE DEBIT

## 2017-06-24 PROCEDURE — 3331090002 HH PPS REVENUE DEBIT

## 2017-06-24 PROCEDURE — 3331090001 HH PPS REVENUE CREDIT

## 2017-06-25 PROCEDURE — 3331090002 HH PPS REVENUE DEBIT

## 2017-06-25 PROCEDURE — 3331090001 HH PPS REVENUE CREDIT

## 2017-06-26 PROCEDURE — 3331090001 HH PPS REVENUE CREDIT

## 2017-06-26 PROCEDURE — 3331090002 HH PPS REVENUE DEBIT

## 2017-06-27 PROCEDURE — 3331090001 HH PPS REVENUE CREDIT

## 2017-06-27 PROCEDURE — 3331090002 HH PPS REVENUE DEBIT

## 2017-06-28 PROCEDURE — 3331090002 HH PPS REVENUE DEBIT

## 2017-06-28 PROCEDURE — 3331090001 HH PPS REVENUE CREDIT

## 2017-06-29 PROCEDURE — 3331090002 HH PPS REVENUE DEBIT

## 2017-06-29 PROCEDURE — 3331090001 HH PPS REVENUE CREDIT

## 2017-06-30 ENCOUNTER — HOME CARE VISIT (OUTPATIENT)
Dept: HOME HEALTH SERVICES | Facility: HOME HEALTH | Age: 82
End: 2017-06-30
Payer: MEDICARE

## 2017-06-30 PROCEDURE — 3331090002 HH PPS REVENUE DEBIT

## 2017-06-30 PROCEDURE — 3331090001 HH PPS REVENUE CREDIT

## 2017-07-01 PROCEDURE — 3331090001 HH PPS REVENUE CREDIT

## 2017-07-01 PROCEDURE — 3331090002 HH PPS REVENUE DEBIT

## 2017-07-02 PROCEDURE — 3331090002 HH PPS REVENUE DEBIT

## 2017-07-02 PROCEDURE — 3331090001 HH PPS REVENUE CREDIT

## 2017-07-03 PROCEDURE — 3331090002 HH PPS REVENUE DEBIT

## 2017-07-03 PROCEDURE — 3331090001 HH PPS REVENUE CREDIT

## 2017-07-04 PROCEDURE — 3331090001 HH PPS REVENUE CREDIT

## 2017-07-04 PROCEDURE — 3331090002 HH PPS REVENUE DEBIT

## 2017-07-05 PROCEDURE — 3331090001 HH PPS REVENUE CREDIT

## 2017-07-05 PROCEDURE — 3331090002 HH PPS REVENUE DEBIT

## 2017-07-06 ENCOUNTER — HOME CARE VISIT (OUTPATIENT)
Dept: SCHEDULING | Facility: HOME HEALTH | Age: 82
End: 2017-07-06
Payer: MEDICARE

## 2017-07-06 VITALS
SYSTOLIC BLOOD PRESSURE: 120 MMHG | HEART RATE: 74 BPM | DIASTOLIC BLOOD PRESSURE: 74 MMHG | RESPIRATION RATE: 18 BRPM | TEMPERATURE: 99.4 F | OXYGEN SATURATION: 93 %

## 2017-07-06 PROCEDURE — G0299 HHS/HOSPICE OF RN EA 15 MIN: HCPCS

## 2017-07-06 PROCEDURE — 3331090001 HH PPS REVENUE CREDIT

## 2017-07-06 PROCEDURE — 400014 HH F/U

## 2017-07-06 PROCEDURE — 3331090002 HH PPS REVENUE DEBIT

## 2017-07-07 PROCEDURE — 3331090002 HH PPS REVENUE DEBIT

## 2017-07-07 PROCEDURE — 3331090001 HH PPS REVENUE CREDIT

## 2017-07-08 PROCEDURE — 3331090001 HH PPS REVENUE CREDIT

## 2017-07-08 PROCEDURE — 3331090002 HH PPS REVENUE DEBIT

## 2017-07-09 PROCEDURE — 3331090002 HH PPS REVENUE DEBIT

## 2017-07-09 PROCEDURE — 3331090001 HH PPS REVENUE CREDIT

## 2017-07-10 PROCEDURE — 3331090001 HH PPS REVENUE CREDIT

## 2017-07-10 PROCEDURE — 3331090002 HH PPS REVENUE DEBIT

## 2017-07-11 PROCEDURE — 3331090002 HH PPS REVENUE DEBIT

## 2017-07-11 PROCEDURE — 3331090001 HH PPS REVENUE CREDIT

## 2017-07-12 PROCEDURE — 3331090001 HH PPS REVENUE CREDIT

## 2017-07-12 PROCEDURE — 3331090002 HH PPS REVENUE DEBIT

## 2017-07-13 PROCEDURE — 3331090001 HH PPS REVENUE CREDIT

## 2017-07-13 PROCEDURE — 3331090002 HH PPS REVENUE DEBIT

## 2017-07-14 ENCOUNTER — HOME CARE VISIT (OUTPATIENT)
Dept: SCHEDULING | Facility: HOME HEALTH | Age: 82
End: 2017-07-14
Payer: MEDICARE

## 2017-07-14 PROCEDURE — G0300 HHS/HOSPICE OF LPN EA 15 MIN: HCPCS

## 2017-07-14 PROCEDURE — 3331090002 HH PPS REVENUE DEBIT

## 2017-07-14 PROCEDURE — 3331090001 HH PPS REVENUE CREDIT

## 2017-07-15 PROCEDURE — 3331090002 HH PPS REVENUE DEBIT

## 2017-07-15 PROCEDURE — 3331090001 HH PPS REVENUE CREDIT

## 2017-07-16 PROCEDURE — 3331090001 HH PPS REVENUE CREDIT

## 2017-07-16 PROCEDURE — 3331090002 HH PPS REVENUE DEBIT

## 2017-07-17 PROCEDURE — 3331090002 HH PPS REVENUE DEBIT

## 2017-07-17 PROCEDURE — 3331090001 HH PPS REVENUE CREDIT

## 2017-07-18 PROCEDURE — 3331090001 HH PPS REVENUE CREDIT

## 2017-07-18 PROCEDURE — 3331090002 HH PPS REVENUE DEBIT

## 2017-07-19 PROCEDURE — 3331090001 HH PPS REVENUE CREDIT

## 2017-07-19 PROCEDURE — 3331090002 HH PPS REVENUE DEBIT

## 2017-07-20 PROCEDURE — 3331090002 HH PPS REVENUE DEBIT

## 2017-07-20 PROCEDURE — 3331090001 HH PPS REVENUE CREDIT

## 2017-07-21 PROCEDURE — 3331090002 HH PPS REVENUE DEBIT

## 2017-07-21 PROCEDURE — 3331090001 HH PPS REVENUE CREDIT

## 2017-07-22 PROCEDURE — 3331090002 HH PPS REVENUE DEBIT

## 2017-07-22 PROCEDURE — 3331090001 HH PPS REVENUE CREDIT

## 2017-07-23 PROCEDURE — 3331090001 HH PPS REVENUE CREDIT

## 2017-07-23 PROCEDURE — 3331090002 HH PPS REVENUE DEBIT

## 2017-07-24 PROCEDURE — 3331090002 HH PPS REVENUE DEBIT

## 2017-07-24 PROCEDURE — 3331090001 HH PPS REVENUE CREDIT

## 2017-07-25 PROCEDURE — 3331090001 HH PPS REVENUE CREDIT

## 2017-07-25 PROCEDURE — 3331090002 HH PPS REVENUE DEBIT

## 2017-07-26 PROCEDURE — 3331090001 HH PPS REVENUE CREDIT

## 2017-07-26 PROCEDURE — 3331090002 HH PPS REVENUE DEBIT

## 2017-07-27 ENCOUNTER — HOME CARE VISIT (OUTPATIENT)
Dept: SCHEDULING | Facility: HOME HEALTH | Age: 82
End: 2017-07-27
Payer: MEDICARE

## 2017-07-27 PROCEDURE — 3331090002 HH PPS REVENUE DEBIT

## 2017-07-27 PROCEDURE — 3331090001 HH PPS REVENUE CREDIT

## 2017-07-27 PROCEDURE — G0300 HHS/HOSPICE OF LPN EA 15 MIN: HCPCS

## 2017-07-28 PROCEDURE — 3331090001 HH PPS REVENUE CREDIT

## 2017-07-28 PROCEDURE — 3331090002 HH PPS REVENUE DEBIT

## 2017-07-29 PROCEDURE — 3331090001 HH PPS REVENUE CREDIT

## 2017-07-29 PROCEDURE — 3331090002 HH PPS REVENUE DEBIT

## 2017-07-30 VITALS
TEMPERATURE: 98.2 F | RESPIRATION RATE: 17 BRPM | HEART RATE: 77 BPM | SYSTOLIC BLOOD PRESSURE: 102 MMHG | OXYGEN SATURATION: 96 % | DIASTOLIC BLOOD PRESSURE: 68 MMHG

## 2017-07-30 PROCEDURE — 3331090001 HH PPS REVENUE CREDIT

## 2017-07-30 PROCEDURE — 3331090002 HH PPS REVENUE DEBIT

## 2017-07-31 PROCEDURE — 3331090002 HH PPS REVENUE DEBIT

## 2017-07-31 PROCEDURE — 3331090001 HH PPS REVENUE CREDIT

## 2017-08-01 PROCEDURE — 3331090001 HH PPS REVENUE CREDIT

## 2017-08-01 PROCEDURE — 3331090002 HH PPS REVENUE DEBIT

## 2017-08-02 PROCEDURE — 3331090002 HH PPS REVENUE DEBIT

## 2017-08-02 PROCEDURE — 3331090001 HH PPS REVENUE CREDIT

## 2017-08-03 PROCEDURE — 3331090002 HH PPS REVENUE DEBIT

## 2017-08-03 PROCEDURE — 3331090001 HH PPS REVENUE CREDIT

## 2017-08-04 PROCEDURE — 3331090002 HH PPS REVENUE DEBIT

## 2017-08-04 PROCEDURE — 3331090001 HH PPS REVENUE CREDIT

## 2017-08-05 PROCEDURE — 3331090002 HH PPS REVENUE DEBIT

## 2017-08-05 PROCEDURE — 3331090001 HH PPS REVENUE CREDIT

## 2017-08-06 PROCEDURE — 3331090001 HH PPS REVENUE CREDIT

## 2017-08-06 PROCEDURE — 3331090002 HH PPS REVENUE DEBIT

## 2017-08-07 ENCOUNTER — HOME CARE VISIT (OUTPATIENT)
Dept: SCHEDULING | Facility: HOME HEALTH | Age: 82
End: 2017-08-07
Payer: MEDICARE

## 2017-08-07 VITALS
TEMPERATURE: 99.1 F | OXYGEN SATURATION: 98 % | SYSTOLIC BLOOD PRESSURE: 116 MMHG | HEART RATE: 77 BPM | DIASTOLIC BLOOD PRESSURE: 66 MMHG

## 2017-08-07 PROCEDURE — 3331090001 HH PPS REVENUE CREDIT

## 2017-08-07 PROCEDURE — 3331090002 HH PPS REVENUE DEBIT

## 2017-08-07 PROCEDURE — G0299 HHS/HOSPICE OF RN EA 15 MIN: HCPCS

## 2017-08-08 PROCEDURE — 3331090002 HH PPS REVENUE DEBIT

## 2017-08-08 PROCEDURE — 3331090001 HH PPS REVENUE CREDIT

## 2017-08-09 PROCEDURE — 3331090001 HH PPS REVENUE CREDIT

## 2017-08-09 PROCEDURE — 3331090002 HH PPS REVENUE DEBIT

## 2017-08-10 PROCEDURE — 3331090002 HH PPS REVENUE DEBIT

## 2017-08-10 PROCEDURE — 3331090001 HH PPS REVENUE CREDIT

## 2017-08-11 PROCEDURE — 3331090001 HH PPS REVENUE CREDIT

## 2017-08-11 PROCEDURE — 3331090002 HH PPS REVENUE DEBIT

## 2017-08-12 PROCEDURE — 3331090001 HH PPS REVENUE CREDIT

## 2017-08-12 PROCEDURE — 3331090002 HH PPS REVENUE DEBIT

## 2017-08-13 ENCOUNTER — HOSPITAL ENCOUNTER (EMERGENCY)
Age: 82
Discharge: HOME | End: 2017-08-13
Attending: EMERGENCY MEDICINE
Payer: MEDICARE

## 2017-08-13 VITALS
DIASTOLIC BLOOD PRESSURE: 77 MMHG | SYSTOLIC BLOOD PRESSURE: 155 MMHG | WEIGHT: 88 LBS | RESPIRATION RATE: 16 BRPM | HEIGHT: 60 IN | OXYGEN SATURATION: 96 % | HEART RATE: 93 BPM | TEMPERATURE: 98.3 F | BODY MASS INDEX: 17.28 KG/M2

## 2017-08-13 DIAGNOSIS — E87.6 HYPOKALEMIA: Primary | ICD-10-CM

## 2017-08-13 DIAGNOSIS — T83.9XXA FOLEY CATHETER PROBLEM, INITIAL ENCOUNTER (HCC): ICD-10-CM

## 2017-08-13 LAB
ANION GAP BLD CALC-SCNC: 16 MMOL/L (ref 5–15)
APPEARANCE UR: ABNORMAL
BACTERIA URNS QL MICRO: ABNORMAL /HPF
BILIRUB UR QL: NEGATIVE
BUN BLD-MCNC: 12 MG/DL (ref 9–20)
CA-I BLD-MCNC: 1.13 MMOL/L (ref 1.12–1.32)
CHLORIDE BLD-SCNC: 99 MMOL/L (ref 98–107)
CO2 BLD-SCNC: 31 MMOL/L (ref 21–32)
COLOR UR: ABNORMAL
CREAT BLD-MCNC: 0.6 MG/DL (ref 0.6–1.3)
EPITH CASTS URNS QL MICRO: ABNORMAL /LPF
GLUCOSE BLD-MCNC: 108 MG/DL (ref 65–100)
GLUCOSE UR STRIP.AUTO-MCNC: NEGATIVE MG/DL
HCT VFR BLD CALC: 34 % (ref 35–47)
HGB BLD-MCNC: 11.6 GM/DL (ref 11.5–16)
HGB UR QL STRIP: ABNORMAL
KETONES UR QL STRIP.AUTO: NEGATIVE MG/DL
LEUKOCYTE ESTERASE UR QL STRIP.AUTO: ABNORMAL
NITRITE UR QL STRIP.AUTO: POSITIVE
PH UR STRIP: 7 [PH] (ref 5–8)
POTASSIUM BLD-SCNC: 2.7 MMOL/L (ref 3.5–5.1)
PROT UR STRIP-MCNC: NEGATIVE MG/DL
RBC #/AREA URNS HPF: ABNORMAL /HPF (ref 0–5)
SERVICE CMNT-IMP: ABNORMAL
SODIUM BLD-SCNC: 143 MMOL/L (ref 136–145)
SP GR UR REFRACTOMETRY: 1.01 (ref 1–1.03)
UR CULT HOLD, URHOLD: NORMAL
UROBILINOGEN UR QL STRIP.AUTO: 0.2 EU/DL (ref 0.2–1)
WBC URNS QL MICRO: ABNORMAL /HPF (ref 0–4)

## 2017-08-13 PROCEDURE — 99284 EMERGENCY DEPT VISIT MOD MDM: CPT

## 2017-08-13 PROCEDURE — 3331090001 HH PPS REVENUE CREDIT

## 2017-08-13 PROCEDURE — 81001 URINALYSIS AUTO W/SCOPE: CPT | Performed by: EMERGENCY MEDICINE

## 2017-08-13 PROCEDURE — 3331090002 HH PPS REVENUE DEBIT

## 2017-08-13 PROCEDURE — 87086 URINE CULTURE/COLONY COUNT: CPT | Performed by: EMERGENCY MEDICINE

## 2017-08-13 PROCEDURE — 87077 CULTURE AEROBIC IDENTIFY: CPT | Performed by: EMERGENCY MEDICINE

## 2017-08-13 PROCEDURE — 87186 SC STD MICRODIL/AGAR DIL: CPT | Performed by: EMERGENCY MEDICINE

## 2017-08-13 PROCEDURE — 80047 BASIC METABLC PNL IONIZED CA: CPT

## 2017-08-13 RX ORDER — POTASSIUM CHLORIDE 20 MEQ/1
20 TABLET, EXTENDED RELEASE ORAL 2 TIMES DAILY
Qty: 30 TAB | Refills: 0 | Status: SHIPPED | OUTPATIENT
Start: 2017-08-13 | End: 2017-08-28

## 2017-08-13 RX ORDER — POTASSIUM CHLORIDE 20 MEQ/1
20 TABLET, EXTENDED RELEASE ORAL 2 TIMES DAILY
Qty: 30 TAB | Refills: 0 | Status: SHIPPED | OUTPATIENT
Start: 2017-08-13 | End: 2017-08-13

## 2017-08-13 NOTE — PROGRESS NOTES
Care Management ED Assessment    **CM Consult - home needs**    Nathalia Amado is an 80 y.o. female who presents from Home via EMS with chief complaint of Dysuria. Patient presents to [facility] ED with an indwelling gramajo, she has been complaining of pelvic pain and dysuria. Pt states constant lower pelvic pain with no radiation described as \"cramping\". Pt usually has the catheter changed \"every 2 to 3 weeks\". However, the catheter has not been changed in \"about 4 weeks\". Verified face sheet and demographics. PCP: Radonna Cogan, MD     Care Management Interventions  PCP Verified by CM: Yes (PCP:  Dr. Rebeca Carvajal)  Mode of Transport at Discharge: BLS (RN setting up transportation)  Transition of Care Consult (CM Consult): Discharge Planning (Open to [facility] - INPATIENT )  [address] [place] [place]: No  Discharge Durable Medical Equipment: No  Health Maintenance Reviewed: Yes  Physical Therapy Consult: No  Occupational Therapy Consult: No  Speech Therapy Consult: No  Current Support Network: Lives with Caregiver (Lives with family, daughter at bedside. Daughter speaking [place] - she verifies patient has care givers in the home setting.  )  Plan discussed with Pt/Family/Caregiver: Yes (Met with patient and daughter, patient speaks no [place]. Daughter at bedside speaks [place], ther mom is open to [facility] - INPATIENT and has personal care in the home.  )  Discharge Location  Discharge Placement: Home with home health    Daughter at bedside and agreeable with discharging to home.     Blanca Maradiaga, RN, BSN, [facility] [place]  ED Care Management  [phone]

## 2017-08-13 NOTE — DISCHARGE INSTRUCTIONS
Hypokalemia: Care Instructions  Your Care Instructions  Hypokalemia (say \"as-ov-ddu-MAY-aury-uh\") is a low level of potassium. The heart, muscles, kidneys, and nervous system all need potassium to work well. This problem has many different causes. Kidney problems, diet, and medicines like diuretics and laxatives can cause it. So can vomiting or diarrhea. In some cases, cancer is the cause. Your doctor may do tests to find the cause of your low potassium levels. You may need medicines to bring your potassium levels back to normal. You may also need regular blood tests to check your potassium. If you have very low potassium, you may need intravenous (IV) medicines. You also may need tests to check the electrical activity of your heart. Heart problems caused by low potassium levels can be very serious. Follow-up care is a key part of your treatment and safety. Be sure to make and go to all appointments, and call your doctor if you are having problems. It's also a good idea to know your test results and keep a list of the medicines you take. How can you care for yourself at home? · If your doctor recommends it, eat foods that have a lot of potassium. These include fresh fruits, juices, and vegetables. They also include nuts, beans, and milk. · Be safe with medicines. If your doctor prescribes medicines or potassium supplements, take them exactly as directed. Call your doctor if you have any problems with your medicines. · Get your potassium levels tested as often as your doctor tells you. When should you call for help? Call 911 anytime you think you may need emergency care. For example, call if:  · You feel like your heart is missing beats. Heart problems caused by low potassium can cause death. · You passed out (lost consciousness). · You have a seizure. Call your doctor now or seek immediate medical care if:  · You feel weak or unusually tired. · You have severe arm or leg cramps.   · You have tingling or numbness. · You feel sick to your stomach, or you vomit. · You have belly cramps. · You feel bloated or constipated. · You have to urinate a lot. · You feel very thirsty most of the time. · You are dizzy or lightheaded, or you feel like you may faint. · You feel depressed, or you lose touch with reality. Watch closely for changes in your health, and be sure to contact your doctor if:  · You do not get better as expected. Where can you learn more? Go to http://jose-cheyanne.info/. Enter G358 in the search box to learn more about \"Hypokalemia: Care Instructions. \"  Current as of: July 28, 2016  Content Version: 11.3  © 6906-2423 [employer]. Care instructions adapted under license by [employer] (which disclaims liability or warranty for this information). If you have questions about a medical condition or this instruction, always ask your healthcare professional. [place] [address] any warranty or liability for your use of this information. We hope that we have addressed all of your medical concerns. The examination and treatment you received in the Emergency Department were for an emergent problem and were not intended as complete care. It is important that you follow up with your healthcare provider(s) for ongoing care. If your symptoms worsen or do not improve as expected, and you are unable to reach your usual health care provider(s), you should return to the Emergency Department. Today's healthcare is undergoing tremendous change, and patient satisfaction surveys are one of the many tools to assess the quality of medical care. You may receive a survey from the [employer] organization regarding your experience in the Emergency Department. I hope that your experience has been completely positive, particularly the medical care that I provided.   As such, please participate in the survey; anything less than excellent does not meet my expectations or intentions. [facility] and [email or web address] participate in nationally recognized quality of care measures. If your blood pressure is greater than 120/80, as reported below, we urge that you seek medical care to address the potential of high blood pressure, commonly known as hypertension. Hypertension can be hereditary or can be caused by certain medical conditions, pain, stress, or \"white coat syndrome. \"       Please make an appointment with your health care provider(s) for follow up of your Emergency Department visit. VITALS:   Patient Vitals for the past 8 hrs:   Temp Pulse Resp BP SpO2   08/13/17 0736 98.2 °F (36.8 °C) 87 16 133/67 98 %   08/13/17 0707 98.5 °F (36.9 °C) 91 18 141/74 99 %          Thank you for allowing us to provide you with medical care today. We realize that you have many choices for your emergency care needs. Please choose us in the future for any continued health care needs.       Lesly Vidal MD    [facility], [facility].   Office: [phone]            Recent Results (from the past 24 hour(s))   URINALYSIS W/MICROSCOPIC    Collection Time: 08/13/17  8:18 AM   Result Value Ref Range    Color YELLOW/STRAW      Appearance TURBID (A) CLEAR      Specific gravity 1.010 1.003 - 1.030      pH (UA) 7.0 5.0 - 8.0      Protein NEGATIVE  NEG mg/dL    Glucose NEGATIVE  NEG mg/dL    Ketone NEGATIVE  NEG mg/dL    Bilirubin NEGATIVE  NEG      Blood MODERATE (A) NEG      Urobilinogen 0.2 0.2 - 1.0 EU/dL    Nitrites POSITIVE (A) NEG      Leukocyte Esterase LARGE (A) NEG      WBC  0 - 4 /hpf    RBC 5-10 0 - 5 /hpf    Epithelial cells FEW FEW /lpf    Bacteria 3+ (A) NEG /hpf   URINE CULTURE HOLD SAMPLE    Collection Time: 08/13/17  8:18 AM   Result Value Ref Range    Urine culture hold URINE ON HOLD IN MICROBIOLOGY DEPT FOR 3 DAYS     POC CHEM8    Collection Time: 08/13/17 10:04 AM   Result Value Ref Range    Calcium, ionized (POC) 1.13 1.12 - 1.32 MMOL/L    Sodium (POC) 143 136 - 145 MMOL/L    Potassium (POC) 2.7 (LL) 3.5 - 5.1 MMOL/L    Chloride (POC) 99 98 - 107 MMOL/L    CO2 (POC) 31 21 - 32 MMOL/L    Anion gap (POC) 16 (H) 5 - 15 mmol/L    Glucose (POC) 108 (H) 65 - 100 MG/DL    BUN (POC) 12 9 - 20 MG/DL    Creatinine (POC) 0.6 0.6 - 1.3 MG/DL    GFRAA, POC >60 >60 ml/min/1.73m2    GFRNA, POC >60 >60 ml/min/1.73m2    Hemoglobin (POC) 11.6 11.5 - 16.0 GM/DL    Hematocrit (POC) 34 (L) 35.0 - 47.0 %    Comment Notified RN or MD immediately by          No results found.

## 2017-08-13 NOTE — ED PROVIDER NOTES
HPI Comments: 80 y.o. female with past medical history significant for Hypertension and Hypercholesterolemia who presents from Home via EMS with chief complaint of Dysuria. Patient presents to [facility] ED with an indwelling gramajo. Per patient's daughter, patient has recently been complaining of pelvic pain and dysuria. Pt states constant lower pelvic pain with no radiation described as \"cramping\". Pt usually has the catheter changed \"every 2 to 3 weeks\". However, the catheter has not been changed in \"about 4 weeks\". Pt's daughter states previous history of symptoms similar to those presented today during which the patient's gramajo was changed with relief of symptoms. Per chart review patient has had gramajo since at least February and has refused many follow up appts with urology to evaluate need for gramajo. Pt denies fever, chills, cough, congestion, SOB, chest pain, nausea, vomiting, or diarrhea. Pt denies any other acute medical complaints. There are no other acute medical concerns at this time. PCP: Rich Chapa MD    Note written by Cody Jose, as dictated by Karma Eli MD 7:37 AM    The history is provided by a relative and the patient. The history is limited by a language barrier. Past Medical History:   Diagnosis Date    Gastrointestinal disorder     GERD    Hypercholesterolemia     Hypertension     Infestation by bed bug     Intracerebral bleed (HCC)     Pneumonia, organism unspecified 5/10/2013    Vertigo     VRE (vancomycin resistant enterococcus) culture positive        Past Surgical History:   Procedure Laterality Date    NEUROLOGICAL PROCEDURE UNLISTED      brain surgery         History reviewed. No pertinent family history. Social History     Social History    Marital status: SINGLE     Spouse name: N/A    Number of children: N/A    Years of education: N/A     Occupational History    Not on file.      Social History Main Topics    Smoking status: Never Smoker    Smokeless tobacco: Not on file    Alcohol use No    Drug use: No    Sexual activity: Not on file     Other Topics Concern    Not on file     Social History Narrative         ALLERGIES: Aspirin    Review of Systems   Constitutional: Negative for chills and fever. HENT: Negative for congestion. Respiratory: Negative for cough and shortness of breath. Cardiovascular: Negative for chest pain. Gastrointestinal: Negative for diarrhea, nausea and vomiting. Genitourinary: Positive for dysuria and pelvic pain. All other systems reviewed and are negative. Vitals:    08/13/17 0707   BP: 141/74   Pulse: 91   Resp: 18   Temp: 98.5 °F (36.9 °C)   SpO2: 99%            Physical Exam   Constitutional: She appears well-developed. Elderly, frail   HENT:   Head: Normocephalic and atraumatic. Mouth/Throat: Oropharynx is clear and moist.   Eyes: EOM are normal. Pupils are equal, round, and reactive to light. Neck: Normal range of motion. Neck supple. Cardiovascular: Normal rate, regular rhythm, normal heart sounds and intact distal pulses. Exam reveals no gallop and no friction rub. No murmur heard. Pulmonary/Chest: Effort normal. No respiratory distress. She has no wheezes. She has no rales. Abdominal: Soft. There is no tenderness. There is no rebound. Genitourinary:   Genitourinary Comments: Chambers catheter in place   Musculoskeletal: Normal range of motion. She exhibits no tenderness. Neurological: She is alert. No cranial nerve deficit. Motor; symmetric   Skin: No erythema. Psychiatric: She has a normal mood and affect. Her behavior is normal.   Nursing note and vitals reviewed. Note written by Cody Jose, as dictated by Karma Eli MD 7:38 AM      [facility]  ED Course       Procedures    PROGRESS NOTE:8:00 AM  Attempted to use [employer] phone for , however no one answered.      PROGRESS NOTE:9:07 AM  Still unable to use language phone, however there is now a family member at bedside that is able to interrupt.  Patient complains of vague dizziness and vague abdominal pain, however the patient's abdomen continues to be soft and nontender on exam.

## 2017-08-13 NOTE — ED NOTES
Triage: Patient arrives via EMS from home with c/o dysuria. Pt has indwelling gramajo, patient and children unable to answer why patient has gramajo. Per chart review patient has had gramajo since at least February and has refused many follow up appts with urology to evaluate need for gramajo.

## 2017-08-13 NOTE — ED NOTES
Dr. Filemon Richardson at bedside. [address] [facility] speaking patient complains of urinary pain. Daughter reports that she gets gramajo changed each month. Gramajo changed by off-going nurse, Michael Angel RN. Gramajo draining 50cc yellow urine with sediment. Urine sent to lab.

## 2017-08-14 PROCEDURE — 3331090002 HH PPS REVENUE DEBIT

## 2017-08-14 PROCEDURE — 3331090001 HH PPS REVENUE CREDIT

## 2017-08-15 PROCEDURE — 3331090002 HH PPS REVENUE DEBIT

## 2017-08-15 PROCEDURE — 3331090001 HH PPS REVENUE CREDIT

## 2017-08-16 ENCOUNTER — HOME CARE VISIT (OUTPATIENT)
Dept: SCHEDULING | Facility: HOME HEALTH | Age: 82
End: 2017-08-16
Payer: MEDICARE

## 2017-08-16 VITALS
SYSTOLIC BLOOD PRESSURE: 130 MMHG | HEART RATE: 89 BPM | OXYGEN SATURATION: 99 % | TEMPERATURE: 99.2 F | DIASTOLIC BLOOD PRESSURE: 80 MMHG | RESPIRATION RATE: 18 BRPM

## 2017-08-16 PROCEDURE — 3331090001 HH PPS REVENUE CREDIT

## 2017-08-16 PROCEDURE — G0299 HHS/HOSPICE OF RN EA 15 MIN: HCPCS

## 2017-08-16 PROCEDURE — 3331090002 HH PPS REVENUE DEBIT

## 2017-08-17 PROCEDURE — 3331090002 HH PPS REVENUE DEBIT

## 2017-08-17 PROCEDURE — 3331090001 HH PPS REVENUE CREDIT

## 2017-08-18 PROCEDURE — 3331090001 HH PPS REVENUE CREDIT

## 2017-08-18 PROCEDURE — 3331090002 HH PPS REVENUE DEBIT

## 2017-08-19 PROCEDURE — 3331090002 HH PPS REVENUE DEBIT

## 2017-08-19 PROCEDURE — 3331090001 HH PPS REVENUE CREDIT

## 2017-08-20 PROCEDURE — 3331090001 HH PPS REVENUE CREDIT

## 2017-08-20 PROCEDURE — 3331090002 HH PPS REVENUE DEBIT

## 2017-08-21 PROCEDURE — 3331090001 HH PPS REVENUE CREDIT

## 2017-08-21 PROCEDURE — 3331090002 HH PPS REVENUE DEBIT

## 2017-08-22 PROCEDURE — 3331090002 HH PPS REVENUE DEBIT

## 2017-08-22 PROCEDURE — 3331090001 HH PPS REVENUE CREDIT

## 2017-08-23 PROCEDURE — 3331090001 HH PPS REVENUE CREDIT

## 2017-08-23 PROCEDURE — 3331090002 HH PPS REVENUE DEBIT

## 2017-08-24 PROCEDURE — 3331090002 HH PPS REVENUE DEBIT

## 2017-08-24 PROCEDURE — 3331090001 HH PPS REVENUE CREDIT

## 2017-08-25 PROCEDURE — 3331090001 HH PPS REVENUE CREDIT

## 2017-08-25 PROCEDURE — 3331090002 HH PPS REVENUE DEBIT

## 2017-08-26 PROCEDURE — 3331090001 HH PPS REVENUE CREDIT

## 2017-08-26 PROCEDURE — 3331090002 HH PPS REVENUE DEBIT

## 2017-08-27 PROCEDURE — 3331090002 HH PPS REVENUE DEBIT

## 2017-08-27 PROCEDURE — 3331090001 HH PPS REVENUE CREDIT

## 2017-08-28 PROCEDURE — 3331090002 HH PPS REVENUE DEBIT

## 2017-08-28 PROCEDURE — 3331090001 HH PPS REVENUE CREDIT

## 2017-08-29 PROCEDURE — 3331090001 HH PPS REVENUE CREDIT

## 2017-08-29 PROCEDURE — 3331090002 HH PPS REVENUE DEBIT

## 2017-08-30 ENCOUNTER — HOME CARE VISIT (OUTPATIENT)
Dept: SCHEDULING | Facility: HOME HEALTH | Age: 82
End: 2017-08-30
Payer: MEDICARE

## 2017-08-30 PROCEDURE — 400014 HH F/U

## 2017-08-30 PROCEDURE — 3331090002 HH PPS REVENUE DEBIT

## 2017-08-30 PROCEDURE — 3331090001 HH PPS REVENUE CREDIT

## 2017-08-30 PROCEDURE — G0300 HHS/HOSPICE OF LPN EA 15 MIN: HCPCS

## 2017-08-31 VITALS
HEART RATE: 94 BPM | SYSTOLIC BLOOD PRESSURE: 118 MMHG | OXYGEN SATURATION: 99 % | DIASTOLIC BLOOD PRESSURE: 62 MMHG | RESPIRATION RATE: 16 BRPM | TEMPERATURE: 98.7 F

## 2017-08-31 PROCEDURE — 3331090001 HH PPS REVENUE CREDIT

## 2017-08-31 PROCEDURE — 3331090002 HH PPS REVENUE DEBIT

## 2017-09-01 PROCEDURE — A4344 CATH INDW FOLEY 2 WAY SILICN: HCPCS

## 2017-09-01 PROCEDURE — A4354 CATH INSERTION TRAY W/BAG: HCPCS

## 2017-09-01 PROCEDURE — A4320 IRRIGATION TRAY: HCPCS

## 2017-09-01 PROCEDURE — 3331090001 HH PPS REVENUE CREDIT

## 2017-09-01 PROCEDURE — A4333 URINARY CATH ANCHOR DEVICE: HCPCS

## 2017-09-01 PROCEDURE — 3331090002 HH PPS REVENUE DEBIT

## 2017-09-01 PROCEDURE — A4357 BEDSIDE DRAINAGE BAG: HCPCS

## 2017-09-02 PROCEDURE — 3331090001 HH PPS REVENUE CREDIT

## 2017-09-02 PROCEDURE — 3331090002 HH PPS REVENUE DEBIT

## 2017-09-03 PROCEDURE — 3331090001 HH PPS REVENUE CREDIT

## 2017-09-03 PROCEDURE — 3331090002 HH PPS REVENUE DEBIT

## 2017-09-04 PROCEDURE — 3331090002 HH PPS REVENUE DEBIT

## 2017-09-04 PROCEDURE — 3331090001 HH PPS REVENUE CREDIT

## 2017-09-05 PROCEDURE — 3331090002 HH PPS REVENUE DEBIT

## 2017-09-05 PROCEDURE — 3331090001 HH PPS REVENUE CREDIT

## 2017-09-06 PROCEDURE — 3331090001 HH PPS REVENUE CREDIT

## 2017-09-06 PROCEDURE — 3331090002 HH PPS REVENUE DEBIT

## 2017-09-07 ENCOUNTER — HOME CARE VISIT (OUTPATIENT)
Dept: SCHEDULING | Facility: HOME HEALTH | Age: 82
End: 2017-09-07
Payer: MEDICARE

## 2017-09-07 VITALS
SYSTOLIC BLOOD PRESSURE: 130 MMHG | HEART RATE: 86 BPM | RESPIRATION RATE: 18 BRPM | TEMPERATURE: 99.2 F | DIASTOLIC BLOOD PRESSURE: 70 MMHG | OXYGEN SATURATION: 99 %

## 2017-09-07 PROCEDURE — G0299 HHS/HOSPICE OF RN EA 15 MIN: HCPCS

## 2017-09-07 PROCEDURE — 3331090002 HH PPS REVENUE DEBIT

## 2017-09-07 PROCEDURE — 3331090001 HH PPS REVENUE CREDIT

## 2017-09-08 PROCEDURE — 3331090001 HH PPS REVENUE CREDIT

## 2017-09-08 PROCEDURE — 3331090002 HH PPS REVENUE DEBIT

## 2017-09-09 PROCEDURE — 3331090001 HH PPS REVENUE CREDIT

## 2017-09-09 PROCEDURE — 3331090002 HH PPS REVENUE DEBIT

## 2017-09-10 PROCEDURE — 3331090002 HH PPS REVENUE DEBIT

## 2017-09-10 PROCEDURE — 3331090001 HH PPS REVENUE CREDIT

## 2017-09-11 PROCEDURE — 3331090002 HH PPS REVENUE DEBIT

## 2017-09-11 PROCEDURE — 3331090001 HH PPS REVENUE CREDIT

## 2017-09-12 PROCEDURE — 3331090001 HH PPS REVENUE CREDIT

## 2017-09-12 PROCEDURE — 3331090002 HH PPS REVENUE DEBIT

## 2017-09-13 ENCOUNTER — HOME CARE VISIT (OUTPATIENT)
Dept: SCHEDULING | Facility: HOME HEALTH | Age: 82
End: 2017-09-13
Payer: MEDICARE

## 2017-09-13 VITALS
SYSTOLIC BLOOD PRESSURE: 130 MMHG | TEMPERATURE: 99.3 F | HEART RATE: 105 BPM | DIASTOLIC BLOOD PRESSURE: 80 MMHG | RESPIRATION RATE: 18 BRPM | OXYGEN SATURATION: 98 %

## 2017-09-13 PROCEDURE — 3331090002 HH PPS REVENUE DEBIT

## 2017-09-13 PROCEDURE — G0299 HHS/HOSPICE OF RN EA 15 MIN: HCPCS

## 2017-09-13 PROCEDURE — 3331090001 HH PPS REVENUE CREDIT

## 2017-09-14 PROCEDURE — 3331090001 HH PPS REVENUE CREDIT

## 2017-09-14 PROCEDURE — 3331090002 HH PPS REVENUE DEBIT

## 2017-09-15 PROCEDURE — 3331090002 HH PPS REVENUE DEBIT

## 2017-09-15 PROCEDURE — 3331090001 HH PPS REVENUE CREDIT

## 2017-09-16 PROCEDURE — 3331090002 HH PPS REVENUE DEBIT

## 2017-09-16 PROCEDURE — 3331090001 HH PPS REVENUE CREDIT

## 2017-09-17 PROCEDURE — 3331090001 HH PPS REVENUE CREDIT

## 2017-09-17 PROCEDURE — 3331090002 HH PPS REVENUE DEBIT

## 2017-09-18 PROCEDURE — 3331090001 HH PPS REVENUE CREDIT

## 2017-09-18 PROCEDURE — 3331090002 HH PPS REVENUE DEBIT

## 2017-09-19 PROCEDURE — 3331090002 HH PPS REVENUE DEBIT

## 2017-09-19 PROCEDURE — 3331090001 HH PPS REVENUE CREDIT

## 2017-09-20 ENCOUNTER — HOME CARE VISIT (OUTPATIENT)
Dept: SCHEDULING | Facility: HOME HEALTH | Age: 82
End: 2017-09-20
Payer: MEDICARE

## 2017-09-20 PROCEDURE — 3331090001 HH PPS REVENUE CREDIT

## 2017-09-20 PROCEDURE — 3331090002 HH PPS REVENUE DEBIT

## 2017-09-20 PROCEDURE — G0299 HHS/HOSPICE OF RN EA 15 MIN: HCPCS

## 2017-09-21 VITALS
TEMPERATURE: 99.4 F | OXYGEN SATURATION: 98 % | HEART RATE: 76 BPM | SYSTOLIC BLOOD PRESSURE: 120 MMHG | DIASTOLIC BLOOD PRESSURE: 68 MMHG | RESPIRATION RATE: 18 BRPM

## 2017-09-21 PROCEDURE — 3331090002 HH PPS REVENUE DEBIT

## 2017-09-21 PROCEDURE — 3331090001 HH PPS REVENUE CREDIT

## 2017-09-22 PROCEDURE — 3331090002 HH PPS REVENUE DEBIT

## 2017-09-22 PROCEDURE — 3331090001 HH PPS REVENUE CREDIT

## 2017-09-23 PROCEDURE — 3331090001 HH PPS REVENUE CREDIT

## 2017-09-23 PROCEDURE — 3331090002 HH PPS REVENUE DEBIT

## 2017-09-24 PROCEDURE — 3331090001 HH PPS REVENUE CREDIT

## 2017-09-24 PROCEDURE — 3331090002 HH PPS REVENUE DEBIT

## 2017-09-25 PROCEDURE — 3331090002 HH PPS REVENUE DEBIT

## 2017-09-25 PROCEDURE — 3331090001 HH PPS REVENUE CREDIT

## 2017-09-26 PROCEDURE — 3331090002 HH PPS REVENUE DEBIT

## 2017-09-26 PROCEDURE — 3331090001 HH PPS REVENUE CREDIT

## 2017-09-27 ENCOUNTER — HOME CARE VISIT (OUTPATIENT)
Dept: SCHEDULING | Facility: HOME HEALTH | Age: 82
End: 2017-09-27
Payer: MEDICARE

## 2017-09-27 PROCEDURE — 3331090001 HH PPS REVENUE CREDIT

## 2017-09-27 PROCEDURE — G0300 HHS/HOSPICE OF LPN EA 15 MIN: HCPCS

## 2017-09-27 PROCEDURE — 3331090002 HH PPS REVENUE DEBIT

## 2017-09-28 ENCOUNTER — HOME CARE VISIT (OUTPATIENT)
Dept: SCHEDULING | Facility: HOME HEALTH | Age: 82
End: 2017-09-28
Payer: MEDICARE

## 2017-09-28 VITALS
TEMPERATURE: 98.5 F | DIASTOLIC BLOOD PRESSURE: 68 MMHG | OXYGEN SATURATION: 95 % | HEART RATE: 77 BPM | SYSTOLIC BLOOD PRESSURE: 126 MMHG | RESPIRATION RATE: 16 BRPM

## 2017-09-28 VITALS
SYSTOLIC BLOOD PRESSURE: 140 MMHG | RESPIRATION RATE: 18 BRPM | DIASTOLIC BLOOD PRESSURE: 80 MMHG | HEART RATE: 96 BPM | TEMPERATURE: 99.6 F | OXYGEN SATURATION: 98 %

## 2017-09-28 PROCEDURE — G0300 HHS/HOSPICE OF LPN EA 15 MIN: HCPCS

## 2017-09-28 PROCEDURE — 3331090001 HH PPS REVENUE CREDIT

## 2017-09-28 PROCEDURE — 3331090002 HH PPS REVENUE DEBIT

## 2017-09-29 PROCEDURE — 3331090001 HH PPS REVENUE CREDIT

## 2017-09-29 PROCEDURE — 3331090002 HH PPS REVENUE DEBIT

## 2017-09-30 PROCEDURE — 3331090002 HH PPS REVENUE DEBIT

## 2017-09-30 PROCEDURE — 3331090001 HH PPS REVENUE CREDIT

## 2017-10-01 PROCEDURE — 3331090002 HH PPS REVENUE DEBIT

## 2017-10-01 PROCEDURE — 3331090001 HH PPS REVENUE CREDIT

## 2017-10-02 PROCEDURE — 3331090001 HH PPS REVENUE CREDIT

## 2017-10-02 PROCEDURE — 3331090002 HH PPS REVENUE DEBIT

## 2017-10-03 PROCEDURE — 3331090002 HH PPS REVENUE DEBIT

## 2017-10-03 PROCEDURE — 3331090001 HH PPS REVENUE CREDIT

## 2017-10-04 PROCEDURE — 3331090001 HH PPS REVENUE CREDIT

## 2017-10-04 PROCEDURE — 3331090002 HH PPS REVENUE DEBIT

## 2017-10-05 PROCEDURE — 3331090001 HH PPS REVENUE CREDIT

## 2017-10-05 PROCEDURE — 3331090002 HH PPS REVENUE DEBIT

## 2017-10-06 PROCEDURE — 3331090002 HH PPS REVENUE DEBIT

## 2017-10-06 PROCEDURE — 3331090001 HH PPS REVENUE CREDIT

## 2017-10-07 ENCOUNTER — HOME CARE VISIT (OUTPATIENT)
Dept: SCHEDULING | Facility: HOME HEALTH | Age: 82
End: 2017-10-07
Payer: MEDICARE

## 2017-10-07 VITALS
DIASTOLIC BLOOD PRESSURE: 54 MMHG | TEMPERATURE: 98.4 F | SYSTOLIC BLOOD PRESSURE: 108 MMHG | OXYGEN SATURATION: 97 % | HEART RATE: 68 BPM | RESPIRATION RATE: 18 BRPM

## 2017-10-07 PROCEDURE — 3331090001 HH PPS REVENUE CREDIT

## 2017-10-07 PROCEDURE — 3331090002 HH PPS REVENUE DEBIT

## 2017-10-07 PROCEDURE — G0300 HHS/HOSPICE OF LPN EA 15 MIN: HCPCS

## 2017-10-08 PROCEDURE — 3331090002 HH PPS REVENUE DEBIT

## 2017-10-08 PROCEDURE — 3331090001 HH PPS REVENUE CREDIT

## 2017-10-09 PROCEDURE — 3331090002 HH PPS REVENUE DEBIT

## 2017-10-09 PROCEDURE — 3331090001 HH PPS REVENUE CREDIT

## 2017-10-10 PROCEDURE — 3331090001 HH PPS REVENUE CREDIT

## 2017-10-10 PROCEDURE — 3331090002 HH PPS REVENUE DEBIT

## 2017-10-11 ENCOUNTER — HOME CARE VISIT (OUTPATIENT)
Dept: SCHEDULING | Facility: HOME HEALTH | Age: 82
End: 2017-10-11
Payer: MEDICARE

## 2017-10-11 VITALS
OXYGEN SATURATION: 93 % | TEMPERATURE: 99.2 F | RESPIRATION RATE: 18 BRPM | HEART RATE: 77 BPM | SYSTOLIC BLOOD PRESSURE: 118 MMHG | DIASTOLIC BLOOD PRESSURE: 70 MMHG

## 2017-10-11 PROCEDURE — 3331090001 HH PPS REVENUE CREDIT

## 2017-10-11 PROCEDURE — G0299 HHS/HOSPICE OF RN EA 15 MIN: HCPCS

## 2017-10-11 PROCEDURE — 3331090002 HH PPS REVENUE DEBIT

## 2017-10-12 PROCEDURE — 3331090001 HH PPS REVENUE CREDIT

## 2017-10-12 PROCEDURE — 3331090002 HH PPS REVENUE DEBIT

## 2017-10-13 PROCEDURE — 3331090001 HH PPS REVENUE CREDIT

## 2017-10-13 PROCEDURE — 3331090002 HH PPS REVENUE DEBIT

## 2017-10-14 PROCEDURE — 3331090002 HH PPS REVENUE DEBIT

## 2017-10-14 PROCEDURE — 3331090001 HH PPS REVENUE CREDIT

## 2017-10-15 PROCEDURE — 3331090001 HH PPS REVENUE CREDIT

## 2017-10-15 PROCEDURE — 3331090002 HH PPS REVENUE DEBIT

## 2017-10-16 PROCEDURE — 3331090001 HH PPS REVENUE CREDIT

## 2017-10-16 PROCEDURE — 3331090002 HH PPS REVENUE DEBIT

## 2017-10-17 ENCOUNTER — HOME CARE VISIT (OUTPATIENT)
Dept: SCHEDULING | Facility: HOME HEALTH | Age: 82
End: 2017-10-17
Payer: MEDICARE

## 2017-10-17 VITALS
RESPIRATION RATE: 18 BRPM | SYSTOLIC BLOOD PRESSURE: 148 MMHG | OXYGEN SATURATION: 99 % | TEMPERATURE: 97.8 F | HEART RATE: 73 BPM | DIASTOLIC BLOOD PRESSURE: 78 MMHG

## 2017-10-17 PROCEDURE — 3331090002 HH PPS REVENUE DEBIT

## 2017-10-17 PROCEDURE — 3331090001 HH PPS REVENUE CREDIT

## 2017-10-17 PROCEDURE — G0299 HHS/HOSPICE OF RN EA 15 MIN: HCPCS

## 2017-10-18 PROCEDURE — 3331090001 HH PPS REVENUE CREDIT

## 2017-10-18 PROCEDURE — 3331090002 HH PPS REVENUE DEBIT

## 2017-10-19 PROCEDURE — 3331090002 HH PPS REVENUE DEBIT

## 2017-10-19 PROCEDURE — 3331090001 HH PPS REVENUE CREDIT

## 2017-10-20 PROCEDURE — 3331090002 HH PPS REVENUE DEBIT

## 2017-10-20 PROCEDURE — 3331090001 HH PPS REVENUE CREDIT

## 2017-10-21 PROCEDURE — 3331090001 HH PPS REVENUE CREDIT

## 2017-10-21 PROCEDURE — 3331090002 HH PPS REVENUE DEBIT

## 2017-10-22 PROCEDURE — 3331090002 HH PPS REVENUE DEBIT

## 2017-10-22 PROCEDURE — 3331090001 HH PPS REVENUE CREDIT

## 2017-10-23 PROCEDURE — 3331090001 HH PPS REVENUE CREDIT

## 2017-10-23 PROCEDURE — 3331090002 HH PPS REVENUE DEBIT

## 2017-10-24 PROCEDURE — 3331090001 HH PPS REVENUE CREDIT

## 2017-10-24 PROCEDURE — 3331090002 HH PPS REVENUE DEBIT

## 2017-10-25 ENCOUNTER — HOME CARE VISIT (OUTPATIENT)
Dept: SCHEDULING | Facility: HOME HEALTH | Age: 82
End: 2017-10-25
Payer: MEDICARE

## 2017-10-25 VITALS
OXYGEN SATURATION: 99 % | RESPIRATION RATE: 18 BRPM | DIASTOLIC BLOOD PRESSURE: 62 MMHG | HEART RATE: 72 BPM | SYSTOLIC BLOOD PRESSURE: 120 MMHG | TEMPERATURE: 97.3 F

## 2017-10-25 PROCEDURE — 400014 HH F/U

## 2017-10-25 PROCEDURE — 3331090002 HH PPS REVENUE DEBIT

## 2017-10-25 PROCEDURE — 3331090001 HH PPS REVENUE CREDIT

## 2017-10-25 PROCEDURE — G0299 HHS/HOSPICE OF RN EA 15 MIN: HCPCS

## 2017-10-26 PROCEDURE — 3331090002 HH PPS REVENUE DEBIT

## 2017-10-26 PROCEDURE — 3331090001 HH PPS REVENUE CREDIT

## 2017-10-27 PROCEDURE — 3331090001 HH PPS REVENUE CREDIT

## 2017-10-27 PROCEDURE — 3331090002 HH PPS REVENUE DEBIT

## 2017-10-28 PROCEDURE — 3331090002 HH PPS REVENUE DEBIT

## 2017-10-28 PROCEDURE — 3331090001 HH PPS REVENUE CREDIT

## 2017-10-29 PROCEDURE — 3331090002 HH PPS REVENUE DEBIT

## 2017-10-29 PROCEDURE — 3331090001 HH PPS REVENUE CREDIT

## 2017-10-30 PROCEDURE — 3331090001 HH PPS REVENUE CREDIT

## 2017-10-30 PROCEDURE — 3331090002 HH PPS REVENUE DEBIT

## 2017-10-31 PROCEDURE — 3331090001 HH PPS REVENUE CREDIT

## 2017-10-31 PROCEDURE — 3331090002 HH PPS REVENUE DEBIT

## 2017-11-01 ENCOUNTER — HOME CARE VISIT (OUTPATIENT)
Dept: SCHEDULING | Facility: HOME HEALTH | Age: 82
End: 2017-11-01
Payer: MEDICARE

## 2017-11-01 PROCEDURE — G0299 HHS/HOSPICE OF RN EA 15 MIN: HCPCS

## 2017-11-01 PROCEDURE — 3331090001 HH PPS REVENUE CREDIT

## 2017-11-01 PROCEDURE — 3331090002 HH PPS REVENUE DEBIT

## 2017-11-02 VITALS
DIASTOLIC BLOOD PRESSURE: 78 MMHG | HEART RATE: 73 BPM | TEMPERATURE: 99.3 F | SYSTOLIC BLOOD PRESSURE: 140 MMHG | RESPIRATION RATE: 18 BRPM | OXYGEN SATURATION: 99 %

## 2017-11-02 PROCEDURE — 3331090002 HH PPS REVENUE DEBIT

## 2017-11-02 PROCEDURE — 3331090001 HH PPS REVENUE CREDIT

## 2017-11-03 PROCEDURE — 3331090002 HH PPS REVENUE DEBIT

## 2017-11-03 PROCEDURE — 3331090001 HH PPS REVENUE CREDIT

## 2017-11-04 PROCEDURE — 3331090002 HH PPS REVENUE DEBIT

## 2017-11-04 PROCEDURE — 3331090001 HH PPS REVENUE CREDIT

## 2017-11-05 PROCEDURE — 3331090002 HH PPS REVENUE DEBIT

## 2017-11-05 PROCEDURE — 3331090001 HH PPS REVENUE CREDIT

## 2017-11-06 PROCEDURE — 3331090001 HH PPS REVENUE CREDIT

## 2017-11-06 PROCEDURE — 3331090002 HH PPS REVENUE DEBIT

## 2017-11-07 PROCEDURE — 3331090002 HH PPS REVENUE DEBIT

## 2017-11-07 PROCEDURE — 3331090001 HH PPS REVENUE CREDIT

## 2017-11-08 PROCEDURE — 3331090001 HH PPS REVENUE CREDIT

## 2017-11-08 PROCEDURE — 3331090002 HH PPS REVENUE DEBIT

## 2017-11-09 ENCOUNTER — HOME CARE VISIT (OUTPATIENT)
Dept: SCHEDULING | Facility: HOME HEALTH | Age: 82
End: 2017-11-09
Payer: MEDICARE

## 2017-11-09 PROCEDURE — 3331090001 HH PPS REVENUE CREDIT

## 2017-11-09 PROCEDURE — G0299 HHS/HOSPICE OF RN EA 15 MIN: HCPCS

## 2017-11-09 PROCEDURE — 3331090002 HH PPS REVENUE DEBIT

## 2017-11-10 VITALS
OXYGEN SATURATION: 99 % | TEMPERATURE: 98.4 F | DIASTOLIC BLOOD PRESSURE: 70 MMHG | RESPIRATION RATE: 18 BRPM | SYSTOLIC BLOOD PRESSURE: 126 MMHG | HEART RATE: 71 BPM

## 2017-11-10 PROCEDURE — 3331090001 HH PPS REVENUE CREDIT

## 2017-11-10 PROCEDURE — 3331090002 HH PPS REVENUE DEBIT

## 2017-11-11 PROCEDURE — 3331090002 HH PPS REVENUE DEBIT

## 2017-11-11 PROCEDURE — 3331090001 HH PPS REVENUE CREDIT

## 2017-11-12 PROCEDURE — 3331090001 HH PPS REVENUE CREDIT

## 2017-11-12 PROCEDURE — 3331090002 HH PPS REVENUE DEBIT

## 2017-11-13 PROCEDURE — 3331090001 HH PPS REVENUE CREDIT

## 2017-11-13 PROCEDURE — 3331090002 HH PPS REVENUE DEBIT

## 2017-11-14 PROCEDURE — 3331090001 HH PPS REVENUE CREDIT

## 2017-11-14 PROCEDURE — 3331090002 HH PPS REVENUE DEBIT

## 2017-11-15 ENCOUNTER — HOME CARE VISIT (OUTPATIENT)
Dept: SCHEDULING | Facility: HOME HEALTH | Age: 82
End: 2017-11-15
Payer: MEDICARE

## 2017-11-15 VITALS
RESPIRATION RATE: 18 BRPM | DIASTOLIC BLOOD PRESSURE: 78 MMHG | OXYGEN SATURATION: 99 % | SYSTOLIC BLOOD PRESSURE: 128 MMHG | HEART RATE: 88 BPM | TEMPERATURE: 98.1 F

## 2017-11-15 PROCEDURE — A4338 INDWELLING CATHETER LATEX: HCPCS

## 2017-11-15 PROCEDURE — A4357 BEDSIDE DRAINAGE BAG: HCPCS

## 2017-11-15 PROCEDURE — A4649 SURGICAL SUPPLIES: HCPCS

## 2017-11-15 PROCEDURE — A4320 IRRIGATION TRAY: HCPCS

## 2017-11-15 PROCEDURE — G0299 HHS/HOSPICE OF RN EA 15 MIN: HCPCS

## 2017-11-15 PROCEDURE — 3331090001 HH PPS REVENUE CREDIT

## 2017-11-15 PROCEDURE — 3331090002 HH PPS REVENUE DEBIT

## 2017-11-15 PROCEDURE — A4354 CATH INSERTION TRAY W/BAG: HCPCS

## 2017-11-16 PROCEDURE — 3331090001 HH PPS REVENUE CREDIT

## 2017-11-16 PROCEDURE — 3331090002 HH PPS REVENUE DEBIT

## 2017-11-17 PROCEDURE — 3331090001 HH PPS REVENUE CREDIT

## 2017-11-17 PROCEDURE — 3331090002 HH PPS REVENUE DEBIT

## 2017-11-18 PROCEDURE — 3331090002 HH PPS REVENUE DEBIT

## 2017-11-18 PROCEDURE — 3331090001 HH PPS REVENUE CREDIT

## 2017-11-19 PROCEDURE — 3331090002 HH PPS REVENUE DEBIT

## 2017-11-19 PROCEDURE — 3331090001 HH PPS REVENUE CREDIT

## 2017-11-20 PROCEDURE — 3331090001 HH PPS REVENUE CREDIT

## 2017-11-20 PROCEDURE — 3331090002 HH PPS REVENUE DEBIT

## 2017-11-21 PROCEDURE — 3331090002 HH PPS REVENUE DEBIT

## 2017-11-21 PROCEDURE — 3331090001 HH PPS REVENUE CREDIT

## 2017-11-22 PROCEDURE — 3331090001 HH PPS REVENUE CREDIT

## 2017-11-22 PROCEDURE — 3331090002 HH PPS REVENUE DEBIT

## 2017-11-23 ENCOUNTER — HOME CARE VISIT (OUTPATIENT)
Dept: SCHEDULING | Facility: HOME HEALTH | Age: 82
End: 2017-11-23
Payer: MEDICARE

## 2017-11-23 VITALS
DIASTOLIC BLOOD PRESSURE: 70 MMHG | HEART RATE: 61 BPM | TEMPERATURE: 98.4 F | OXYGEN SATURATION: 98 % | RESPIRATION RATE: 18 BRPM | SYSTOLIC BLOOD PRESSURE: 130 MMHG

## 2017-11-23 PROCEDURE — 3331090001 HH PPS REVENUE CREDIT

## 2017-11-23 PROCEDURE — 3331090002 HH PPS REVENUE DEBIT

## 2017-11-23 PROCEDURE — G0299 HHS/HOSPICE OF RN EA 15 MIN: HCPCS

## 2017-11-24 PROCEDURE — 3331090001 HH PPS REVENUE CREDIT

## 2017-11-24 PROCEDURE — 3331090002 HH PPS REVENUE DEBIT

## 2017-11-25 PROCEDURE — 3331090002 HH PPS REVENUE DEBIT

## 2017-11-25 PROCEDURE — 3331090001 HH PPS REVENUE CREDIT

## 2017-11-26 PROCEDURE — 3331090001 HH PPS REVENUE CREDIT

## 2017-11-26 PROCEDURE — 3331090002 HH PPS REVENUE DEBIT

## 2017-11-27 PROCEDURE — 3331090002 HH PPS REVENUE DEBIT

## 2017-11-27 PROCEDURE — 3331090001 HH PPS REVENUE CREDIT

## 2017-11-28 PROCEDURE — 3331090002 HH PPS REVENUE DEBIT

## 2017-11-28 PROCEDURE — 3331090001 HH PPS REVENUE CREDIT

## 2017-11-29 ENCOUNTER — HOME CARE VISIT (OUTPATIENT)
Dept: SCHEDULING | Facility: HOME HEALTH | Age: 82
End: 2017-11-29
Payer: MEDICARE

## 2017-11-29 VITALS
OXYGEN SATURATION: 99 % | HEART RATE: 85 BPM | RESPIRATION RATE: 20 BRPM | SYSTOLIC BLOOD PRESSURE: 124 MMHG | TEMPERATURE: 98.6 F | DIASTOLIC BLOOD PRESSURE: 70 MMHG

## 2017-11-29 PROCEDURE — 3331090002 HH PPS REVENUE DEBIT

## 2017-11-29 PROCEDURE — 3331090001 HH PPS REVENUE CREDIT

## 2017-11-29 PROCEDURE — G0299 HHS/HOSPICE OF RN EA 15 MIN: HCPCS

## 2017-11-30 PROCEDURE — 3331090001 HH PPS REVENUE CREDIT

## 2017-11-30 PROCEDURE — 3331090002 HH PPS REVENUE DEBIT

## 2017-12-01 ENCOUNTER — HOME CARE VISIT (OUTPATIENT)
Dept: SCHEDULING | Facility: HOME HEALTH | Age: 82
End: 2017-12-01
Payer: MEDICARE

## 2017-12-01 PROCEDURE — 3331090002 HH PPS REVENUE DEBIT

## 2017-12-01 PROCEDURE — 3331090001 HH PPS REVENUE CREDIT

## 2017-12-01 PROCEDURE — G0300 HHS/HOSPICE OF LPN EA 15 MIN: HCPCS

## 2017-12-02 PROCEDURE — 3331090002 HH PPS REVENUE DEBIT

## 2017-12-02 PROCEDURE — 3331090001 HH PPS REVENUE CREDIT

## 2017-12-03 PROCEDURE — 3331090001 HH PPS REVENUE CREDIT

## 2017-12-03 PROCEDURE — 3331090002 HH PPS REVENUE DEBIT

## 2017-12-04 VITALS
RESPIRATION RATE: 18 BRPM | OXYGEN SATURATION: 98 % | HEART RATE: 78 BPM | TEMPERATURE: 99.2 F | DIASTOLIC BLOOD PRESSURE: 78 MMHG | SYSTOLIC BLOOD PRESSURE: 132 MMHG

## 2017-12-04 PROCEDURE — 3331090002 HH PPS REVENUE DEBIT

## 2017-12-04 PROCEDURE — 3331090001 HH PPS REVENUE CREDIT

## 2017-12-05 PROCEDURE — 3331090001 HH PPS REVENUE CREDIT

## 2017-12-05 PROCEDURE — 3331090002 HH PPS REVENUE DEBIT

## 2017-12-06 PROCEDURE — 3331090002 HH PPS REVENUE DEBIT

## 2017-12-06 PROCEDURE — 3331090001 HH PPS REVENUE CREDIT

## 2017-12-07 ENCOUNTER — HOME CARE VISIT (OUTPATIENT)
Dept: SCHEDULING | Facility: HOME HEALTH | Age: 82
End: 2017-12-07
Payer: MEDICARE

## 2017-12-07 PROCEDURE — 3331090001 HH PPS REVENUE CREDIT

## 2017-12-07 PROCEDURE — 3331090002 HH PPS REVENUE DEBIT

## 2017-12-07 PROCEDURE — G0299 HHS/HOSPICE OF RN EA 15 MIN: HCPCS

## 2017-12-08 VITALS
OXYGEN SATURATION: 97 % | TEMPERATURE: 97.7 F | HEART RATE: 92 BPM | RESPIRATION RATE: 18 BRPM | DIASTOLIC BLOOD PRESSURE: 62 MMHG | SYSTOLIC BLOOD PRESSURE: 110 MMHG

## 2017-12-08 PROCEDURE — 3331090001 HH PPS REVENUE CREDIT

## 2017-12-08 PROCEDURE — 3331090002 HH PPS REVENUE DEBIT

## 2017-12-09 PROCEDURE — 3331090001 HH PPS REVENUE CREDIT

## 2017-12-09 PROCEDURE — 3331090002 HH PPS REVENUE DEBIT

## 2017-12-10 PROCEDURE — 3331090001 HH PPS REVENUE CREDIT

## 2017-12-10 PROCEDURE — 3331090002 HH PPS REVENUE DEBIT

## 2017-12-11 PROCEDURE — 3331090001 HH PPS REVENUE CREDIT

## 2017-12-11 PROCEDURE — 3331090002 HH PPS REVENUE DEBIT

## 2017-12-12 PROCEDURE — 3331090001 HH PPS REVENUE CREDIT

## 2017-12-12 PROCEDURE — 3331090002 HH PPS REVENUE DEBIT

## 2017-12-13 ENCOUNTER — HOME CARE VISIT (OUTPATIENT)
Dept: SCHEDULING | Facility: HOME HEALTH | Age: 82
End: 2017-12-13
Payer: MEDICARE

## 2017-12-13 VITALS
TEMPERATURE: 99.3 F | RESPIRATION RATE: 18 BRPM | DIASTOLIC BLOOD PRESSURE: 78 MMHG | OXYGEN SATURATION: 99 % | SYSTOLIC BLOOD PRESSURE: 122 MMHG | HEART RATE: 82 BPM

## 2017-12-13 PROCEDURE — 3331090001 HH PPS REVENUE CREDIT

## 2017-12-13 PROCEDURE — G0299 HHS/HOSPICE OF RN EA 15 MIN: HCPCS

## 2017-12-13 PROCEDURE — 3331090002 HH PPS REVENUE DEBIT

## 2017-12-14 PROCEDURE — 3331090001 HH PPS REVENUE CREDIT

## 2017-12-14 PROCEDURE — 3331090002 HH PPS REVENUE DEBIT

## 2017-12-15 PROCEDURE — 3331090001 HH PPS REVENUE CREDIT

## 2017-12-15 PROCEDURE — 3331090002 HH PPS REVENUE DEBIT

## 2017-12-16 PROCEDURE — 3331090002 HH PPS REVENUE DEBIT

## 2017-12-16 PROCEDURE — 3331090001 HH PPS REVENUE CREDIT

## 2017-12-17 PROCEDURE — 3331090001 HH PPS REVENUE CREDIT

## 2017-12-17 PROCEDURE — 3331090002 HH PPS REVENUE DEBIT

## 2017-12-18 PROCEDURE — 3331090002 HH PPS REVENUE DEBIT

## 2017-12-18 PROCEDURE — 3331090001 HH PPS REVENUE CREDIT

## 2017-12-19 ENCOUNTER — HOME CARE VISIT (OUTPATIENT)
Dept: SCHEDULING | Facility: HOME HEALTH | Age: 82
End: 2017-12-19
Payer: MEDICARE

## 2017-12-19 PROCEDURE — 3331090002 HH PPS REVENUE DEBIT

## 2017-12-19 PROCEDURE — 400014 HH F/U

## 2017-12-19 PROCEDURE — G0299 HHS/HOSPICE OF RN EA 15 MIN: HCPCS

## 2017-12-19 PROCEDURE — 3331090001 HH PPS REVENUE CREDIT

## 2017-12-20 PROCEDURE — 3331090001 HH PPS REVENUE CREDIT

## 2017-12-20 PROCEDURE — 3331090002 HH PPS REVENUE DEBIT

## 2017-12-21 VITALS
HEART RATE: 66 BPM | RESPIRATION RATE: 20 BRPM | TEMPERATURE: 98.7 F | DIASTOLIC BLOOD PRESSURE: 80 MMHG | OXYGEN SATURATION: 99 % | SYSTOLIC BLOOD PRESSURE: 128 MMHG

## 2017-12-21 PROCEDURE — 3331090001 HH PPS REVENUE CREDIT

## 2017-12-21 PROCEDURE — 3331090002 HH PPS REVENUE DEBIT

## 2017-12-22 PROCEDURE — 3331090002 HH PPS REVENUE DEBIT

## 2017-12-22 PROCEDURE — 3331090001 HH PPS REVENUE CREDIT

## 2017-12-23 PROCEDURE — 3331090002 HH PPS REVENUE DEBIT

## 2017-12-23 PROCEDURE — 3331090001 HH PPS REVENUE CREDIT

## 2017-12-24 PROCEDURE — 3331090002 HH PPS REVENUE DEBIT

## 2017-12-24 PROCEDURE — 3331090001 HH PPS REVENUE CREDIT

## 2017-12-25 PROCEDURE — 3331090002 HH PPS REVENUE DEBIT

## 2017-12-25 PROCEDURE — 3331090001 HH PPS REVENUE CREDIT

## 2017-12-26 PROCEDURE — 3331090002 HH PPS REVENUE DEBIT

## 2017-12-26 PROCEDURE — 3331090001 HH PPS REVENUE CREDIT

## 2017-12-27 PROCEDURE — 3331090001 HH PPS REVENUE CREDIT

## 2017-12-27 PROCEDURE — 3331090002 HH PPS REVENUE DEBIT

## 2017-12-28 ENCOUNTER — HOME CARE VISIT (OUTPATIENT)
Dept: SCHEDULING | Facility: HOME HEALTH | Age: 82
End: 2017-12-28
Payer: MEDICARE

## 2017-12-28 VITALS
RESPIRATION RATE: 20 BRPM | HEART RATE: 79 BPM | SYSTOLIC BLOOD PRESSURE: 130 MMHG | TEMPERATURE: 98.7 F | DIASTOLIC BLOOD PRESSURE: 60 MMHG | OXYGEN SATURATION: 99 %

## 2017-12-28 PROCEDURE — 3331090001 HH PPS REVENUE CREDIT

## 2017-12-28 PROCEDURE — 3331090002 HH PPS REVENUE DEBIT

## 2017-12-28 PROCEDURE — G0299 HHS/HOSPICE OF RN EA 15 MIN: HCPCS

## 2017-12-29 PROCEDURE — 3331090001 HH PPS REVENUE CREDIT

## 2017-12-29 PROCEDURE — 3331090002 HH PPS REVENUE DEBIT

## 2017-12-30 PROCEDURE — 3331090001 HH PPS REVENUE CREDIT

## 2017-12-30 PROCEDURE — 3331090002 HH PPS REVENUE DEBIT

## 2017-12-31 PROCEDURE — 3331090001 HH PPS REVENUE CREDIT

## 2017-12-31 PROCEDURE — 3331090002 HH PPS REVENUE DEBIT

## 2018-01-01 ENCOUNTER — HOME CARE VISIT (OUTPATIENT)
Dept: HOSPICE | Facility: HOSPICE | Age: 83
End: 2018-01-01
Payer: MEDICARE

## 2018-01-01 ENCOUNTER — HOME CARE VISIT (OUTPATIENT)
Dept: HOME HEALTH SERVICES | Facility: HOME HEALTH | Age: 83
End: 2018-01-01
Payer: MEDICARE

## 2018-01-01 ENCOUNTER — HOME CARE VISIT (OUTPATIENT)
Dept: SCHEDULING | Facility: HOME HEALTH | Age: 83
End: 2018-01-01
Payer: MEDICARE

## 2018-01-01 ENCOUNTER — HOSPICE ADMISSION (OUTPATIENT)
Dept: HOSPICE | Facility: HOSPICE | Age: 83
End: 2018-01-01
Payer: MEDICARE

## 2018-01-01 ENCOUNTER — TELEPHONE (OUTPATIENT)
Dept: FAMILY MEDICINE CLINIC | Age: 83
End: 2018-01-01

## 2018-01-01 ENCOUNTER — DOCUMENTATION ONLY (OUTPATIENT)
Dept: FAMILY MEDICINE CLINIC | Age: 83
End: 2018-01-01

## 2018-01-01 ENCOUNTER — HOSPITAL ENCOUNTER (OUTPATIENT)
Dept: LAB | Age: 83
Discharge: HOME | End: 2018-10-16
Payer: MEDICARE

## 2018-01-01 ENCOUNTER — HOME VISIT (OUTPATIENT)
Dept: FAMILY MEDICINE CLINIC | Age: 83
End: 2018-01-01

## 2018-01-01 ENCOUNTER — NURSE NAVIGATOR (OUTPATIENT)
Dept: PALLATIVE CARE | Age: 83
End: 2018-01-01

## 2018-01-01 ENCOUNTER — HOSPITAL ENCOUNTER (OUTPATIENT)
Dept: LAB | Age: 83
Discharge: HOME | End: 2018-10-24
Payer: MEDICARE

## 2018-01-01 ENCOUNTER — TELEPHONE (OUTPATIENT)
Dept: PALLATIVE CARE | Age: 83
End: 2018-01-01

## 2018-01-01 ENCOUNTER — HOSPITAL ENCOUNTER (OUTPATIENT)
Dept: LAB | Age: 83
Discharge: HOME | End: 2018-07-02
Payer: MEDICARE

## 2018-01-01 ENCOUNTER — OFFICE VISIT (OUTPATIENT)
Dept: FAMILY MEDICINE CLINIC | Age: 83
End: 2018-01-01

## 2018-01-01 ENCOUNTER — DOCUMENTATION ONLY (OUTPATIENT)
Dept: PALLATIVE CARE | Age: 83
End: 2018-01-01

## 2018-01-01 VITALS
TEMPERATURE: 98.8 F | OXYGEN SATURATION: 98 % | DIASTOLIC BLOOD PRESSURE: 62 MMHG | HEART RATE: 96 BPM | SYSTOLIC BLOOD PRESSURE: 118 MMHG

## 2018-01-01 VITALS — SYSTOLIC BLOOD PRESSURE: 120 MMHG | DIASTOLIC BLOOD PRESSURE: 64 MMHG | OXYGEN SATURATION: 99 % | HEART RATE: 82 BPM

## 2018-01-01 VITALS
OXYGEN SATURATION: 99 % | SYSTOLIC BLOOD PRESSURE: 110 MMHG | RESPIRATION RATE: 16 BRPM | DIASTOLIC BLOOD PRESSURE: 70 MMHG | HEART RATE: 86 BPM | TEMPERATURE: 98.6 F

## 2018-01-01 VITALS
TEMPERATURE: 98.1 F | DIASTOLIC BLOOD PRESSURE: 94 MMHG | RESPIRATION RATE: 20 BRPM | HEART RATE: 68 BPM | RESPIRATION RATE: 20 BRPM | HEART RATE: 68 BPM | OXYGEN SATURATION: 99 % | OXYGEN SATURATION: 99 % | SYSTOLIC BLOOD PRESSURE: 141 MMHG | TEMPERATURE: 97.5 F

## 2018-01-01 VITALS
DIASTOLIC BLOOD PRESSURE: 62 MMHG | RESPIRATION RATE: 20 BRPM | TEMPERATURE: 99.3 F | OXYGEN SATURATION: 98 % | HEART RATE: 85 BPM | SYSTOLIC BLOOD PRESSURE: 130 MMHG

## 2018-01-01 VITALS
DIASTOLIC BLOOD PRESSURE: 62 MMHG | OXYGEN SATURATION: 99 % | SYSTOLIC BLOOD PRESSURE: 106 MMHG | TEMPERATURE: 97.8 F | HEART RATE: 81 BPM | RESPIRATION RATE: 18 BRPM

## 2018-01-01 VITALS
HEART RATE: 84 BPM | TEMPERATURE: 99 F | OXYGEN SATURATION: 99 % | SYSTOLIC BLOOD PRESSURE: 120 MMHG | DIASTOLIC BLOOD PRESSURE: 70 MMHG | RESPIRATION RATE: 20 BRPM

## 2018-01-01 VITALS
TEMPERATURE: 97.4 F | OXYGEN SATURATION: 99 % | DIASTOLIC BLOOD PRESSURE: 60 MMHG | HEART RATE: 71 BPM | SYSTOLIC BLOOD PRESSURE: 110 MMHG

## 2018-01-01 VITALS — SYSTOLIC BLOOD PRESSURE: 118 MMHG | RESPIRATION RATE: 16 BRPM | TEMPERATURE: 97 F | DIASTOLIC BLOOD PRESSURE: 58 MMHG

## 2018-01-01 VITALS
RESPIRATION RATE: 18 BRPM | SYSTOLIC BLOOD PRESSURE: 120 MMHG | OXYGEN SATURATION: 97 % | HEART RATE: 94 BPM | DIASTOLIC BLOOD PRESSURE: 80 MMHG | HEART RATE: 98 BPM | TEMPERATURE: 99 F | DIASTOLIC BLOOD PRESSURE: 80 MMHG | SYSTOLIC BLOOD PRESSURE: 124 MMHG | OXYGEN SATURATION: 97 %

## 2018-01-01 VITALS
HEART RATE: 76 BPM | DIASTOLIC BLOOD PRESSURE: 72 MMHG | RESPIRATION RATE: 18 BRPM | SYSTOLIC BLOOD PRESSURE: 138 MMHG | OXYGEN SATURATION: 99 % | TEMPERATURE: 99.4 F

## 2018-01-01 VITALS
RESPIRATION RATE: 18 BRPM | DIASTOLIC BLOOD PRESSURE: 68 MMHG | OXYGEN SATURATION: 95 % | SYSTOLIC BLOOD PRESSURE: 122 MMHG | TEMPERATURE: 98 F

## 2018-01-01 VITALS
RESPIRATION RATE: 18 BRPM | HEART RATE: 71 BPM | SYSTOLIC BLOOD PRESSURE: 128 MMHG | TEMPERATURE: 98.6 F | OXYGEN SATURATION: 99 % | DIASTOLIC BLOOD PRESSURE: 72 MMHG

## 2018-01-01 VITALS
OXYGEN SATURATION: 93 % | SYSTOLIC BLOOD PRESSURE: 130 MMHG | TEMPERATURE: 99.6 F | DIASTOLIC BLOOD PRESSURE: 66 MMHG | HEART RATE: 93 BPM | RESPIRATION RATE: 21 BRPM

## 2018-01-01 VITALS
TEMPERATURE: 98.9 F | DIASTOLIC BLOOD PRESSURE: 70 MMHG | SYSTOLIC BLOOD PRESSURE: 102 MMHG | RESPIRATION RATE: 20 BRPM | OXYGEN SATURATION: 90 % | HEART RATE: 65 BPM

## 2018-01-01 VITALS
OXYGEN SATURATION: 97 % | SYSTOLIC BLOOD PRESSURE: 128 MMHG | TEMPERATURE: 99 F | DIASTOLIC BLOOD PRESSURE: 74 MMHG | HEART RATE: 78 BPM

## 2018-01-01 VITALS
RESPIRATION RATE: 18 BRPM | TEMPERATURE: 98 F | DIASTOLIC BLOOD PRESSURE: 70 MMHG | OXYGEN SATURATION: 97 % | HEART RATE: 74 BPM | SYSTOLIC BLOOD PRESSURE: 112 MMHG

## 2018-01-01 VITALS
SYSTOLIC BLOOD PRESSURE: 120 MMHG | TEMPERATURE: 99.2 F | DIASTOLIC BLOOD PRESSURE: 60 MMHG | OXYGEN SATURATION: 98 % | HEART RATE: 72 BPM | RESPIRATION RATE: 18 BRPM

## 2018-01-01 VITALS — TEMPERATURE: 97.7 F | SYSTOLIC BLOOD PRESSURE: 128 MMHG | RESPIRATION RATE: 16 BRPM | DIASTOLIC BLOOD PRESSURE: 66 MMHG

## 2018-01-01 VITALS
DIASTOLIC BLOOD PRESSURE: 78 MMHG | SYSTOLIC BLOOD PRESSURE: 118 MMHG | RESPIRATION RATE: 16 BRPM | TEMPERATURE: 97.6 F | OXYGEN SATURATION: 99 % | HEART RATE: 88 BPM

## 2018-01-01 VITALS
RESPIRATION RATE: 20 BRPM | OXYGEN SATURATION: 99 % | HEART RATE: 71 BPM | DIASTOLIC BLOOD PRESSURE: 60 MMHG | TEMPERATURE: 98.7 F | SYSTOLIC BLOOD PRESSURE: 120 MMHG

## 2018-01-01 VITALS — DIASTOLIC BLOOD PRESSURE: 70 MMHG | SYSTOLIC BLOOD PRESSURE: 110 MMHG | RESPIRATION RATE: 20 BRPM | HEART RATE: 88 BPM

## 2018-01-01 VITALS
DIASTOLIC BLOOD PRESSURE: 67 MMHG | HEART RATE: 94 BPM | OXYGEN SATURATION: 99 % | SYSTOLIC BLOOD PRESSURE: 127 MMHG | TEMPERATURE: 98.8 F | RESPIRATION RATE: 18 BRPM

## 2018-01-01 VITALS
RESPIRATION RATE: 18 BRPM | DIASTOLIC BLOOD PRESSURE: 72 MMHG | OXYGEN SATURATION: 99 % | SYSTOLIC BLOOD PRESSURE: 112 MMHG | HEART RATE: 72 BPM | TEMPERATURE: 98.7 F

## 2018-01-01 VITALS
HEART RATE: 71 BPM | SYSTOLIC BLOOD PRESSURE: 110 MMHG | RESPIRATION RATE: 18 BRPM | TEMPERATURE: 99 F | DIASTOLIC BLOOD PRESSURE: 66 MMHG | OXYGEN SATURATION: 99 %

## 2018-01-01 VITALS
RESPIRATION RATE: 16 BRPM | OXYGEN SATURATION: 99 % | DIASTOLIC BLOOD PRESSURE: 54 MMHG | SYSTOLIC BLOOD PRESSURE: 169 MMHG | TEMPERATURE: 97.9 F | HEART RATE: 90 BPM

## 2018-01-01 VITALS — HEART RATE: 93 BPM | SYSTOLIC BLOOD PRESSURE: 120 MMHG | OXYGEN SATURATION: 98 % | DIASTOLIC BLOOD PRESSURE: 64 MMHG

## 2018-01-01 VITALS
OXYGEN SATURATION: 99 % | DIASTOLIC BLOOD PRESSURE: 78 MMHG | HEART RATE: 92 BPM | RESPIRATION RATE: 18 BRPM | SYSTOLIC BLOOD PRESSURE: 132 MMHG | TEMPERATURE: 99.8 F

## 2018-01-01 VITALS
HEART RATE: 100 BPM | TEMPERATURE: 98.4 F | RESPIRATION RATE: 20 BRPM | DIASTOLIC BLOOD PRESSURE: 78 MMHG | SYSTOLIC BLOOD PRESSURE: 140 MMHG | OXYGEN SATURATION: 99 %

## 2018-01-01 VITALS
DIASTOLIC BLOOD PRESSURE: 78 MMHG | SYSTOLIC BLOOD PRESSURE: 130 MMHG | OXYGEN SATURATION: 99 % | RESPIRATION RATE: 18 BRPM | TEMPERATURE: 98.7 F | HEART RATE: 91 BPM

## 2018-01-01 VITALS
SYSTOLIC BLOOD PRESSURE: 150 MMHG | DIASTOLIC BLOOD PRESSURE: 80 MMHG | TEMPERATURE: 98.9 F | HEART RATE: 77 BPM | RESPIRATION RATE: 18 BRPM | OXYGEN SATURATION: 99 %

## 2018-01-01 VITALS — RESPIRATION RATE: 16 BRPM | SYSTOLIC BLOOD PRESSURE: 128 MMHG | TEMPERATURE: 97 F | DIASTOLIC BLOOD PRESSURE: 60 MMHG

## 2018-01-01 VITALS
OXYGEN SATURATION: 92 % | SYSTOLIC BLOOD PRESSURE: 106 MMHG | RESPIRATION RATE: 18 BRPM | HEART RATE: 75 BPM | DIASTOLIC BLOOD PRESSURE: 68 MMHG | TEMPERATURE: 98.8 F

## 2018-01-01 VITALS
TEMPERATURE: 100 F | OXYGEN SATURATION: 99 % | SYSTOLIC BLOOD PRESSURE: 112 MMHG | RESPIRATION RATE: 16 BRPM | HEART RATE: 101 BPM | DIASTOLIC BLOOD PRESSURE: 68 MMHG

## 2018-01-01 VITALS
SYSTOLIC BLOOD PRESSURE: 110 MMHG | DIASTOLIC BLOOD PRESSURE: 70 MMHG | HEART RATE: 82 BPM | RESPIRATION RATE: 20 BRPM | OXYGEN SATURATION: 99 % | TEMPERATURE: 98.2 F

## 2018-01-01 VITALS
RESPIRATION RATE: 20 BRPM | DIASTOLIC BLOOD PRESSURE: 60 MMHG | OXYGEN SATURATION: 98 % | SYSTOLIC BLOOD PRESSURE: 120 MMHG | HEART RATE: 91 BPM | TEMPERATURE: 99 F

## 2018-01-01 VITALS
DIASTOLIC BLOOD PRESSURE: 74 MMHG | HEART RATE: 76 BPM | OXYGEN SATURATION: 98 % | SYSTOLIC BLOOD PRESSURE: 138 MMHG | RESPIRATION RATE: 18 BRPM

## 2018-01-01 VITALS
SYSTOLIC BLOOD PRESSURE: 138 MMHG | OXYGEN SATURATION: 95 % | RESPIRATION RATE: 16 BRPM | TEMPERATURE: 99.7 F | HEART RATE: 88 BPM | DIASTOLIC BLOOD PRESSURE: 77 MMHG

## 2018-01-01 VITALS
HEART RATE: 83 BPM | TEMPERATURE: 98.4 F | DIASTOLIC BLOOD PRESSURE: 70 MMHG | RESPIRATION RATE: 18 BRPM | OXYGEN SATURATION: 99 % | SYSTOLIC BLOOD PRESSURE: 110 MMHG

## 2018-01-01 VITALS
RESPIRATION RATE: 20 BRPM | DIASTOLIC BLOOD PRESSURE: 80 MMHG | SYSTOLIC BLOOD PRESSURE: 126 MMHG | HEART RATE: 76 BPM | OXYGEN SATURATION: 96 %

## 2018-01-01 VITALS
RESPIRATION RATE: 19 BRPM | HEART RATE: 70 BPM | OXYGEN SATURATION: 100 % | TEMPERATURE: 98.1 F | DIASTOLIC BLOOD PRESSURE: 60 MMHG | SYSTOLIC BLOOD PRESSURE: 118 MMHG

## 2018-01-01 VITALS
DIASTOLIC BLOOD PRESSURE: 70 MMHG | RESPIRATION RATE: 18 BRPM | OXYGEN SATURATION: 99 % | SYSTOLIC BLOOD PRESSURE: 110 MMHG | TEMPERATURE: 98 F | HEART RATE: 62 BPM

## 2018-01-01 VITALS
HEART RATE: 89 BPM | RESPIRATION RATE: 18 BRPM | SYSTOLIC BLOOD PRESSURE: 130 MMHG | OXYGEN SATURATION: 99 % | DIASTOLIC BLOOD PRESSURE: 70 MMHG | TEMPERATURE: 97.6 F

## 2018-01-01 VITALS
RESPIRATION RATE: 18 BRPM | TEMPERATURE: 98.2 F | HEART RATE: 88 BPM | OXYGEN SATURATION: 98 % | SYSTOLIC BLOOD PRESSURE: 124 MMHG | DIASTOLIC BLOOD PRESSURE: 70 MMHG

## 2018-01-01 VITALS
SYSTOLIC BLOOD PRESSURE: 100 MMHG | HEART RATE: 89 BPM | DIASTOLIC BLOOD PRESSURE: 60 MMHG | OXYGEN SATURATION: 98 % | RESPIRATION RATE: 18 BRPM | TEMPERATURE: 99 F

## 2018-01-01 VITALS
RESPIRATION RATE: 18 BRPM | OXYGEN SATURATION: 99 % | HEART RATE: 88 BPM | SYSTOLIC BLOOD PRESSURE: 118 MMHG | TEMPERATURE: 98.5 F | DIASTOLIC BLOOD PRESSURE: 68 MMHG

## 2018-01-01 VITALS
DIASTOLIC BLOOD PRESSURE: 58 MMHG | RESPIRATION RATE: 18 BRPM | TEMPERATURE: 98.2 F | HEART RATE: 84 BPM | WEIGHT: 75 LBS | OXYGEN SATURATION: 95 % | BODY MASS INDEX: 14.72 KG/M2 | HEIGHT: 60 IN | SYSTOLIC BLOOD PRESSURE: 100 MMHG

## 2018-01-01 VITALS
DIASTOLIC BLOOD PRESSURE: 60 MMHG | HEART RATE: 71 BPM | OXYGEN SATURATION: 99 % | SYSTOLIC BLOOD PRESSURE: 120 MMHG | TEMPERATURE: 98.6 F | RESPIRATION RATE: 18 BRPM

## 2018-01-01 VITALS
RESPIRATION RATE: 18 BRPM | HEART RATE: 91 BPM | TEMPERATURE: 98.8 F | SYSTOLIC BLOOD PRESSURE: 130 MMHG | OXYGEN SATURATION: 93 % | DIASTOLIC BLOOD PRESSURE: 64 MMHG

## 2018-01-01 VITALS — HEART RATE: 68 BPM | SYSTOLIC BLOOD PRESSURE: 118 MMHG | RESPIRATION RATE: 18 BRPM | DIASTOLIC BLOOD PRESSURE: 72 MMHG

## 2018-01-01 VITALS — SYSTOLIC BLOOD PRESSURE: 122 MMHG | DIASTOLIC BLOOD PRESSURE: 64 MMHG | RESPIRATION RATE: 16 BRPM | HEART RATE: 76 BPM

## 2018-01-01 VITALS
SYSTOLIC BLOOD PRESSURE: 102 MMHG | DIASTOLIC BLOOD PRESSURE: 64 MMHG | TEMPERATURE: 97.9 F | OXYGEN SATURATION: 96 % | HEART RATE: 64 BPM | RESPIRATION RATE: 18 BRPM

## 2018-01-01 VITALS
TEMPERATURE: 98.6 F | SYSTOLIC BLOOD PRESSURE: 120 MMHG | DIASTOLIC BLOOD PRESSURE: 80 MMHG | RESPIRATION RATE: 18 BRPM | OXYGEN SATURATION: 98 % | HEART RATE: 80 BPM

## 2018-01-01 VITALS
SYSTOLIC BLOOD PRESSURE: 132 MMHG | OXYGEN SATURATION: 99 % | HEART RATE: 91 BPM | HEIGHT: 60 IN | DIASTOLIC BLOOD PRESSURE: 79 MMHG | TEMPERATURE: 97.5 F | BODY MASS INDEX: 15.2 KG/M2 | RESPIRATION RATE: 16 BRPM | WEIGHT: 77.4 LBS

## 2018-01-01 VITALS
OXYGEN SATURATION: 99 % | TEMPERATURE: 97.3 F | DIASTOLIC BLOOD PRESSURE: 74 MMHG | SYSTOLIC BLOOD PRESSURE: 120 MMHG | HEART RATE: 83 BPM

## 2018-01-01 VITALS
TEMPERATURE: 97.2 F | DIASTOLIC BLOOD PRESSURE: 70 MMHG | SYSTOLIC BLOOD PRESSURE: 140 MMHG | OXYGEN SATURATION: 98 % | HEART RATE: 110 BPM

## 2018-01-01 VITALS
DIASTOLIC BLOOD PRESSURE: 60 MMHG | HEART RATE: 85 BPM | OXYGEN SATURATION: 98 % | SYSTOLIC BLOOD PRESSURE: 112 MMHG | TEMPERATURE: 99.7 F | RESPIRATION RATE: 20 BRPM

## 2018-01-01 VITALS
SYSTOLIC BLOOD PRESSURE: 110 MMHG | RESPIRATION RATE: 18 BRPM | TEMPERATURE: 97.9 F | OXYGEN SATURATION: 99 % | DIASTOLIC BLOOD PRESSURE: 80 MMHG | HEART RATE: 89 BPM

## 2018-01-01 VITALS
HEART RATE: 67 BPM | RESPIRATION RATE: 18 BRPM | TEMPERATURE: 97.6 F | OXYGEN SATURATION: 99 % | DIASTOLIC BLOOD PRESSURE: 60 MMHG | SYSTOLIC BLOOD PRESSURE: 120 MMHG

## 2018-01-01 VITALS — RESPIRATION RATE: 18 BRPM | SYSTOLIC BLOOD PRESSURE: 124 MMHG | DIASTOLIC BLOOD PRESSURE: 70 MMHG | HEART RATE: 76 BPM

## 2018-01-01 VITALS — HEART RATE: 76 BPM | RESPIRATION RATE: 16 BRPM | DIASTOLIC BLOOD PRESSURE: 72 MMHG | SYSTOLIC BLOOD PRESSURE: 124 MMHG

## 2018-01-01 VITALS — OXYGEN SATURATION: 97 %

## 2018-01-01 VITALS
RESPIRATION RATE: 20 BRPM | HEART RATE: 84 BPM | OXYGEN SATURATION: 99 % | DIASTOLIC BLOOD PRESSURE: 74 MMHG | SYSTOLIC BLOOD PRESSURE: 128 MMHG | TEMPERATURE: 97.9 F

## 2018-01-01 VITALS
OXYGEN SATURATION: 99 % | HEART RATE: 77 BPM | RESPIRATION RATE: 20 BRPM | SYSTOLIC BLOOD PRESSURE: 144 MMHG | TEMPERATURE: 98.6 F | DIASTOLIC BLOOD PRESSURE: 64 MMHG

## 2018-01-01 VITALS
DIASTOLIC BLOOD PRESSURE: 72 MMHG | OXYGEN SATURATION: 99 % | RESPIRATION RATE: 18 BRPM | TEMPERATURE: 99.5 F | SYSTOLIC BLOOD PRESSURE: 122 MMHG | HEART RATE: 83 BPM

## 2018-01-01 VITALS
SYSTOLIC BLOOD PRESSURE: 130 MMHG | RESPIRATION RATE: 20 BRPM | DIASTOLIC BLOOD PRESSURE: 70 MMHG | TEMPERATURE: 98.5 F | HEART RATE: 71 BPM | OXYGEN SATURATION: 99 %

## 2018-01-01 VITALS
DIASTOLIC BLOOD PRESSURE: 72 MMHG | SYSTOLIC BLOOD PRESSURE: 110 MMHG | HEART RATE: 86 BPM | OXYGEN SATURATION: 99 % | TEMPERATURE: 98.9 F

## 2018-01-01 VITALS — HEART RATE: 76 BPM | RESPIRATION RATE: 16 BRPM

## 2018-01-01 VITALS
SYSTOLIC BLOOD PRESSURE: 120 MMHG | OXYGEN SATURATION: 98 % | HEART RATE: 83 BPM | DIASTOLIC BLOOD PRESSURE: 70 MMHG | TEMPERATURE: 97.3 F

## 2018-01-01 VITALS
HEART RATE: 71 BPM | DIASTOLIC BLOOD PRESSURE: 80 MMHG | SYSTOLIC BLOOD PRESSURE: 120 MMHG | TEMPERATURE: 98.6 F | RESPIRATION RATE: 18 BRPM | OXYGEN SATURATION: 99 %

## 2018-01-01 VITALS
RESPIRATION RATE: 20 BRPM | DIASTOLIC BLOOD PRESSURE: 76 MMHG | HEART RATE: 78 BPM | OXYGEN SATURATION: 96 % | SYSTOLIC BLOOD PRESSURE: 128 MMHG

## 2018-01-01 VITALS
SYSTOLIC BLOOD PRESSURE: 100 MMHG | RESPIRATION RATE: 20 BRPM | HEART RATE: 92 BPM | DIASTOLIC BLOOD PRESSURE: 60 MMHG | OXYGEN SATURATION: 99 %

## 2018-01-01 VITALS
SYSTOLIC BLOOD PRESSURE: 110 MMHG | OXYGEN SATURATION: 98 % | TEMPERATURE: 98.9 F | DIASTOLIC BLOOD PRESSURE: 60 MMHG | RESPIRATION RATE: 18 BRPM | HEART RATE: 71 BPM

## 2018-01-01 DIAGNOSIS — Z97.8 CHRONIC INDWELLING FOLEY CATHETER: ICD-10-CM

## 2018-01-01 DIAGNOSIS — R53.81 DEBILITY: ICD-10-CM

## 2018-01-01 DIAGNOSIS — M81.8 OTHER OSTEOPOROSIS WITHOUT CURRENT PATHOLOGICAL FRACTURE: ICD-10-CM

## 2018-01-01 DIAGNOSIS — G62.9 NEUROPATHY: ICD-10-CM

## 2018-01-01 DIAGNOSIS — M54.9 INTRACTABLE BACK PAIN: ICD-10-CM

## 2018-01-01 DIAGNOSIS — N30.00 ACUTE CYSTITIS WITHOUT HEMATURIA: ICD-10-CM

## 2018-01-01 DIAGNOSIS — D52.0 DIETARY FOLATE DEFICIENCY ANEMIA: ICD-10-CM

## 2018-01-01 DIAGNOSIS — Z00.00 INITIAL MEDICARE ANNUAL WELLNESS VISIT: ICD-10-CM

## 2018-01-01 DIAGNOSIS — E63.9 NUTRITIONAL DEFICIENCY: ICD-10-CM

## 2018-01-01 DIAGNOSIS — J84.112 IPF (IDIOPATHIC PULMONARY FIBROSIS) (HCC): Primary | ICD-10-CM

## 2018-01-01 DIAGNOSIS — J84.9 INTERSTITIAL LUNG DISEASE (HCC): ICD-10-CM

## 2018-01-01 DIAGNOSIS — I10 ESSENTIAL HYPERTENSION: ICD-10-CM

## 2018-01-01 DIAGNOSIS — J84.10 PULMONARY FIBROSIS (HCC): ICD-10-CM

## 2018-01-01 DIAGNOSIS — R53.1 WEAKNESS: ICD-10-CM

## 2018-01-01 DIAGNOSIS — R53.83 FATIGUE, UNSPECIFIED TYPE: ICD-10-CM

## 2018-01-01 DIAGNOSIS — R63.4 WEIGHT LOSS: ICD-10-CM

## 2018-01-01 DIAGNOSIS — J84.10 PULMONARY FIBROSIS (HCC): Primary | ICD-10-CM

## 2018-01-01 DIAGNOSIS — M80.00XD AGE-RELATED OSTEOPOROSIS WITH CURRENT PATHOLOGICAL FRACTURE WITH ROUTINE HEALING: ICD-10-CM

## 2018-01-01 DIAGNOSIS — R06.00 DYSPNEA, UNSPECIFIED TYPE: ICD-10-CM

## 2018-01-01 DIAGNOSIS — I61.9 NONTRAUMATIC INTRACEREBRAL HEMORRHAGE, UNSPECIFIED CEREBRAL LOCATION, UNSPECIFIED LATERALITY (HCC): Primary | ICD-10-CM

## 2018-01-01 DIAGNOSIS — Z13.31 SCREENING FOR DEPRESSION: ICD-10-CM

## 2018-01-01 DIAGNOSIS — Z99.81 DEPENDENCE ON CONTINUOUS SUPPLEMENTAL OXYGEN: ICD-10-CM

## 2018-01-01 DIAGNOSIS — Z71.89 ADVANCE CARE PLANNING: Primary | ICD-10-CM

## 2018-01-01 DIAGNOSIS — J84.112 IPF (IDIOPATHIC PULMONARY FIBROSIS) (HCC): ICD-10-CM

## 2018-01-01 DIAGNOSIS — Z13.39 SCREENING FOR ALCOHOLISM: ICD-10-CM

## 2018-01-01 DIAGNOSIS — S32.040S CLOSED COMPRESSION FRACTURE OF FOURTH LUMBAR VERTEBRA, SEQUELA: Primary | ICD-10-CM

## 2018-01-01 DIAGNOSIS — R68.89 OTHER GENERAL SYMPTOMS AND SIGNS: ICD-10-CM

## 2018-01-01 DIAGNOSIS — S32.040S CLOSED COMPRESSION FRACTURE OF FOURTH LUMBAR VERTEBRA, SEQUELA: ICD-10-CM

## 2018-01-01 DIAGNOSIS — G30.1 LATE ONSET ALZHEIMER'S DISEASE WITHOUT BEHAVIORAL DISTURBANCE (HCC): ICD-10-CM

## 2018-01-01 DIAGNOSIS — D64.9 ANEMIA, UNSPECIFIED TYPE: ICD-10-CM

## 2018-01-01 DIAGNOSIS — J84.9 INTERSTITIAL LUNG DISEASE (HCC): Primary | ICD-10-CM

## 2018-01-01 DIAGNOSIS — Z99.81 DEPENDENCE ON SUPPLEMENTAL OXYGEN: ICD-10-CM

## 2018-01-01 DIAGNOSIS — E43 SEVERE PROTEIN-CALORIE MALNUTRITION (HCC): ICD-10-CM

## 2018-01-01 DIAGNOSIS — Z87.442 H/O RENAL CALCULI: ICD-10-CM

## 2018-01-01 DIAGNOSIS — E55.9 VITAMIN D DEFICIENCY: Primary | ICD-10-CM

## 2018-01-01 DIAGNOSIS — R53.1 WEAKNESS GENERALIZED: ICD-10-CM

## 2018-01-01 DIAGNOSIS — Z97.8 CHRONIC INDWELLING FOLEY CATHETER: Primary | ICD-10-CM

## 2018-01-01 DIAGNOSIS — F02.80 LATE ONSET ALZHEIMER'S DISEASE WITHOUT BEHAVIORAL DISTURBANCE (HCC): ICD-10-CM

## 2018-01-01 DIAGNOSIS — Z99.81 REQUIRES OXYGEN THERAPY: ICD-10-CM

## 2018-01-01 DIAGNOSIS — D64.9 ANEMIA, UNSPECIFIED TYPE: Primary | ICD-10-CM

## 2018-01-01 LAB
25(OH)D3+25(OH)D2 SERPL-MCNC: 21.9 NG/ML (ref 30–100)
ALBUMIN SERPL-MCNC: 3.3 G/DL (ref 3.5–4.7)
ALBUMIN SERPL-MCNC: 3.7 G/DL (ref 3.5–4.7)
ALBUMIN/GLOB SERPL: 1.1 {RATIO} (ref 1.2–2.2)
ALBUMIN/GLOB SERPL: 1.2 {RATIO} (ref 1.2–2.2)
ALP SERPL-CCNC: 105 IU/L (ref 39–117)
ALP SERPL-CCNC: 122 IU/L (ref 39–117)
ALT SERPL-CCNC: 11 IU/L (ref 0–32)
ALT SERPL-CCNC: 17 IU/L (ref 0–32)
ANA SER QL: NEGATIVE
AST SERPL-CCNC: 29 IU/L (ref 0–40)
AST SERPL-CCNC: 53 IU/L (ref 0–40)
BASOPHILS # BLD AUTO: 0.1 X10E3/UL (ref 0–0.2)
BASOPHILS # BLD AUTO: 0.1 X10E3/UL (ref 0–0.2)
BASOPHILS NFR BLD AUTO: 1 %
BASOPHILS NFR BLD AUTO: 1 %
BILIRUB SERPL-MCNC: 0.5 MG/DL (ref 0–1.2)
BILIRUB SERPL-MCNC: 0.7 MG/DL (ref 0–1.2)
BUN SERPL-MCNC: 11 MG/DL (ref 8–27)
BUN SERPL-MCNC: 11 MG/DL (ref 8–27)
BUN SERPL-MCNC: 12 MG/DL (ref 8–27)
BUN/CREAT SERPL: 14 (ref 12–28)
BUN/CREAT SERPL: 17 (ref 12–28)
BUN/CREAT SERPL: 19 (ref 12–28)
CALCIUM SERPL-MCNC: 8.1 MG/DL (ref 8.7–10.3)
CALCIUM SERPL-MCNC: 8.4 MG/DL (ref 8.7–10.3)
CALCIUM SERPL-MCNC: 8.7 MG/DL (ref 8.7–10.3)
CHLORIDE SERPL-SCNC: 101 MMOL/L (ref 96–106)
CHLORIDE SERPL-SCNC: 102 MMOL/L (ref 96–106)
CHLORIDE SERPL-SCNC: 99 MMOL/L (ref 96–106)
CO2 SERPL-SCNC: 16 MMOL/L (ref 20–29)
CO2 SERPL-SCNC: 24 MMOL/L (ref 20–29)
CO2 SERPL-SCNC: 26 MMOL/L (ref 20–29)
CREAT SERPL-MCNC: 0.62 MG/DL (ref 0.57–1)
CREAT SERPL-MCNC: 0.66 MG/DL (ref 0.57–1)
CREAT SERPL-MCNC: 0.79 MG/DL (ref 0.57–1)
EOSINOPHIL # BLD AUTO: 0.4 X10E3/UL (ref 0–0.4)
EOSINOPHIL # BLD AUTO: 0.4 X10E3/UL (ref 0–0.4)
EOSINOPHIL NFR BLD AUTO: 5 %
EOSINOPHIL NFR BLD AUTO: 5 %
ERYTHROCYTE [DISTWIDTH] IN BLOOD BY AUTOMATED COUNT: 16.2 % (ref 12.3–15.4)
ERYTHROCYTE [DISTWIDTH] IN BLOOD BY AUTOMATED COUNT: 16.9 % (ref 12.3–15.4)
ERYTHROCYTE [DISTWIDTH] IN BLOOD BY AUTOMATED COUNT: 18.4 % (ref 12.3–15.4)
ERYTHROCYTE [SEDIMENTATION RATE] IN BLOOD BY WESTERGREN METHOD: 31 MM/HR (ref 0–40)
FOLATE SERPL-MCNC: 11.7 NG/ML
GLOBULIN SER CALC-MCNC: 2.8 G/DL (ref 1.5–4.5)
GLOBULIN SER CALC-MCNC: 3.5 G/DL (ref 1.5–4.5)
GLUCOSE SERPL-MCNC: 135 MG/DL (ref 65–99)
GLUCOSE SERPL-MCNC: 154 MG/DL (ref 65–99)
GLUCOSE SERPL-MCNC: 76 MG/DL (ref 65–99)
HCT VFR BLD AUTO: 31 % (ref 34–46.6)
HCT VFR BLD AUTO: 31.5 % (ref 34–46.6)
HCT VFR BLD AUTO: 37.4 % (ref 34–46.6)
HGB BLD-MCNC: 11.6 G/DL (ref 11.1–15.9)
HGB BLD-MCNC: 9.8 G/DL (ref 11.1–15.9)
HGB BLD-MCNC: 9.9 G/DL (ref 11.1–15.9)
IMM GRANULOCYTES # BLD: 0 X10E3/UL (ref 0–0.1)
IMM GRANULOCYTES # BLD: 0 X10E3/UL (ref 0–0.1)
IMM GRANULOCYTES NFR BLD: 0 %
IMM GRANULOCYTES NFR BLD: 0 %
IRON SATN MFR SERPL: 8 % (ref 15–55)
IRON SERPL-MCNC: 26 UG/DL (ref 27–139)
LYMPHOCYTES # BLD AUTO: 0.9 X10E3/UL (ref 0.7–3.1)
LYMPHOCYTES # BLD AUTO: 0.9 X10E3/UL (ref 0.7–3.1)
LYMPHOCYTES NFR BLD AUTO: 10 %
LYMPHOCYTES NFR BLD AUTO: 11 %
MCH RBC QN AUTO: 22.4 PG (ref 26.6–33)
MCH RBC QN AUTO: 23.4 PG (ref 26.6–33)
MCH RBC QN AUTO: 23.5 PG (ref 26.6–33)
MCHC RBC AUTO-ENTMCNC: 31 G/DL (ref 31.5–35.7)
MCHC RBC AUTO-ENTMCNC: 31.1 G/DL (ref 31.5–35.7)
MCHC RBC AUTO-ENTMCNC: 31.9 G/DL (ref 31.5–35.7)
MCV RBC AUTO: 72 FL (ref 79–97)
MCV RBC AUTO: 74 FL (ref 79–97)
MCV RBC AUTO: 75 FL (ref 79–97)
MONOCYTES # BLD AUTO: 0.7 X10E3/UL (ref 0.1–0.9)
MONOCYTES # BLD AUTO: 0.8 X10E3/UL (ref 0.1–0.9)
MONOCYTES NFR BLD AUTO: 8 %
MONOCYTES NFR BLD AUTO: 9 %
NEUTROPHILS # BLD AUTO: 6 X10E3/UL (ref 1.4–7)
NEUTROPHILS # BLD AUTO: 6.5 X10E3/UL (ref 1.4–7)
NEUTROPHILS NFR BLD AUTO: 74 %
NEUTROPHILS NFR BLD AUTO: 76 %
PLATELET # BLD AUTO: 244 X10E3/UL (ref 150–379)
PLATELET # BLD AUTO: 305 X10E3/UL (ref 150–379)
PLATELET # BLD AUTO: 340 X10E3/UL (ref 150–379)
POTASSIUM SERPL-SCNC: 3.7 MMOL/L (ref 3.5–5.2)
POTASSIUM SERPL-SCNC: 4 MMOL/L (ref 3.5–5.2)
POTASSIUM SERPL-SCNC: 4.2 MMOL/L (ref 3.5–5.2)
PROT SERPL-MCNC: 6.1 G/DL (ref 6–8.5)
PROT SERPL-MCNC: 7.2 G/DL (ref 6–8.5)
RBC # BLD AUTO: 4.19 X10E6/UL (ref 3.77–5.28)
RBC # BLD AUTO: 4.22 X10E6/UL (ref 3.77–5.28)
RBC # BLD AUTO: 5.17 X10E6/UL (ref 3.77–5.28)
SODIUM SERPL-SCNC: 141 MMOL/L (ref 134–144)
SODIUM SERPL-SCNC: 141 MMOL/L (ref 134–144)
SODIUM SERPL-SCNC: 143 MMOL/L (ref 134–144)
TIBC SERPL-MCNC: 315 UG/DL (ref 250–450)
TSH SERPL DL<=0.005 MIU/L-ACNC: 2.46 UIU/ML (ref 0.45–4.5)
UIBC SERPL-MCNC: 289 UG/DL (ref 118–369)
VIT B12 SERPL-MCNC: 849 PG/ML (ref 232–1245)
VIT B12 SERPL-MCNC: 969 PG/ML (ref 232–1245)
WBC # BLD AUTO: 8.1 X10E3/UL (ref 3.4–10.8)
WBC # BLD AUTO: 8.5 X10E3/UL (ref 3.4–10.8)
WBC # BLD AUTO: 9.2 X10E3/UL (ref 3.4–10.8)

## 2018-01-01 PROCEDURE — 3331090002 HH PPS REVENUE DEBIT

## 2018-01-01 PROCEDURE — G0299 HHS/HOSPICE OF RN EA 15 MIN: HCPCS

## 2018-01-01 PROCEDURE — 3331090001 HH PPS REVENUE CREDIT

## 2018-01-01 PROCEDURE — A4333 URINARY CATH ANCHOR DEVICE: HCPCS

## 2018-01-01 PROCEDURE — A4649 SURGICAL SUPPLIES: HCPCS

## 2018-01-01 PROCEDURE — G0156 HHCP-SVS OF AIDE,EA 15 MIN: HCPCS

## 2018-01-01 PROCEDURE — G0151 HHCP-SERV OF PT,EA 15 MIN: HCPCS

## 2018-01-01 PROCEDURE — 82607 VITAMIN B-12: CPT

## 2018-01-01 PROCEDURE — 0651 HSPC ROUTINE HOME CARE

## 2018-01-01 PROCEDURE — 3331090003 HH PPS REVENUE ADJ

## 2018-01-01 PROCEDURE — A4354 CATH INSERTION TRAY W/BAG: HCPCS

## 2018-01-01 PROCEDURE — T4541 LARGE DISPOSABLE UNDERPAD: HCPCS

## 2018-01-01 PROCEDURE — A4344 CATH INDW FOLEY 2 WAY SILICN: HCPCS

## 2018-01-01 PROCEDURE — A4338 INDWELLING CATHETER LATEX: HCPCS

## 2018-01-01 PROCEDURE — G0300 HHS/HOSPICE OF LPN EA 15 MIN: HCPCS

## 2018-01-01 PROCEDURE — A4216 STERILE WATER/SALINE, 10 ML: HCPCS

## 2018-01-01 PROCEDURE — A9270 NON-COVERED ITEM OR SERVICE: HCPCS

## 2018-01-01 PROCEDURE — A4357 BEDSIDE DRAINAGE BAG: HCPCS

## 2018-01-01 PROCEDURE — 400014 HH F/U

## 2018-01-01 PROCEDURE — A4657 SYRINGE W/WO NEEDLE: HCPCS

## 2018-01-01 PROCEDURE — 85651 RBC SED RATE NONAUTOMATED: CPT

## 2018-01-01 PROCEDURE — 80053 COMPREHEN METABOLIC PANEL: CPT

## 2018-01-01 PROCEDURE — A4320 IRRIGATION TRAY: HCPCS

## 2018-01-01 PROCEDURE — G0155 HHCP-SVS OF CSW,EA 15 MIN: HCPCS

## 2018-01-01 PROCEDURE — 84443 ASSAY THYROID STIM HORMONE: CPT

## 2018-01-01 PROCEDURE — HOSPICE MEDICATION HC HH HOSPICE MEDICATION

## 2018-01-01 PROCEDURE — G0152 HHCP-SERV OF OT,EA 15 MIN: HCPCS

## 2018-01-01 PROCEDURE — 85025 COMPLETE CBC W/AUTO DIFF WBC: CPT

## 2018-01-01 PROCEDURE — A4452 WATERPROOF TAPE: HCPCS

## 2018-01-01 PROCEDURE — HHS10554 SHAMPOO/BODY WASH 8 OZ ALOE VESTA

## 2018-01-01 PROCEDURE — A4927 NON-STERILE GLOVES: HCPCS

## 2018-01-01 PROCEDURE — 82746 ASSAY OF FOLIC ACID SERUM: CPT

## 2018-01-01 PROCEDURE — A6250 SKIN SEAL PROTECT MOISTURIZR: HCPCS

## 2018-01-01 PROCEDURE — 86038 ANTINUCLEAR ANTIBODIES: CPT

## 2018-01-01 PROCEDURE — 82306 VITAMIN D 25 HYDROXY: CPT

## 2018-01-01 PROCEDURE — 3336500001 HSPC ELECTION

## 2018-01-01 PROCEDURE — 80048 BASIC METABOLIC PNL TOTAL CA: CPT

## 2018-01-01 PROCEDURE — G0153 HHCP-SVS OF S/L PATH,EA 15MN: HCPCS

## 2018-01-01 PROCEDURE — A4247 BETADINE/IODINE SWABS/WIPES: HCPCS

## 2018-01-01 PROCEDURE — A5120 SKIN BARRIER, WIPE OR SWAB: HCPCS

## 2018-01-01 PROCEDURE — A4314 CATH W/DRAINAGE 2-WAY LATEX: HCPCS

## 2018-01-01 PROCEDURE — 83550 IRON BINDING TEST: CPT

## 2018-01-01 PROCEDURE — T4526 ADULT SIZE PULL-ON MED: HCPCS

## 2018-01-01 PROCEDURE — 85027 COMPLETE CBC AUTOMATED: CPT

## 2018-01-01 PROCEDURE — A4310 INSERT TRAY W/O BAG/CATH: HCPCS

## 2018-01-01 RX ORDER — IBUPROFEN 200 MG
TABLET ORAL
COMMUNITY
End: 2018-01-01 | Stop reason: CLARIF

## 2018-01-01 RX ORDER — AMOXICILLIN AND CLAVULANATE POTASSIUM 875; 125 MG/1; MG/1
1 TABLET, FILM COATED ORAL EVERY 12 HOURS
Qty: 20 TAB | Refills: 0 | Status: SHIPPED | OUTPATIENT
Start: 2018-01-01 | End: 2018-01-01

## 2018-01-01 RX ORDER — IPRATROPIUM BROMIDE AND ALBUTEROL SULFATE 2.5; .5 MG/3ML; MG/3ML
3 SOLUTION RESPIRATORY (INHALATION) 2 TIMES DAILY
Qty: 60 NEBULE | Refills: 2 | Status: SHIPPED | OUTPATIENT
Start: 2018-01-01 | End: 2018-01-01 | Stop reason: SDUPTHER

## 2018-01-01 RX ORDER — AMLODIPINE BESYLATE 5 MG/1
5 TABLET ORAL DAILY
Qty: 30 TAB | Refills: 6 | Status: SHIPPED | OUTPATIENT
Start: 2018-01-01 | End: 2018-01-01 | Stop reason: SDUPTHER

## 2018-01-01 RX ORDER — IPRATROPIUM BROMIDE AND ALBUTEROL SULFATE 2.5; .5 MG/3ML; MG/3ML
SOLUTION RESPIRATORY (INHALATION)
Qty: 540 ML | Refills: 2 | Status: SHIPPED | OUTPATIENT
Start: 2018-01-01 | End: 2018-01-01

## 2018-01-01 RX ORDER — MELATONIN
2000 DAILY
Qty: 30 TAB | Refills: 11
Start: 2018-01-01 | End: 2019-01-01

## 2018-01-01 RX ORDER — GABAPENTIN 100 MG/1
100 CAPSULE ORAL 3 TIMES DAILY
Qty: 270 CAP | Refills: 2 | Status: SHIPPED | OUTPATIENT
Start: 2018-01-01 | End: 2018-01-01

## 2018-01-01 RX ORDER — AMLODIPINE BESYLATE 5 MG/1
TABLET ORAL
Qty: 90 TAB | Refills: 6 | Status: SHIPPED | OUTPATIENT
Start: 2018-01-01 | End: 2018-01-01

## 2018-01-01 RX ORDER — OMEPRAZOLE 20 MG/1
20 CAPSULE, DELAYED RELEASE ORAL DAILY
Qty: 90 CAP | Refills: 3 | Status: SHIPPED | OUTPATIENT
Start: 2018-01-01 | End: 2018-01-01

## 2018-01-02 PROCEDURE — 3331090002 HH PPS REVENUE DEBIT

## 2018-01-02 PROCEDURE — 3331090001 HH PPS REVENUE CREDIT

## 2018-01-03 ENCOUNTER — HOME CARE VISIT (OUTPATIENT)
Dept: SCHEDULING | Facility: HOME HEALTH | Age: 83
End: 2018-01-03
Payer: MEDICARE

## 2018-01-03 PROCEDURE — G0299 HHS/HOSPICE OF RN EA 15 MIN: HCPCS

## 2018-01-03 PROCEDURE — 3331090001 HH PPS REVENUE CREDIT

## 2018-01-03 PROCEDURE — 3331090002 HH PPS REVENUE DEBIT

## 2018-01-04 VITALS
RESPIRATION RATE: 18 BRPM | OXYGEN SATURATION: 99 % | DIASTOLIC BLOOD PRESSURE: 68 MMHG | HEART RATE: 89 BPM | TEMPERATURE: 98.2 F | SYSTOLIC BLOOD PRESSURE: 140 MMHG

## 2018-01-04 PROCEDURE — 3331090001 HH PPS REVENUE CREDIT

## 2018-01-04 PROCEDURE — 3331090002 HH PPS REVENUE DEBIT

## 2018-01-05 PROCEDURE — 3331090002 HH PPS REVENUE DEBIT

## 2018-01-05 PROCEDURE — 3331090001 HH PPS REVENUE CREDIT

## 2018-01-06 PROCEDURE — 3331090002 HH PPS REVENUE DEBIT

## 2018-01-06 PROCEDURE — 3331090001 HH PPS REVENUE CREDIT

## 2018-01-07 PROCEDURE — 3331090002 HH PPS REVENUE DEBIT

## 2018-01-07 PROCEDURE — 3331090001 HH PPS REVENUE CREDIT

## 2018-01-08 PROCEDURE — 3331090001 HH PPS REVENUE CREDIT

## 2018-01-08 PROCEDURE — 3331090002 HH PPS REVENUE DEBIT

## 2018-01-09 PROCEDURE — 3331090001 HH PPS REVENUE CREDIT

## 2018-01-09 PROCEDURE — 3331090002 HH PPS REVENUE DEBIT

## 2018-01-10 ENCOUNTER — HOME CARE VISIT (OUTPATIENT)
Dept: SCHEDULING | Facility: HOME HEALTH | Age: 83
End: 2018-01-10
Payer: MEDICARE

## 2018-01-10 VITALS
SYSTOLIC BLOOD PRESSURE: 110 MMHG | DIASTOLIC BLOOD PRESSURE: 70 MMHG | TEMPERATURE: 98.5 F | RESPIRATION RATE: 18 BRPM | OXYGEN SATURATION: 98 % | HEART RATE: 87 BPM

## 2018-01-10 PROCEDURE — 3331090001 HH PPS REVENUE CREDIT

## 2018-01-10 PROCEDURE — G0299 HHS/HOSPICE OF RN EA 15 MIN: HCPCS

## 2018-01-10 PROCEDURE — 3331090002 HH PPS REVENUE DEBIT

## 2018-01-11 PROCEDURE — 3331090001 HH PPS REVENUE CREDIT

## 2018-01-11 PROCEDURE — 3331090002 HH PPS REVENUE DEBIT

## 2018-01-12 PROCEDURE — 3331090002 HH PPS REVENUE DEBIT

## 2018-01-12 PROCEDURE — 3331090001 HH PPS REVENUE CREDIT

## 2018-01-13 PROCEDURE — 3331090001 HH PPS REVENUE CREDIT

## 2018-01-13 PROCEDURE — 3331090002 HH PPS REVENUE DEBIT

## 2018-01-14 PROCEDURE — 3331090002 HH PPS REVENUE DEBIT

## 2018-01-14 PROCEDURE — 3331090001 HH PPS REVENUE CREDIT

## 2018-01-15 PROCEDURE — 3331090002 HH PPS REVENUE DEBIT

## 2018-01-15 PROCEDURE — 3331090001 HH PPS REVENUE CREDIT

## 2018-01-16 PROCEDURE — 3331090002 HH PPS REVENUE DEBIT

## 2018-01-16 PROCEDURE — 3331090001 HH PPS REVENUE CREDIT

## 2018-01-17 PROCEDURE — 3331090002 HH PPS REVENUE DEBIT

## 2018-01-17 PROCEDURE — 3331090001 HH PPS REVENUE CREDIT

## 2018-01-18 ENCOUNTER — HOME CARE VISIT (OUTPATIENT)
Dept: SCHEDULING | Facility: HOME HEALTH | Age: 83
End: 2018-01-18
Payer: MEDICARE

## 2018-01-18 VITALS
RESPIRATION RATE: 18 BRPM | OXYGEN SATURATION: 99 % | SYSTOLIC BLOOD PRESSURE: 120 MMHG | TEMPERATURE: 97.7 F | DIASTOLIC BLOOD PRESSURE: 80 MMHG | HEART RATE: 69 BPM

## 2018-01-18 PROCEDURE — 3331090001 HH PPS REVENUE CREDIT

## 2018-01-18 PROCEDURE — 3331090002 HH PPS REVENUE DEBIT

## 2018-01-18 PROCEDURE — G0299 HHS/HOSPICE OF RN EA 15 MIN: HCPCS

## 2018-01-19 PROCEDURE — 3331090001 HH PPS REVENUE CREDIT

## 2018-01-19 PROCEDURE — 3331090002 HH PPS REVENUE DEBIT

## 2018-01-20 PROCEDURE — 3331090002 HH PPS REVENUE DEBIT

## 2018-01-20 PROCEDURE — 3331090001 HH PPS REVENUE CREDIT

## 2018-01-21 PROCEDURE — 3331090001 HH PPS REVENUE CREDIT

## 2018-01-21 PROCEDURE — 3331090002 HH PPS REVENUE DEBIT

## 2018-01-22 PROCEDURE — 3331090001 HH PPS REVENUE CREDIT

## 2018-01-22 PROCEDURE — 3331090002 HH PPS REVENUE DEBIT

## 2018-01-23 PROCEDURE — 3331090001 HH PPS REVENUE CREDIT

## 2018-01-23 PROCEDURE — 3331090002 HH PPS REVENUE DEBIT

## 2018-01-24 PROCEDURE — 3331090001 HH PPS REVENUE CREDIT

## 2018-01-24 PROCEDURE — 3331090002 HH PPS REVENUE DEBIT

## 2018-01-25 PROCEDURE — 3331090001 HH PPS REVENUE CREDIT

## 2018-01-25 PROCEDURE — 3331090002 HH PPS REVENUE DEBIT

## 2018-01-26 ENCOUNTER — HOME CARE VISIT (OUTPATIENT)
Dept: HOME HEALTH SERVICES | Facility: HOME HEALTH | Age: 83
End: 2018-01-26
Payer: MEDICARE

## 2018-01-26 PROCEDURE — G0300 HHS/HOSPICE OF LPN EA 15 MIN: HCPCS

## 2018-01-26 PROCEDURE — 3331090002 HH PPS REVENUE DEBIT

## 2018-01-26 PROCEDURE — 3331090001 HH PPS REVENUE CREDIT

## 2018-01-27 PROCEDURE — 3331090001 HH PPS REVENUE CREDIT

## 2018-01-27 PROCEDURE — 3331090002 HH PPS REVENUE DEBIT

## 2018-01-28 PROCEDURE — 3331090001 HH PPS REVENUE CREDIT

## 2018-01-28 PROCEDURE — 3331090002 HH PPS REVENUE DEBIT

## 2018-01-29 PROCEDURE — 3331090001 HH PPS REVENUE CREDIT

## 2018-01-29 PROCEDURE — 3331090002 HH PPS REVENUE DEBIT

## 2018-01-30 ENCOUNTER — HOME CARE VISIT (OUTPATIENT)
Dept: SCHEDULING | Facility: HOME HEALTH | Age: 83
End: 2018-01-30
Payer: MEDICARE

## 2018-01-30 PROCEDURE — G0299 HHS/HOSPICE OF RN EA 15 MIN: HCPCS

## 2018-01-30 PROCEDURE — 3331090001 HH PPS REVENUE CREDIT

## 2018-01-30 PROCEDURE — 3331090002 HH PPS REVENUE DEBIT

## 2018-01-31 VITALS
TEMPERATURE: 98.3 F | DIASTOLIC BLOOD PRESSURE: 70 MMHG | HEART RATE: 85 BPM | RESPIRATION RATE: 18 BRPM | OXYGEN SATURATION: 99 % | SYSTOLIC BLOOD PRESSURE: 130 MMHG

## 2018-01-31 PROCEDURE — 3331090002 HH PPS REVENUE DEBIT

## 2018-01-31 PROCEDURE — 3331090001 HH PPS REVENUE CREDIT

## 2018-02-01 PROCEDURE — 3331090001 HH PPS REVENUE CREDIT

## 2018-02-01 PROCEDURE — 3331090002 HH PPS REVENUE DEBIT

## 2018-02-02 PROCEDURE — 3331090002 HH PPS REVENUE DEBIT

## 2018-02-02 PROCEDURE — 3331090001 HH PPS REVENUE CREDIT

## 2018-02-03 PROCEDURE — 3331090001 HH PPS REVENUE CREDIT

## 2018-02-03 PROCEDURE — 3331090002 HH PPS REVENUE DEBIT

## 2018-02-04 PROCEDURE — 3331090001 HH PPS REVENUE CREDIT

## 2018-02-04 PROCEDURE — 3331090002 HH PPS REVENUE DEBIT

## 2018-02-05 PROCEDURE — 3331090002 HH PPS REVENUE DEBIT

## 2018-02-05 PROCEDURE — 3331090001 HH PPS REVENUE CREDIT

## 2018-02-06 ENCOUNTER — HOME CARE VISIT (OUTPATIENT)
Dept: SCHEDULING | Facility: HOME HEALTH | Age: 83
End: 2018-02-06
Payer: MEDICARE

## 2018-02-06 PROCEDURE — G0299 HHS/HOSPICE OF RN EA 15 MIN: HCPCS

## 2018-02-06 PROCEDURE — 3331090001 HH PPS REVENUE CREDIT

## 2018-02-06 PROCEDURE — 3331090002 HH PPS REVENUE DEBIT

## 2018-02-07 VITALS
RESPIRATION RATE: 18 BRPM | TEMPERATURE: 97.6 F | HEART RATE: 108 BPM | SYSTOLIC BLOOD PRESSURE: 120 MMHG | OXYGEN SATURATION: 90 % | DIASTOLIC BLOOD PRESSURE: 72 MMHG

## 2018-02-07 PROCEDURE — 3331090002 HH PPS REVENUE DEBIT

## 2018-02-07 PROCEDURE — 3331090001 HH PPS REVENUE CREDIT

## 2018-02-08 PROCEDURE — 3331090002 HH PPS REVENUE DEBIT

## 2018-02-08 PROCEDURE — 3331090001 HH PPS REVENUE CREDIT

## 2018-02-09 PROCEDURE — 3331090001 HH PPS REVENUE CREDIT

## 2018-02-09 PROCEDURE — 3331090002 HH PPS REVENUE DEBIT

## 2018-02-10 PROCEDURE — 3331090001 HH PPS REVENUE CREDIT

## 2018-02-10 PROCEDURE — 3331090002 HH PPS REVENUE DEBIT

## 2018-02-11 PROCEDURE — 3331090002 HH PPS REVENUE DEBIT

## 2018-02-11 PROCEDURE — 3331090001 HH PPS REVENUE CREDIT

## 2018-02-12 ENCOUNTER — HOME CARE VISIT (OUTPATIENT)
Dept: SCHEDULING | Facility: HOME HEALTH | Age: 83
End: 2018-02-12
Payer: MEDICARE

## 2018-02-12 VITALS
DIASTOLIC BLOOD PRESSURE: 72 MMHG | SYSTOLIC BLOOD PRESSURE: 138 MMHG | HEART RATE: 78 BPM | RESPIRATION RATE: 18 BRPM | OXYGEN SATURATION: 99 % | TEMPERATURE: 97.7 F

## 2018-02-12 PROCEDURE — 3331090002 HH PPS REVENUE DEBIT

## 2018-02-12 PROCEDURE — 3331090001 HH PPS REVENUE CREDIT

## 2018-02-12 PROCEDURE — G0299 HHS/HOSPICE OF RN EA 15 MIN: HCPCS

## 2018-02-13 PROCEDURE — 3331090002 HH PPS REVENUE DEBIT

## 2018-02-13 PROCEDURE — 3331090001 HH PPS REVENUE CREDIT

## 2018-02-14 PROCEDURE — 3331090001 HH PPS REVENUE CREDIT

## 2018-02-14 PROCEDURE — 3331090002 HH PPS REVENUE DEBIT

## 2018-02-15 PROCEDURE — 3331090001 HH PPS REVENUE CREDIT

## 2018-02-15 PROCEDURE — 3331090002 HH PPS REVENUE DEBIT

## 2018-02-16 PROCEDURE — 3331090002 HH PPS REVENUE DEBIT

## 2018-02-16 PROCEDURE — 3331090001 HH PPS REVENUE CREDIT

## 2018-02-17 PROCEDURE — 3331090002 HH PPS REVENUE DEBIT

## 2018-02-17 PROCEDURE — 3331090001 HH PPS REVENUE CREDIT

## 2018-02-18 PROCEDURE — 3331090001 HH PPS REVENUE CREDIT

## 2018-02-18 PROCEDURE — 3331090002 HH PPS REVENUE DEBIT

## 2018-02-19 VITALS
OXYGEN SATURATION: 96 % | RESPIRATION RATE: 18 BRPM | HEART RATE: 90 BPM | DIASTOLIC BLOOD PRESSURE: 90 MMHG | TEMPERATURE: 98.9 F | SYSTOLIC BLOOD PRESSURE: 120 MMHG

## 2018-02-19 PROCEDURE — 3331090002 HH PPS REVENUE DEBIT

## 2018-02-19 PROCEDURE — 3331090001 HH PPS REVENUE CREDIT

## 2018-02-20 PROCEDURE — 3331090002 HH PPS REVENUE DEBIT

## 2018-02-20 PROCEDURE — 3331090001 HH PPS REVENUE CREDIT

## 2018-02-21 ENCOUNTER — HOME CARE VISIT (OUTPATIENT)
Dept: SCHEDULING | Facility: HOME HEALTH | Age: 83
End: 2018-02-21
Payer: MEDICARE

## 2018-02-21 VITALS
SYSTOLIC BLOOD PRESSURE: 140 MMHG | DIASTOLIC BLOOD PRESSURE: 80 MMHG | TEMPERATURE: 98 F | RESPIRATION RATE: 18 BRPM | OXYGEN SATURATION: 95 % | HEART RATE: 91 BPM

## 2018-02-21 PROCEDURE — 400014 HH F/U

## 2018-02-21 PROCEDURE — G0299 HHS/HOSPICE OF RN EA 15 MIN: HCPCS

## 2018-02-21 PROCEDURE — 3331090001 HH PPS REVENUE CREDIT

## 2018-02-21 PROCEDURE — 3331090002 HH PPS REVENUE DEBIT

## 2018-02-22 PROCEDURE — 3331090002 HH PPS REVENUE DEBIT

## 2018-02-22 PROCEDURE — 3331090001 HH PPS REVENUE CREDIT

## 2018-02-23 PROCEDURE — 3331090002 HH PPS REVENUE DEBIT

## 2018-02-23 PROCEDURE — 3331090001 HH PPS REVENUE CREDIT

## 2018-02-24 PROCEDURE — 3331090002 HH PPS REVENUE DEBIT

## 2018-02-24 PROCEDURE — 3331090001 HH PPS REVENUE CREDIT

## 2018-02-25 PROCEDURE — 3331090002 HH PPS REVENUE DEBIT

## 2018-02-25 PROCEDURE — 3331090001 HH PPS REVENUE CREDIT

## 2018-02-26 PROCEDURE — 3331090002 HH PPS REVENUE DEBIT

## 2018-02-26 PROCEDURE — 3331090001 HH PPS REVENUE CREDIT

## 2018-02-27 PROCEDURE — 3331090002 HH PPS REVENUE DEBIT

## 2018-02-27 PROCEDURE — 3331090001 HH PPS REVENUE CREDIT

## 2018-02-28 ENCOUNTER — HOME CARE VISIT (OUTPATIENT)
Dept: SCHEDULING | Facility: HOME HEALTH | Age: 83
End: 2018-02-28
Payer: MEDICARE

## 2018-02-28 VITALS
DIASTOLIC BLOOD PRESSURE: 70 MMHG | RESPIRATION RATE: 18 BRPM | TEMPERATURE: 98.1 F | OXYGEN SATURATION: 98 % | SYSTOLIC BLOOD PRESSURE: 120 MMHG | HEART RATE: 91 BPM

## 2018-02-28 PROCEDURE — 3331090001 HH PPS REVENUE CREDIT

## 2018-02-28 PROCEDURE — G0299 HHS/HOSPICE OF RN EA 15 MIN: HCPCS

## 2018-02-28 PROCEDURE — 3331090002 HH PPS REVENUE DEBIT

## 2018-03-01 PROCEDURE — 3331090002 HH PPS REVENUE DEBIT

## 2018-03-01 PROCEDURE — 3331090001 HH PPS REVENUE CREDIT

## 2018-03-02 PROCEDURE — 3331090002 HH PPS REVENUE DEBIT

## 2018-03-02 PROCEDURE — 3331090001 HH PPS REVENUE CREDIT

## 2018-03-03 PROCEDURE — 3331090002 HH PPS REVENUE DEBIT

## 2018-03-03 PROCEDURE — 3331090001 HH PPS REVENUE CREDIT

## 2018-03-04 PROCEDURE — 3331090002 HH PPS REVENUE DEBIT

## 2018-03-04 PROCEDURE — 3331090001 HH PPS REVENUE CREDIT

## 2018-03-05 PROCEDURE — 3331090001 HH PPS REVENUE CREDIT

## 2018-03-05 PROCEDURE — 3331090002 HH PPS REVENUE DEBIT

## 2018-03-06 PROCEDURE — 3331090001 HH PPS REVENUE CREDIT

## 2018-03-06 PROCEDURE — 3331090002 HH PPS REVENUE DEBIT

## 2018-03-07 ENCOUNTER — HOME CARE VISIT (OUTPATIENT)
Dept: SCHEDULING | Facility: HOME HEALTH | Age: 83
End: 2018-03-07
Payer: MEDICARE

## 2018-03-07 PROCEDURE — G0300 HHS/HOSPICE OF LPN EA 15 MIN: HCPCS

## 2018-03-07 PROCEDURE — 3331090002 HH PPS REVENUE DEBIT

## 2018-03-07 PROCEDURE — 3331090001 HH PPS REVENUE CREDIT

## 2018-05-31 NOTE — TELEPHONE ENCOUNTER
Nurse returned call to [facility] with CHARLY Zee regarding inquiry about a Home Based Primary Care referral for pt. [facility] says pt is now homebound and cannot get in to PCP office. She will ask pt's dtr to call our office at [phone] and speak with this nurse about the referral intake information.

## 2018-06-18 NOTE — PROGRESS NOTES
Home Based Primary Care & Supportive Services   (previously: At Home)  [phone] 4101 [facility], [address] [facility], [address]    We received a HBPC & SS referral on [address]  from Francisco Eldridge 3. We appreciate this referral.     The patient's case has been reviewed and _x__  Meets / ____ Does not Meet  the following criteria for an initial provider visit:    _x__  Patient's residence is within 20 miles of [facility] address listed above (1.4 miles)  _x___Patient is non-ambulatory (bedbound or wheel chair bound without ability to self-propel)  _x__ Patient's residence is determined to be a safe environment for the health care team  _x__ Patient has chronic care management health care needs    This patient also ___ Meets / _x__ Does not Meet a priority referral for:    ___ [address] integration  ___ Post discharge follow up  ___ High risk health care needs    This patient's referring provider has been notified of above. Nurse called and left message with Dr. Marine Howard nurse.     PATIENT DEMOGRAPHICS     [address] [facility],[address]  [phone]  1934     PRIMARY CARE PROVIDER:  Name: Huey Rivas MD  Phone Number:  [phone] [facility], [address] [place] [address]  Address: [phone]     REFERRAL SOURCE CONTACT INFORMATION:  Name:  Manny Bautista RN, [facility]  Phone Number:  [phone]     DIAGNOSIS:   Intracerebral bleed (Aug 2014), interstitial lung disease (on O2 at 2L), HTN, compression fx L4 vertebra Feb 2016 with back pain, GERD, renal calculi (chronic gramajo), recurrent UTI's     PRIMARY CARETAKER:  Name:  Duncan Bowling  (speaks some English)  Phone Number:  [phone]  Relationship to the Patient: daughter    ACP:    Advance Care Planning 9/26/2016   Patient's Healthcare Decision Maker is: Verbal statement (Legal Next of Kin remains as decision maker)   Primary Decision Maker Name [address] [facility]   Primary Decision Maker Phone Number [phone] Primary Decision Maker Relationship to Patient Adult child   Confirm Advance Directive None   Patient Would Like to Complete Advance Directive No       HOME ENVIRONMENT:    One story home, 3 steps to enter, currently no ramp. Per PT notes, pt applied to [facility] in May 2018 and is on the list to have a ramp built. ADL's:  Medication Management: family and hired aide administer medications  Transportation:    Pt currently unable to leave her home. 5/24/18 PT note says PT can revisit pt to teach safe exiting of home once the ramp is built. Hygiene:    Requires assistance with bathing, dressing. Dtr Brigido Hermosillo or aide assist pt. OT note 5/15/18 says pt is unable to get into tub/shower due to shower doors. O.T. is on hold until tub transfers can be addressed.   The pt.'s daughter is supposed to be finding someone who can remove the bathroom doors so pt. can use a transfer tub bench.   Once this has been done, OT will revisit. Mobility:  Ambulates short distances with rollator. Does not have a wheelchair  Falls within past 6 months:  no    DME: rollator, oxygen, (pt sleeps on a couch)    HOME SERVICES:  [facility] [place], [address], OT) has been seeing pt since 2/18/17. OT and PT are now on hold, SN still involved. Part time hired aide. NUTRITION:  No special diet, pt feeds self    SKIN:  Intact    TOILETING:  Chronic gramajo, goes to bathroom for bowel movements    DATE OF MOST RECENT CONTACT WITH A PROVIDER:  8/13/17 ED visit    DATE OF MOST RECENT PCP OFFICE VISIT:  unknown    DATE OF MOST RECENT SPECIALIST VISIT/ UPCOMING APPTS:  Unknown, pt is followed by Dr. Vicky Foote (urology). HH note says Dr. Candi Lee office said pt has had multiple missed or cancelled visits. WHAT BARRIERS DO YOU HAVE TO GETTING TO MD OFFICE:  Stairs to exit home, no ramp. Unable to walk up and down stairs.     RECENT ED VISITS:  8/13/17 (urinary retention)    Francisco Jackson 892:  none      NOTES:  Pt does not speak [place]. Nurse called and spoke with ex-DIL Lux Silverman who speaks English ([phone]). Pt lives with her daughter Job Garcia who speaks minimal English. Ms. Momo Schwarz says daughter Bette Santo signs pt's paperwork. Pt has two sons:  [de-identified] Chrissy who is now in a nursing home and son Franck Crook. Pt is of [place], [place], and [place] descent. She speaks a [employer]. DAMASO says [employer] phone does not always work with pt because some of the [employer] interpretors that healthcare staff have tried in the past have not spoken pt's dialect. Pt has Medicare, no prior auth needed.     GEETA Johnson, RN-BC  Home Based Primary Care Referral Navigator  Phone:  [phone]    Fax:  [phone]  [email or web address]

## 2018-06-21 NOTE — TELEPHONE ENCOUNTER
Called [phone] spoke with Getachew Patel (pt's daughter) and conference in [facility] #[id number] offered appt for 7/2/18 eta 9:30am, Micheline confirmed. Informed Micheline paperwork about 8-10 pages will need to be signed, and to have all medication bottles out for review, there may be blood drawn. Micheline understood. Pt referred by: Shaista Castillo RN [facility].

## 2018-07-02 PROBLEM — J84.112 IPF (IDIOPATHIC PULMONARY FIBROSIS) (HCC): Status: ACTIVE | Noted: 2018-01-01

## 2018-07-02 PROBLEM — M80.00XD AGE-RELATED OSTEOPOROSIS WITH CURRENT PATHOLOGICAL FRACTURE WITH ROUTINE HEALING: Status: ACTIVE | Noted: 2018-01-01

## 2018-07-02 PROBLEM — Z97.8 CHRONIC INDWELLING FOLEY CATHETER: Status: ACTIVE | Noted: 2018-01-01

## 2018-07-02 PROBLEM — I10 ESSENTIAL HYPERTENSION: Status: ACTIVE | Noted: 2018-01-01

## 2018-07-02 PROBLEM — L80 VITILIGO: Status: ACTIVE | Noted: 2018-01-01

## 2018-07-02 NOTE — ASSESSMENT & PLAN NOTE
Stable, based on history, physical exam and review of pertinent labs, studies and medications; meds reconciled; continue current treatment plan. Key Neurological Meds             gabapentin (NEURONTIN) 100 mg capsule  (Taking) Take 100 mg by mouth three (3) times daily.         Lab Results   Component Value Date/Time    WBC 7.1 04/05/2017 12:05 PM    HGB 11.1 04/05/2017 12:05 PM    Hemoglobin (POC) 11.6 08/13/2017 10:04 AM    HCT 33.8 04/05/2017 12:05 PM    Hematocrit (POC) 34 08/13/2017 10:04 AM    PLATELET 098 79/18/8748 12:05 PM    Creatinine 0.87 04/05/2017 12:05 PM    Creatinine (POC) 0.6 08/13/2017 10:04 AM    BUN 13 04/05/2017 12:05 PM    BUN (POC) 12 08/13/2017 10:04 AM

## 2018-07-02 NOTE — PROGRESS NOTES
Home Based [address] [facility]   [id number]      Date of Current Visit: 07/02/18  Date of Initial HB Visit: 7/2/18     SUMMARY:   Ms. Olevia Aase is an 80year old with a history of  intracerebral bleed (Aug 2014), interstitial lung disease (on O2 at 2L), HTN, compression fracture of L4 vertebra Feb 2016 with back pain, GERD, renal calculi (chronic gramajo), recurrent UTI's. She is followed by home care / [facility] for changing the catheter. She is cared for by her daughter at home (her son had a cerebral event and is now in a nursing home after many months at [facility]). Reviewed Urology notes from Dr. [facility] 9/27/17  Had cystoscopy on 12/8/17 showing multiple bladder stones, these were removed and analyzed. Analysis: 38% calcium phosphate; 30% calcium oxalate monohydrate; 30% mag oral phosphate  CXR on 12/6/17 showed chronic appearing linear opacities are again noted bilaterally, there is prominence of the pulmonary vasculature and mild hydrostatic edema. Diagnosis: IPF  Primary Caregiver: Hired and daughter  Home Environment: No identifiable issues  -Ramp: No, but planned  -Fire Safety: Yes  Function: Hard to walk; very unstable  -Fall Risk: High  DME/Equipment: walker, rollator, oxygen  Home Services: [facility]; changes catheter  Nutrition: oral  Skin: intact, vitiligo  : chronic gramajo catheter  Medical Visits: has not been able to make it  ED visits: 2017  Admission: N/A     GOALS OF CARE / TREATMENT PREFERENCES:   GOALS OF CARE:  Patient / health care proxy stated goals:  To breath better at night    TREATMENT PREFERENCES:  Have not yet addressed  Code Status:  [x] Attempt Resuscitation       [] Do Not Attempt Resuscitation    Advance Care Planning:  Advance Care Planning 9/26/2016   Patient's Healthcare Decision Maker is: Verbal statement (Legal Next of Kin remains as decision maker)   Primary Decision Maker Name [address] [facility]   Primary Decision Maker Phone Number [phone] Primary Decision Maker Relationship to Patient Adult child   Confirm Advance Directive None   Patient Would Like to Complete Advance Directive No     DIAGNOSES:       ICD-10-CM ICD-9-CM    1. Chronic indwelling Chambers catheter Z92.89 V45.89 CBC W/O DIFF      METABOLIC PANEL, COMPREHENSIVE      VITAMIN B12      VITAMIN D, 25 HYDROXY      TSH 3RD GENERATION      JOSE ALBERTO BY MULTIPLEX FLOW IA, QL      SED RATE (ESR)      AMB SUPPLY ORDER      AMB SUPPLY ORDER      DISCONTINUED: albuterol-ipratropium (DUO-NEB) 2.5 mg-0.5 mg/3 ml nebu   2. Debility R53.81 799.3 CBC W/O DIFF      METABOLIC PANEL, COMPREHENSIVE      VITAMIN B12      VITAMIN D, 25 HYDROXY      TSH 3RD GENERATION      JOSE ALBERTO BY MULTIPLEX FLOW IA, QL      SED RATE (ESR)      AMB SUPPLY ORDER      AMB SUPPLY ORDER      DISCONTINUED: albuterol-ipratropium (DUO-NEB) 2.5 mg-0.5 mg/3 ml nebu   3. Weight loss R63.4 783.21 CBC W/O DIFF      METABOLIC PANEL, COMPREHENSIVE      VITAMIN B12      VITAMIN D, 25 HYDROXY      TSH 3RD GENERATION      JOSE ALBERTO BY MULTIPLEX FLOW IA, QL      SED RATE (ESR)      AMB SUPPLY ORDER      AMB SUPPLY ORDER      REFERRAL TO SPEECH THERAPY      DISCONTINUED: albuterol-ipratropium (DUO-NEB) 2.5 mg-0.5 mg/3 ml nebu   4. Intractable back pain M54.9 724.5 CBC W/O DIFF      METABOLIC PANEL, COMPREHENSIVE      VITAMIN B12      VITAMIN D, 25 HYDROXY      TSH 3RD GENERATION      JOSE ALBERTO BY MULTIPLEX FLOW IA, QL      SED RATE (ESR)      AMB SUPPLY ORDER      AMB SUPPLY ORDER      DISCONTINUED: albuterol-ipratropium (DUO-NEB) 2.5 mg-0.5 mg/3 ml nebu   5. Dyspnea, unspecified type R06.00 786.09 CBC W/O DIFF      METABOLIC PANEL, COMPREHENSIVE      VITAMIN B12      VITAMIN D, 25 HYDROXY      TSH 3RD GENERATION      JOSE ALBERTO BY MULTIPLEX FLOW IA, QL      SED RATE (ESR)      AMB SUPPLY ORDER      AMB SUPPLY ORDER      DISCONTINUED: albuterol-ipratropium (DUO-NEB) 2.5 mg-0.5 mg/3 ml nebu   6.  Requires oxygen therapy Z99.81 V46.2 CBC W/O DIFF      METABOLIC PANEL, COMPREHENSIVE      VITAMIN B12      VITAMIN D, 25 HYDROXY      TSH 3RD GENERATION      JOSE ALBERTO BY MULTIPLEX FLOW IA, QL      SED RATE (ESR)      AMB SUPPLY ORDER      AMB SUPPLY ORDER      DISCONTINUED: albuterol-ipratropium (DUO-NEB) 2.5 mg-0.5 mg/3 ml nebu   7. Closed compression fracture of fourth lumbar vertebra, sequela S32.040S 905.1 CBC W/O DIFF      METABOLIC PANEL, COMPREHENSIVE      VITAMIN B12      VITAMIN D, 25 HYDROXY      TSH 3RD GENERATION      JOSE ALBERTO BY MULTIPLEX FLOW IA, QL      SED RATE (ESR)      AMB SUPPLY ORDER      AMB SUPPLY ORDER      DISCONTINUED: albuterol-ipratropium (DUO-NEB) 2.5 mg-0.5 mg/3 ml nebu   8. Neuropathy G62.9 355.9 CBC W/O DIFF      METABOLIC PANEL, COMPREHENSIVE      VITAMIN B12      VITAMIN D, 25 HYDROXY      TSH 3RD GENERATION      JOSE ALBERTO BY MULTIPLEX FLOW IA, QL      SED RATE (ESR)      AMB SUPPLY ORDER      AMB SUPPLY ORDER      DISCONTINUED: albuterol-ipratropium (DUO-NEB) 2.5 mg-0.5 mg/3 ml nebu   9. Other general symptoms and signs  R68.89 780.99 CBC W/O DIFF      METABOLIC PANEL, COMPREHENSIVE      VITAMIN B12      VITAMIN D, 25 HYDROXY      TSH 3RD GENERATION      JOSE ALBERTO BY MULTIPLEX FLOW IA, QL      SED RATE (ESR)      AMB SUPPLY ORDER      AMB SUPPLY ORDER      DISCONTINUED: albuterol-ipratropium (DUO-NEB) 2.5 mg-0.5 mg/3 ml nebu   10. Other osteoporosis without current pathological fracture  M81.8 733.09 CBC W/O DIFF      METABOLIC PANEL, COMPREHENSIVE      VITAMIN B12      VITAMIN D, 25 HYDROXY      TSH 3RD GENERATION      JOSE ALBERTO BY MULTIPLEX FLOW IA, QL      SED RATE (ESR)      AMB SUPPLY ORDER      AMB SUPPLY ORDER      DISCONTINUED: albuterol-ipratropium (DUO-NEB) 2.5 mg-0.5 mg/3 ml nebu   11.  Interstitial lung disease (HCC) J84.9 515 CBC W/O DIFF      METABOLIC PANEL, COMPREHENSIVE      VITAMIN B12      VITAMIN D, 25 HYDROXY      TSH 3RD GENERATION      JOSE ALBERTO BY MULTIPLEX FLOW IA, QL      SED RATE (ESR)      AMB SUPPLY ORDER      AMB SUPPLY ORDER      REFERRAL TO SPEECH THERAPY      DISCONTINUED: albuterol-ipratropium (DUO-NEB) 2.5 mg-0.5 mg/3 ml nebu   12. Pulmonary fibrosis (HCC) J84.10 515 CBC W/O DIFF      METABOLIC PANEL, COMPREHENSIVE      VITAMIN B12      VITAMIN D, 25 HYDROXY      TSH 3RD GENERATION      JOSE ALBERTO BY MULTIPLEX FLOW IA, QL      SED RATE (ESR)      AMB SUPPLY ORDER      AMB SUPPLY ORDER      DISCONTINUED: albuterol-ipratropium (DUO-NEB) 2.5 mg-0.5 mg/3 ml nebu        PLAN:   Patient Instructions     Dear Renee Quintanilla ,    It was a pleasure seeing you at home today with Home Based Primary Care ([phone])    Your stated goal: To breath better at night    1. Social history  You are from [place]. You came after your sons came in the early 46s. You came with your daughter. You speak only [place]. 2. Breathing  You never smoked cigarettes. You have trouble with your breathing. You are on oxygen all the time at 2L. You do not currently have nebulizers in the home. I will be ordering these for your breathing. I would like to see an x-ray of your lungs and will order this for the home    Today we will order a semi-electric Hospital bed. You are oxygen dependent requiring your head of bed to be elevated at all times 30 degrees to prevent aspiration due to diagnosis of interstitial lung disease and pulmonary fibrosis. You are coughing after eating/drinking frequently indicating aspiration. You also require positioning that cannot not be achieved with the couch you sleep on, an ordinary bed, or with pillows, wedges or props due diagnosis history of closed compression fracture L4 vertebra and gramajo cath placement. The hospital bed with help with your pain and assist with safe transfers in and out of bed. 3. Function  It is hard for your to stand and walk on your own. Most of the time you are sitting. You currently have a walker. You have not been able to get out of the home. They are building a ramp for your home in the near future.      4. Support in the home  You have a daily aide that helps you from 9 to 2pm. Your daughter helps you as well. Your aide shared that she helps you with bathing and meals. Your aide also helps with you with your daily exercises. 5. Urine Catheter  Home Health changes your urine catheter every month. I will review records to make sure that I understand why you need to have a catheter all the time    6. Weight/nutrition  Your weight is 77.4lbs (estimated as this is hard for your to stand). Your arm measurement is 20cm on the left side and 20.1 cm on the right side. You are coughing after eating; we will have the home health team add a speech therapist to evaluate your swallowing for aspiration/safety    7. Blood work  I will send some blood work today to help understand how her body is functioning    8. We will look at her vaccinations next visit    Health Maintenance  Health Maintenance Due   Topic Date Due    DTaP/Tdap/Td series (1 - Tdap) 04/30/1955    ZOSTER VACCINE AGE 60>  02/28/1994    GLAUCOMA SCREENING Q2Y  04/30/1999    Pneumococcal 65+ Low/Medium Risk (2 of 2 - PCV13) 05/11/2014    MEDICARE YEARLY EXAM  03/14/2018     9.  We will discuss Advance Care Planning at the next visit    [address] [place] [address]   This is what you have shared with us about [address] currently do not have any documents that indicate your health care wishes    Advance Care Planning 9/26/2016   Patient's [address] is: Verbal statement (Legal Next of Kin remains as decision maker)   Primary Decision Maker Name  VU   Primary Decision Maker Phone Number [phone]   Primary Decision Maker Relationship to Patient Adult child   Confirm Advance Directive None   Patient Would Like to Complete Advance Directive No     Someone from the [address] [place] [address] Team will see you again in 2 weeks    If there are any concerns before that time, such as medication questions, worsening symptoms or a need to see a physician for an urgent or emergent situation; please call [phone]. A physician is also on call after our normal business hours of 8am to 5pm.     In order to serve you better, please allow up to 48 hours for prescription refills to be processed. Certain medications may require more paperwork or a written prescription that you may need to  from the office. We appreciate you letting us know of any refill requests as soon as possible. Sincerely,    The Home Based Primary Care Team  MD Cade Davila NP Beverli Self, JOANNA       PRESCRIPTIONS GIVEN:     Medications Ordered Today   Medications    DISCONTD: albuterol-ipratropium (DUO-NEB) 2.5 mg-0.5 mg/3 ml nebu     Sig: 3 mL by Nebulization route two (2) times a day. Dispense:  60 Nebule     Refill:  2         HISTORY:   Please see RN note from this current visit; history taken together in presence of patient/family in the home. CHIEF COMPLAINT:   Chief Complaint   Patient presents with    Breathing Problem     HPI/SUBJECTIVE:    The patient is: [x] Verbal / [] Nonverbal     See patient instructions; patient does not speak [place]  Daughter does not speak [place]; need [facility]   See Patient Summary for detailed HPI above    REVIEW OF SYSTEMS:     The following systems were [x] reviewed / [] unable to be reviewed  Systems: constitutional, ears/nose/mouth/throat, respiratory, gastrointestinal, genitourinary, musculoskeletal, integumentary, neurologic, psychiatric, endocrine.      Positive findings noted below: urinary pain; sob at night     FUNCTIONAL ASSESSMENT:     Palliative Performance Scale (PPS):  50-60     PSYCHOSOCIAL/SPIRITUAL SCREENING:      Any spiritual / Muslim concerns: [] Yes /  [x] No     Caregiver Burnout: [] Yes /  [x] No /  [] No Caregiver Present       PHYSICAL EXAM:     Wt Readings from Last 3 Encounters:   07/02/18 77 lb 6.4 oz (35.1 kg)   08/13/17 88 lb (39.9 kg)   03/21/17 88 lb (39.9 kg)     Blood pressure 132/79, pulse 91, temperature 97.5 °F (36.4 °C), temperature source Oral, resp. rate 16, height 5' (1.524 m), weight 77 lb 6.4 oz (35.1 kg), SpO2 99 %. Last bowel movement:  No constipation    Constitutional: alert, oriented, very small frame female; vitiligo  Eyes: pupils equal, anicteric  ENMT: no nasal discharge, moist mucous membranes  Cardiovascular: regular rhythm, distal pulses intact  Respiratory: breathing not labored, symmetric, chronic fibrotic changes heard in both lungs to mid lung zone bilaterally  Gastrointestinal: soft non-tender, +bowel sounds  Musculoskeletal: no deformity, no tenderness to palpation  Skin: warm, dry, vitilgo  Neurologic: following commands, moving all extremities  Psychiatric: full affect, no hallucinations  Other:     HISTORY:     Past Medical and Surgical History reviewed in [facility] on date of initial visit    Patient Active Problem List   Diagnosis Code    Intracerebral bleed ([facility] I61.9    Compression fracture of fourth lumbar vertebra (HCC) S32.040A    IPF (idiopathic pulmonary fibrosis) ([facility] J84.112    Age-related osteoporosis with current pathological fracture with routine healing M80.00XD    Chronic indwelling Chambers catheter Z92.89    Essential hypertension I10    Vitiligo L80     No family history on file. History reviewed, no pertinent family history. Social History   Substance Use Topics    Smoking status: Never Smoker    Smokeless tobacco: Never Used    Alcohol use No     Allergies   Allergen Reactions    Aspirin Nausea Only      Current Outpatient Prescriptions   Medication Sig    OXYGEN-AIR DELIVERY SYSTEMS Take 2 L/min by inhalation continuous.  metoprolol tartrate (LOPRESSOR) 25 mg tablet Take 25 mg by mouth two (2) times a day.  gabapentin (NEURONTIN) 100 mg capsule Take 100 mg by mouth three (3) times daily.  amLODIPine (NORVASC) 5 mg tablet Take 1 Tab by mouth daily.     omeprazole (PRILOSEC) 20 mg capsule Take 20 mg by mouth daily.    albuterol-ipratropium (DUO-NEB) 2.5 mg-0.5 mg/3 ml nebu INHALE 3 ML VIA NEBULIZER TWICE DAILY    acetaminophen (TYLENOL) 325 mg tablet Take 2 Tabs by mouth every four (4) hours as needed for Fever. No current facility-administered medications for this visit. LAB DATA REVIEWED:     Lab Results   Component Value Date/Time    Hemoglobin A1c 5.0 09/01/2014 03:54 AM     No results found for: MCACR, MCA1, MCA2, MCA3, MCAU, MCAU2, MCALPOCT  Lab Results   Component Value Date/Time    TSH 2.53 06/15/2016 10:42 AM     No results found for: Belem Kern PARKER3RIA      Lab Results   Component Value Date/Time    WBC 7.1 04/05/2017 12:05 PM    HGB 11.1 (L) 04/05/2017 12:05 PM    PLATELET 756 78/04/2839 12:05 PM     Lab Results   Component Value Date/Time    Sodium 142 04/05/2017 12:05 PM    Potassium 3.4 (L) 04/05/2017 12:05 PM    Chloride 103 04/05/2017 12:05 PM    CO2 30 04/05/2017 12:05 PM    BUN 13 04/05/2017 12:05 PM    Creatinine 0.87 04/05/2017 12:05 PM    Calcium 8.6 04/05/2017 12:05 PM    Magnesium 1.8 07/18/2016 02:34 AM    Phosphorus 2.8 07/18/2016 02:34 AM      Lab Results   Component Value Date/Time    AST (SGOT) 44 (H) 04/05/2017 12:05 PM    Alk.  phosphatase 106 04/05/2017 12:05 PM    Protein, total 7.1 04/05/2017 12:05 PM    Albumin 3.4 (L) 04/05/2017 12:05 PM    Globulin 3.7 04/05/2017 12:05 PM     Lab Results   Component Value Date/Time    Iron 34 (L) 05/05/2014 02:50 AM    TIBC 251 05/05/2014 02:50 AM    Iron % saturation 14 (L) 05/05/2014 02:50 AM    Ferritin 90 05/05/2014 02:50 AM            Total time: 90 min  Counseling / coordination time: 50 min  > 50% counseling / coordination?: yes re records review (Urology) and  communication

## 2018-07-02 NOTE — PROGRESS NOTES
Home Based [address] [facility] [address]  [phone]    RN Nursing Visit Note    Date of Initial Visit: 07/02/18    Diagnosis: Intracerebral bleed (Aug 2014), interstitial lung disease (on O2 at 2L), HTN, compression fx L4 vertebra Feb 2016 with back pain, GERD, renal calculi (chronic gramajo), recurrent UTI's    ACP:  Advance Care Planning 9/26/2016   Patient's Healthcare Decision Maker is: Verbal statement (Legal Next of Kin remains as decision maker)   [facility]   Primary Decision Maker Phone Number [phone]   Primary Decision Maker Relationship to Patient Adult child   Confirm Advance Directive None   Patient Would Like to Complete Advance Directive No       Nursing Assessment: Kiki Harris, 80     Primary Caregiver:    Social: One story home, 3 steps to enter, currently no ramp. Home Environment:  -Ramp:  Per PT notes, pt applied to [employer] in May 2018 and is on the list to have a ramp built.  -Fire Safety:    Function:  -Fall Risk:    DME/Equipment:  Levonne Abelson  Oxygen - [employer]  Rollator    Home Services:  Mon-Fri (9-2 pm) -[facility]    Nutrition:  Wt Readings from Last 3 Encounters:   08/13/17 88 lb (39.9 kg)   03/21/17 88 lb (39.9 kg)   03/07/17 88 lb (39.9 kg)       Skin: Warm, dry, intact and appropriate for ethnicity    : patient is continent of bladder, but wears attends during the day. Gramajo cath in tact       Visit Vitals    /79 (BP 1 Location: Left arm, BP Patient Position: Sitting)    Pulse 91    Temp 97.5 °F (36.4 °C) (Oral)    Resp 16    SpO2 99%     MUAC  Left = 20 cm  Right = 20.1 cm    Medication Management:  Current Outpatient Prescriptions   Medication Sig    cephALEXin (KEFLEX) 250 mg capsule Take 2 Caps by mouth four (4) times daily. (Patient not taking: Reported on 5/11/2017)    OXYGEN-AIR DELIVERY SYSTEMS Take 2 L/min by inhalation continuous.     metoprolol tartrate (LOPRESSOR) 25 mg tablet Take 25 mg by mouth two (2) times a day.  gabapentin (NEURONTIN) 100 mg capsule Take 100 mg by mouth three (3) times daily.  acetaminophen (TYLENOL) 325 mg tablet Take 2 Tabs by mouth every four (4) hours as needed for Fever.  amLODIPine (NORVASC) 5 mg tablet Take 1 Tab by mouth daily.  omeprazole (PRILOSEC) 20 mg capsule Take 20 mg by mouth daily. No current facility-administered medications for this visit. Test Results:  Lab Results   Component Value Date/Time    Sodium 142 04/05/2017 12:05 PM    Potassium 3.4 (L) 04/05/2017 12:05 PM    Chloride 103 04/05/2017 12:05 PM    CO2 30 04/05/2017 12:05 PM    Anion gap 9 04/05/2017 12:05 PM    Glucose 96 04/05/2017 12:05 PM    BUN 13 04/05/2017 12:05 PM    Creatinine 0.87 04/05/2017 12:05 PM    BUN/Creatinine ratio 15 04/05/2017 12:05 PM    GFR est AA >60 04/05/2017 12:05 PM    GFR est non-AA >60 04/05/2017 12:05 PM    Calcium 8.6 04/05/2017 12:05 PM       Lab Results   Component Value Date/Time    WBC 7.1 04/05/2017 12:05 PM    Hemoglobin (POC) 11.6 08/13/2017 10:04 AM    HGB 11.1 (L) 04/05/2017 12:05 PM    Hematocrit (POC) 34 (L) 08/13/2017 10:04 AM    HCT 33.8 (L) 04/05/2017 12:05 PM    PLATELET 307 40/75/5338 12:05 PM    MCV 76.3 (L) 04/05/2017 12:05 PM       No results found for: XHEPCS, [id number], HCGAT    No results found for: MCACR, MCA1, MCA2, MCA3, MCAU, MCAU2, MCALPOCT    Lab Results   Component Value Date/Time    Hemoglobin A1c 5.0 09/01/2014 03:54 AM       Health Maintenance:  Health Maintenance Due   Topic Date Due    DTaP/Tdap/Td series (1 - Tdap) 04/30/1955    ZOSTER VACCINE AGE 60>  02/28/1994    GLAUCOMA SCREENING Q2Y  04/30/1999    Pneumococcal 65+ Low/Medium Risk (2 of 2 - PCV13) 05/11/2014    MEDICARE YEARLY EXAM  03/14/2018       Action Items:  1. MD recommend Nebulizer machine -order sent to [address]  2. MD recommend hospital bed  3. We will connect with Home Health - Need for catheter, shower chair  4.  Refer to speech therapy for eval  5.  Labs drawn today - we will call with results    Follow Up Visit:  2 week visit 7/18/18

## 2018-07-02 NOTE — ACP (ADVANCE CARE PLANNING)
Home Based Primary Care    No documents on file; will address within 2 visits    Advance Care Planning 9/26/2016   Patient's [address] is: Verbal statement (Legal Next of Kin remains as decision maker)   Primary Decision Maker Name [facility]   Primary Decision Maker Phone Number [phone]   Primary Decision Maker Relationship to Patient Adult child   Confirm Advance Directive None   Patient Would Like to Complete Advance Directive No

## 2018-07-02 NOTE — ASSESSMENT & PLAN NOTE
Stable, based on history, physical exam and review of pertinent labs, studies and medications; meds reconciled; continue current treatment plan.   Lab Results   Component Value Date/Time    WBC 7.1 04/05/2017 12:05 PM    HGB 11.1 04/05/2017 12:05 PM    Hemoglobin (POC) 11.6 08/13/2017 10:04 AM    HCT 33.8 04/05/2017 12:05 PM    Hematocrit (POC) 34 08/13/2017 10:04 AM    PLATELET 665 58/47/1341 12:05 PM    Creatinine 0.87 04/05/2017 12:05 PM    Creatinine (POC) 0.6 08/13/2017 10:04 AM    BUN 13 04/05/2017 12:05 PM    BUN (POC) 12 08/13/2017 10:04 AM    Potassium 3.4 04/05/2017 12:05 PM    Potassium (POC) 2.7 08/13/2017 10:04 AM

## 2018-07-02 NOTE — PATIENT INSTRUCTIONS
Dear Caron Man ,    It was a pleasure seeing you at home today with Home Based Primary Care ([phone])    Your stated goal: To breath better at night    1. Social history  You are from [place]. You came after your sons came in the early 46s. You came with your daughter. You speak only [facility]. 2. Breathing  You never smoked cigarettes. You have trouble with your breathing. You are on oxygen all the time at 2L. You do not currently have nebulizers in the home. I will be ordering these for your breathing. I would like to see an x-ray of your lungs and will order this for the home    Today we will order a semi-electric Hospital bed. You are oxygen dependent requiring your head of bed to be elevated at all times 30 degrees to prevent aspiration due to diagnosis of interstitial lung disease and pulmonary fibrosis. You are coughing after eating/drinking frequently indicating aspiration. You also require positioning that cannot not be achieved with the couch you sleep on, an ordinary bed, or with pillows, wedges or props due diagnosis history of closed compression fracture L4 vertebra and gramajo cath placement. The hospital bed with help with your pain and assist with safe transfers in and out of bed. 3. Function  It is hard for your to stand and walk on your own. Most of the time you are sitting. You currently have a walker. You have not been able to get out of the home. They are building a ramp for your home in the near future. 4. Support in the home  You have a daily aide that helps you from 9 to 2pm. Your daughter helps you as well. Your aide shared that she helps you with bathing and meals. Your aide also helps with you with your daily exercises. 5. Urine Catheter  Home Health changes your urine catheter every month. I will review records to make sure that I understand why you need to have a catheter all the time    6.  Weight/nutrition  Your weight is 77.4lbs (estimated as this is hard for your to stand). Your arm measurement is 20cm on the left side and 20.1 cm on the right side. You are coughing after eating; we will have the home health team add a speech therapist to evaluate your swallowing for aspiration/safety    7. Blood work  I will send some blood work today to help understand how her body is functioning    8. We will look at her vaccinations next visit    Health Maintenance  Health Maintenance Due   Topic Date Due    DTaP/Tdap/Td series (1 - Tdap) 04/30/1955    ZOSTER VACCINE AGE 60>  02/28/1994    GLAUCOMA SCREENING Q2Y  04/30/1999    Pneumococcal 65+ Low/Medium Risk (2 of 2 - PCV13) 05/11/2014    MEDICARE YEARLY EXAM  03/14/2018     9. We will discuss Advance Care Planning at the next visit    [address] [place] [address]   This is what you have shared with us about [address] currently do not have any documents that indicate your health care wishes    Advance Care Planning 9/26/2016   Patient's [address] is: Verbal statement (Legal Next of Kin remains as decision maker)   Primary Decision Maker Name SA WOMACK   Primary Decision Maker Phone Number [phone]   Primary Decision Maker Relationship to Patient Adult child   Confirm Advance Directive None   Patient Would Like to Complete Advance Directive No     Someone from the [address] [facility] Team will see you again in 2 weeks    If there are any concerns before that time, such as medication questions, worsening symptoms or a need to see a physician for an urgent or emergent situation; please call [phone]. A physician is also on call after our normal business hours of 8am to 5pm.     In order to serve you better, please allow up to 48 hours for prescription refills to be processed. Certain medications may require more paperwork or a written prescription that you may need to  from the office. We appreciate you letting us know of any refill requests as soon as possible.     Sincerely,    The Home Based Primary Care Team  MD Rafa Gonzalez, EVANGELIST Galvez, RN

## 2018-07-02 NOTE — MR AVS SNAPSHOT
[facility] [facility] 
[phone] Patient: Popeye Dean 
MRN: [id number] DXN:[id number] Visit Information Date & Time Provider Department Dept. Phone Encounter #  
 7/2/2018  9:30 AM Marco Antonio Fox MD [address] [facility] Services [phone] [id number] Follow-up Instructions Return in about 2 weeks (around 7/16/2018). Follow-up and Disposition History Upcoming Health Maintenance Date Due DTaP/Tdap/Td series (1 - Tdap) 4/30/1955 ZOSTER VACCINE AGE 60> 2/28/1994 GLAUCOMA SCREENING Q2Y 4/30/1999 Pneumococcal 65+ Low/Medium Risk (2 of 2 - PCV13) 5/11/2014 MEDICARE YEARLY EXAM 3/14/2018 Influenza Age 5 to Adult 8/1/2018 Allergies as of 7/2/2018  Review Complete On: 7/2/2018 By: Kimberly Cao RN Severity Noted Reaction Type Reactions Aspirin  05/10/2013   Side Effect Nausea Only Current Immunizations  Reviewed on 2/20/2016 Name Date Pneumococcal Polysaccharide (PPSV-23) 5/11/2013  2:03 PM  
  
 Not reviewed this visit You Were Diagnosed With   
  
 Codes Comments Chronic indwelling Chambers catheter    -  Primary ICD-10-CM: Z92.89 ICD-9-CM: V45.89 Debility     ICD-10-CM: R53.81 ICD-9-CM: 799.3 Weight loss     ICD-10-CM: R63.4 ICD-9-CM: 783.21 Intractable back pain     ICD-10-CM: M54.9 ICD-9-CM: 724.5 Dyspnea, unspecified type     ICD-10-CM: R06.00 
ICD-9-CM: 786.09 Requires oxygen therapy     ICD-10-CM: Z99.81 ICD-9-CM: V46.2 Closed compression fracture of fourth lumbar vertebra, sequela     ICD-10-CM: S32.040S 
ICD-9-CM: 905.1 Neuropathy     ICD-10-CM: G62.9 ICD-9-CM: 355.9 Other general symptoms and signs     ICD-10-CM: R68.89 ICD-9-CM: 780.99 Other osteoporosis without current pathological fracture     ICD-10-CM: M81.8 ICD-9-CM: 733.09   
 Interstitial lung disease ([facility]     ICD-10-CM: J84.9 ICD-9-CM: 831 Pulmonary fibrosis ([facility]     ICD-10-CM: J84.10 ICD-9-CM: 101 Vitals BP Pulse Temp Resp Height(growth percentile) Weight(growth percentile) 132/79 (BP 1 Location: Left arm, BP Patient Position: Sitting) 91 97.5 °F (36.4 °C) (Oral) 16 5' (1.524 m) 77 lb 6.4 oz (35.1 kg) SpO2 BMI OB Status Smoking Status 99% 15.12 kg/m2 Postmenopausal Never Smoker Vitals History BMI and BSA Data Body Mass Index Body Surface Area  
 15.12 kg/m 2 1.22 m 2 Preferred Pharmacy Pharmacy Name Phone [facility] [phone] Your Updated Medication List  
  
   
This list is accurate as of 7/2/18 11:59 PM.  Always use your most recent med list.  
  
  
  
  
 acetaminophen 325 mg tablet Commonly known as:  TYLENOL Take 2 Tabs by mouth every four (4) hours as needed for Fever. albuterol-ipratropium 2.5 mg-0.5 mg/3 ml Nebu Commonly known as:  Tavo Punches INHALE 3 ML VIA NEBULIZER TWICE DAILY  
  
 amLODIPine 5 mg tablet Commonly known as:  Kamryn Colon Take 1 Tab by mouth daily. gabapentin 100 mg capsule Commonly known as:  NEURONTIN Take 100 mg by mouth three (3) times daily. metoprolol tartrate 25 mg tablet Commonly known as:  LOPRESSOR Take 25 mg by mouth two (2) times a day. omeprazole 20 mg capsule Commonly known as:  PRILOSEC Take 20 mg by mouth daily. OXYGEN-AIR DELIVERY SYSTEMS Take 2 L/min by inhalation continuous. Prescriptions Sent to Pharmacy Refills  
 albuterol-ipratropium (DUO-NEB) 2.5 mg-0.5 mg/3 ml nebu (Discontinued) 2 Sig: 3 mL by Nebulization route two (2) times a day. Class: Normal  
 Pharmacy: [facility], [address]  #: [phone] Route: Nebulization Reason for Discontinue: Reorder We Performed the Following AMB SUPPLY ORDER [[id number] Custom] Comments:  
 Nebulizer and tubing with needed accessories. AMB SUPPLY ORDER [[id number] Custom] Comments:  
 Semi Electric Bed- Use as directed JOSE ALBERTO BY MULTIPLEX FLOW IA, QL [65581 CPT(R)] CBC W/O DIFF [61596 CPT(R)] METABOLIC PANEL, COMPREHENSIVE [42618 CPT(R)] REFERRAL TO SPEECH THERAPY [TGH698 Custom] Comments:  
 Evaluate for aspiration SED RATE (ESR) [id number] CPT(R)] TSH 3RD GENERATION [38653 CPT(R)] VITAMIN B12 [id number] CPT(R)] VITAMIN D, 25 HYDROXY [id number] CPT(R)] Follow-up Instructions Return in about 2 weeks (around 7/16/2018). To-Do List   
 07/11/2018 To Be Determined Appointment with Siobhan Antonia at [place] [address]  
  
 07/18/2018 To Be Determined Appointment with Siobhan Antonia at [place] [address]  
  
 07/25/2018 To Be Determined Appointment with Siobhan Antonia at [place] [address]  
  
 08/01/2018 To Be Determined Appointment with Siobhan Antonia at [place] [address]  
  
 08/08/2018 To Be Determined Appointment with Siobhan Antonia at [place] [address]  
  
 08/13/2018 To Be Determined Appointment with Siobhan Antonia at [place] [address] Referral Information Referral ID Referred By Referred To  
  
 [id number] Tiffany CLAIRE [facility]   
   [address].   
   [address], [address] [place] [address] Avenue Phone: [phone] Fax: [phone] Visits Status Start Date End Date 1 New Request 7/2/18 7/2/19 If your referral has a status of pending review or denied, additional information will be sent to support the outcome of this decision. Patient Instructions Dear Uriel Chaves , 
 
 It was a pleasure seeing you at home today with Home Based Ge[facility] Goetz ([phone]) Your stated goal: To breath better at night 1. Social history You are from [place]. You came after your sons came in the early 46s. You came with your daughter. You speak only [facility]. 2. Breathing You never smoked cigarettes. You have trouble with your breathing. You are on oxygen all the time at 2L. You do not currently have nebulizers in the home. I will be ordering these for your breathing. I would like to see an x-ray of your lungs and will order this for the home Today we will order a semi-electric Hospital bed. You are oxygen dependent requiring your head of bed to be elevated at all times 30 degrees to prevent aspiration due to diagnosis of interstitial lung disease and pulmonary fibrosis. You are coughing after eating/drinking frequently indicating aspiration. You also require positioning that cannot not be achieved with the couch you sleep on, an ordinary bed, or with pillows, wedges or props due diagnosis history of closed compression fracture L4 vertebra and gramajo cath placement. The hospital bed with help with your pain and assist with safe transfers in and out of bed. 3. Function It is hard for your to stand and walk on your own. Most of the time you are sitting. You currently have a walker. You have not been able to get out of the home. They are building a ramp for your home in the near future. 4. Support in the home You have a daily aide that helps you from 9 to 2pm. Your daughter helps you as well. Your aide shared that she helps you with bathing and meals. Your aide also helps with you with your daily exercises. 5. Urine Catheter Home Health changes your urine catheter every month. I will review records to make sure that I understand why you need to have a catheter all the time 6. Weight/nutrition Your weight is 77.4lbs (estimated as this is hard for your to stand). Your arm measurement is 20cm on the left side and 20.1 cm on the right side. You are coughing after eating; we will have the home health team add a speech therapist to evaluate your swallowing for aspiration/safety 7. Blood work I will send some blood work today to help understand how her body is functioning 8. We will look at her vaccinations next visit Health Maintenance Health Maintenance Due Topic Date Due  
 DTaP/Tdap/Td series (1 - Tdap) 04/30/1955  ZOSTER VACCINE AGE 60>  02/28/1994  GLAUCOMA SCREENING Q2Y  04/30/1999  Pneumococcal 65+ Low/Medium Risk (2 of 2 - PCV13) 05/11/2014  MEDICARE YEARLY EXAM  03/14/2018 9. We will discuss Advance Care Planning at the next visit Advance Care Planning This is what you have shared with us about Advance Care Planning You currently do not have any documents that indicate your health care wishes Advance Care Planning 9/26/2016 Patient's Healthcare Decision Maker is: Verbal statement (Legal Next of Kin remains as decision maker) Primary Decision Maker Name SA WOMACK Primary Decision Maker Phone Number [phone] Primary Decision Maker Relationship to Patient Adult child Confirm Advance Directive None Patient Would Like to Complete Advance Directive No  
 
Someone from the Home Based Primary Care Team will see you again in 2 weeks If there are any concerns before that time, such as medication questions, worsening symptoms or a need to see a physician for an urgent or emergent situation; please call [phone]. A physician is also on call after our normal business hours of 8am to 5pm.  
 
In order to serve you better, please allow up to 48 hours for prescription refills to be processed. Certain medications may require more paperwork or a written prescription that you may need to  from the office. We appreciate you letting us know of any refill requests as soon as possible.  
 
Sincerely, 
 
 The Home Based Primary Care Team 
MD Navya Ferrer NP Ronette Bolls, RN Patient Instructions History Introducing [facility]! [employer] introduces Panopto patient portal. Now you can access parts of your medical record, email your doctor's office, and request medication refills online. 1. In your internet browser, go to [email or web address] 2. Click on the First Time User? Click Here link in the Sign In box. You will see the New Member Sign Up page. 3. Enter your Panopto Access Code exactly as it appears below. You will not need to use this code after youve completed the sign-up process. If you do not sign up before the expiration date, you must request a new code. · Panopto Access Code: [email or web address] Expires: 9/16/2018  7:48 AM 
 
4. Enter the last four digits of your Social Security Number (xxxx) and Date of Birth (mm/dd/yyyy) as indicated and click Submit. You will be taken to the next sign-up page. 5. Create a Panopto ID. This will be your Panopto login ID and cannot be changed, so think of one that is secure and easy to remember. 6. Create a Panopto password. You can change your password at any time. 7. Enter your Password Reset Question and Answer. This can be used at a later time if you forget your password. 8. Enter your e-mail address. You will receive e-mail notification when new information is available in [address] 9. Click Sign Up. You can now view and download portions of your medical record. 10. Click the Download Summary menu link to download a portable copy of your medical information. If you have questions, please visit the Frequently Asked Questions section of the Panopto website. Remember, Panopto is NOT to be used for urgent needs. For medical emergencies, dial 911. Now available from your iPhone and Android! Please provide this summary of care documentation to your next provider. Your primary care clinician is listed as Paulie Restrepo. If you have any questions after today's visit, please call [phone].

## 2018-07-03 NOTE — TELEPHONE ENCOUNTER
Deysi Rayo requesting a UA C&S due to during gramajo change there is visual sediment and the line was occluded due to sediment, VO given with readback

## 2018-07-05 NOTE — TELEPHONE ENCOUNTER
Call placed to patients family , Kelsie Rodriguez patients caregiver speaks Jill Pichardo and informs she will explain this to family and patient.  No further questions or concerns presented

## 2018-07-05 NOTE — TELEPHONE ENCOUNTER
Interstitial Lung Disease and pulmonary fibrosis are not qualifying dx .   Noted and forwarded to [facility] Team for review

## 2018-07-07 NOTE — TELEPHONE ENCOUNTER
Outgoing call placed to family. Spoke to daughter, Reece Ruiz (sp?). Informed daughter of urine culture results. Abx sent to ovidio. Follow up call to [facility] pending for long term in-dwelling gramajo catheter plan/indication.

## 2018-07-12 NOTE — PROGRESS NOTES
Home Based Primary Care & Supportive Services   (previously: At Home)  [phone]    Interdisciplinary Note    [facility] Team Members: Dr. Lennox Pen, NP; Ines Harrell RN; Arminda Cancino, RN, Myriam Jiménez RN, Osvaldo Wagoner PSR    Diagnosis:    Current status summary:    Medication Management:    Last admission/ED visit:     Last Home Based Primary Care visit:    Action Items:  1. Joy Hood NP will follow up with Urologist on catheter  2.  [facility]            Advance Care Planning:  Advance Care Planning 9/26/2016   Patient's Healthcare Decision Maker is: Verbal statement (Legal Next of Kin remains as decision maker)   Primary Decision Maker Name [address] [place] [address]   Primary Decision Maker Phone Number [phone]   Primary Decision Maker Relationship to Patient Adult child   Confirm Advance Directive None   Patient Would Like to Complete Advance Directive No       Health Maintenance:  Health Maintenance Due   Topic Date Due    DTaP/Tdap/Td series (1 - Tdap) 04/30/1955    ZOSTER VACCINE AGE 60>  02/28/1994    GLAUCOMA SCREENING Q2Y  04/30/1999    Pneumococcal 65+ Low/Medium Risk (2 of 2 - PCV13) 05/11/2014    MEDICARE YEARLY EXAM  03/14/2018

## 2018-07-16 NOTE — PROGRESS NOTES
Orders faxed to Brett Matos, for tube transfer bench, c cylinders, and Oxygen conserving device , confirmation received

## 2018-07-17 NOTE — PROGRESS NOTES
Home Based [address] [place] [employer]   [address] [place] [address]  [phone]    RN Nursing Visit Note    Date of current visit: 07/17/18   Date of initial visit: 7/2/18    Diagnosis: Intracerebral bleed (Aug 2014), interstitial lung disease (on O2 at 2L), HTN, compression fx L4 vertebra Feb 2016 with back pain, GERD, renal calculi (chronic gramajo), recurrent UTI's    ACP:  Advance Care Planning 9/26/2016   Patient's [address] is: Verbal statement (Legal Next of Kin remains as decision maker)   Primary Decision Maker Name SA WOMACK   Primary Decision Maker Phone Number [phone]   Primary Decision Maker Relationship to Patient Adult child   Confirm Advance Directive None   Patient Would Like to Complete Advance Directive No       Nursing Assessment: Trisha Barnett, 80year old [place] female seen today for 2 week follow up visit. Team today included Raina Meléndez, NP and myself. Upon arrival patient laying in hospital bed that arrived yesterday. Patient's daughter present during visit, but does not speak much [place]. Translation line used today during visit [phone]. Patient reported headache, and rated pain 2/10 on pain scale. Primary Caregiver:  Alayna Desir, Daughter - speaks some English    Social: One story home, 3 steps to enter, currently no ramp. Home Environment:  -Ramp:  Per PT notes, pt applied to [employer] in May 2018 and is on the list to have a ramp built.  -Fire Safety: Smoke alarms present in the home    Function: ambulates minimally with rollator  -Fall Risk: Yes    DME/Equipment:  [address] [facility]":  Mon-Fri (9-2 pm) -[place] [facility]    Nutrition:  Wt Readings from Last 3 Encounters:   07/02/18 77 lb 6.4 oz (35.1 kg)   08/13/17 88 lb (39.9 kg)   03/21/17 88 lb (39.9 kg)     : patient is continent of bladder, but wears attends during the day.   Gramajo cath in tact       Visit Vitals    /77 (BP 1 Location: Left arm, BP Patient Position: Supine)    Pulse 88    Temp 99.7 °F (37.6 °C) (Temporal)    Resp 16    SpO2 95%     MUAC  Left = 20 cm  Right = 20.1 cm    Medication Management:  Current Outpatient Prescriptions   Medication Sig    ibuprofen (ADVIL) 200 mg tablet Take  by mouth.  amoxicillin-clavulanate (AUGMENTIN) 875-125 mg per tablet Take 1 Tab by mouth every twelve (12) hours for 10 days. Indications: E. COLI URINARY TRACT INFECTION    albuterol-ipratropium (DUO-NEB) 2.5 mg-0.5 mg/3 ml nebu INHALE 3 ML VIA NEBULIZER TWICE DAILY    OXYGEN-AIR DELIVERY SYSTEMS Take 2 L/min by inhalation continuous.  metoprolol tartrate (LOPRESSOR) 25 mg tablet Take 25 mg by mouth two (2) times a day.  gabapentin (NEURONTIN) 100 mg capsule Take 100 mg by mouth three (3) times daily.  amLODIPine (NORVASC) 5 mg tablet Take 1 Tab by mouth daily.  omeprazole (PRILOSEC) 20 mg capsule Take 20 mg by mouth daily.  acetaminophen (TYLENOL) 325 mg tablet Take 2 Tabs by mouth every four (4) hours as needed for Fever. No current facility-administered medications for this visit. Plan:  1. NP recommended HH change Gramajo cath as patient is being treated for UTI  2. NP will contact urologist to discuss a gramajo trial and possible removal  3. NP recommended taking Advil 200 mg every hours prn for headache  4.  NP recommended taking Tylenol 1000 mg ever hours PRN for headache    Follow Up Visit:  4 week visit 8/15/18

## 2018-07-17 NOTE — MR AVS SNAPSHOT
[facility] [address] [facility] 
[phone] Patient: Tello Ugalde 
MRN: [id number] Visit Information Date & Time Provider Department Dept. Phone Encounter #  
 7/17/2018  2:30 PM Zac Parker NP [address] [facility] [place] [phone] [id number] Follow-up Instructions Return in about 4 weeks (around 8/14/2018), or if symptoms worsen or fail to improve, for Regular Follow-up. Follow-up and Disposition History Upcoming Health Maintenance Date Due DTaP/Tdap/Td series (1 - Tdap) 4/30/1955 ZOSTER VACCINE AGE 60> 2/28/1994 GLAUCOMA SCREENING Q2Y 4/30/1999 Pneumococcal 65+ Low/Medium Risk (2 of 2 - PCV13) 5/11/2014 MEDICARE YEARLY EXAM 3/14/2018 Influenza Age 5 to Adult 8/1/2018 Allergies as of 7/17/2018  Review Complete On: 7/17/2018 By: Zac Parker NP Severity Noted Reaction Type Reactions Aspirin  05/10/2013   Side Effect Nausea Only Current Immunizations  Reviewed on 2/20/2016 Name Date Pneumococcal Polysaccharide (PPSV-23) 5/11/2013  2:03 PM  
  
 Not reviewed this visit You Were Diagnosed With   
  
 Codes Comments IPF (idiopathic pulmonary fibrosis) ([facility]    -  Primary ICD-10-CM: C60.473 ICD-9-CM: 516.31 Acute cystitis without hematuria     ICD-10-CM: N30.00 ICD-9-CM: 595.0 Chronic indwelling Chambers catheter     ICD-10-CM: Z92.89 ICD-9-CM: V45.89 Dependence on continuous supplemental oxygen     ICD-10-CM: Z99.81 ICD-9-CM: V46.2 Vitals BP Pulse Temp Resp SpO2 OB Status 138/77 (BP 1 Location: Left arm, BP Patient Position: Supine) 88 99.7 °F (37.6 °C) (Temporal) 16 95% Postmenopausal  
 Smoking Status Never Smoker Preferred Pharmacy Pharmacy Name Phone  [facility] [place] [facility] [phone] Your Updated Medication List  
  
   
This list is accurate as of 7/17/18 11:59 PM.  Always use your most recent med list.  
  
  
  
  
 acetaminophen 325 mg tablet Commonly known as:  TYLENOL Take 2 Tabs by mouth every four (4) hours as needed for Fever. ADVIL 200 mg tablet Generic drug:  ibuprofen Take  by mouth. albuterol-ipratropium 2.5 mg-0.5 mg/3 ml Nebu Commonly known as:  Barabara Peoples INHALE 3 ML VIA NEBULIZER TWICE DAILY  
  
 amLODIPine 5 mg tablet Commonly known as:  Kemi Darting Take 1 Tab by mouth daily. amoxicillin-clavulanate 875-125 mg per tablet Commonly known as:  AUGMENTIN Take 1 Tab by mouth every twelve (12) hours for 10 days. Indications: E. COLI URINARY TRACT INFECTION  
  
 gabapentin 100 mg capsule Commonly known as:  NEURONTIN Take 100 mg by mouth three (3) times daily. metoprolol tartrate 25 mg tablet Commonly known as:  LOPRESSOR Take 25 mg by mouth two (2) times a day. omeprazole 20 mg capsule Commonly known as:  PRILOSEC Take 20 mg by mouth daily. OXYGEN-AIR DELIVERY SYSTEMS Take 2 L/min by inhalation continuous. Follow-up Instructions Return in about 4 weeks (around 8/14/2018), or if symptoms worsen or fail to improve, for Regular Follow-up. To-Do List   
 07/25/2018 To Be Determined Appointment with Carline Johns at [place] [address]  
  
 08/01/2018 To Be Determined Appointment with Carline Johns at [place] [address]  
  
 08/08/2018 To Be Determined Appointment with Carline Johns at [place] [address]  
  
 08/13/2018 To Be Determined Appointment with Carline Johns at [place] [address] Patient Instructions Dear Marcell Cheema , It was a pleasure seeing you at home today with Home Based [facility] ([phone]) Your stated goal: To breath better at night 1. UTI 
- You have a urinary tract infection - An antibiotic was prescribed for you and started on 7/7/18 - There is about 2 days left of taking this medication - You are running a low grade temperature - Tylenol 1000mg every 8 hours as needed for pain or fever may be used - We will follow up with home health to find out when the catheter was last changed 2. Headaches - You occasionally have headaches - The locations vary but today you have a headache on the left side of the head - There are no vision changes or other neurologic symptoms with your headache - Right now you are using Advil as needed - You may take Advil 2 tabs every 8 hours as needed for pain/headache 3. Urine Catheter 
-Home Health changes your urine catheter every month. - We will contact home health to follow up on when the catheter was last changed 
-We have received records from [facility] - A call has been place to them to discuss keeping the urine catheter in or if it is able to be removed 4. Health Maintenance - We will work on this over the next couple visits Health Maintenance Due Topic Date Due  
 DTaP/Tdap/Td series (1 - Tdap) 04/30/1955  ZOSTER VACCINE AGE 60>  02/28/1994  GLAUCOMA SCREENING Q2Y  04/30/1999  Pneumococcal 65+ Low/Medium Risk (2 of 2 - PCV13) 05/11/2014  MEDICARE YEARLY EXAM  03/14/2018 5. Advance Care Planning This is what you have shared with us about Advance Care Planning You currently do not have any documents that indicate your health care wishes Advance Care Planning 9/26/2016 Patient's Healthcare Decision Maker is: Verbal statement (Legal Next of Kin remains as decision maker) Primary Decision Maker Name  UV Primary Decision Maker Phone Number [phone] Primary Decision Maker Relationship to Patient Adult child Confirm Advance Directive None Patient Would Like to Complete Advance Directive No  
 
 Someone from the Home Based Primary Care Team will see you again in 4 weeks If there are any concerns before that time, such as medication questions, worsening symptoms or a need to see a physician for an urgent or emergent situation; please call [phone]. A physician is also on call after our normal business hours of 8am to 5pm.  
 
In order to serve you better, please allow up to 48 hours for prescription refills to be processed. Certain medications may require more paperwork or a written prescription that you may need to  from the office. We appreciate you letting us know of any refill requests as soon as possible. Sincerely, The Home Based Primary Care Team 
MD Rebecca Houston, EVANGELIST Ingram RN Patient Instructions History Introducing [facility]! [facility] introduces [employer] patient portal. Now you can access parts of your medical record, email your doctor's office, and request medication refills online. 1. In your internet browser, go to [email or web address] 2. Click on the First Time User? Click Here link in the Sign In box. You will see the New Member Sign Up page. 3. Enter your BUX Access Code exactly as it appears below. You will not need to use this code after youve completed the sign-up process. If you do not sign up before the expiration date, you must request a new code. · BUX Access Code: [email or web address] Expires: 9/16/2018  7:48 AM 
 
4. Enter the last four digits of your Social Security Number (xxxx) and Date of Birth (mm/dd/yyyy) as indicated and click Submit. You will be taken to the next sign-up page. 5. Create a BUX ID. This will be your BUX login ID and cannot be changed, so think of one that is secure and easy to remember. 6. Create a BUX password. You can change your password at any time. 7. Enter your Password Reset Question and Answer.  This can be used at a later time if you forget your password. 8. Enter your e-mail address. You will receive e-mail notification when new information is available in [address] 9. Click Sign Up. You can now view and download portions of your medical record. 10. Click the Download Summary menu link to download a portable copy of your medical information. If you have questions, please visit the Frequently Asked Questions section of the [employer] website. Remember, [employer] is NOT to be used for urgent needs. For medical emergencies, dial 911. Now available from your iPhone and Android! Please provide this summary of care documentation to your next provider. Your primary care clinician is listed as Nikki Cason. If you have any questions after today's visit, please call [phone]. - - -

## 2018-07-17 NOTE — PATIENT INSTRUCTIONS
Dear Shimon Cruz ,    It was a pleasure seeing you at home today with Home Based [facility] Care ([phone])    Your stated goal: To breath better at night    1. UTI  - You have a urinary tract infection  - An antibiotic was prescribed for you and started on 7/7/18  - There is about 2 days left of taking this medication  - You are running a low grade temperature  - Tylenol 1000mg every 8 hours as needed for pain or fever may be used  - We will follow up with home health to find out when the catheter was last changed    2. Headaches  - You occasionally have headaches  - The locations vary but today you have a headache on the left side of the head  - There are no vision changes or other neurologic symptoms with your headache  - Right now you are using Advil as needed  - You may take Advil 2 tabs every 8 hours as needed for pain/headache     3. Urine Catheter  -Home Health changes your urine catheter every month. - We will contact home health to follow up on when the catheter was last changed  -We have received records from [facility]  - A call has been place to them to discuss keeping the urine catheter in or if it is able to be removed    4. Health Maintenance  - We will work on this over the next couple visits    Health Maintenance Due   Topic Date Due    DTaP/Tdap/Td series (1 - Tdap) 04/30/1955    ZOSTER VACCINE AGE 60>  02/28/1994    GLAUCOMA SCREENING Q2Y  04/30/1999    Pneumococcal 65+ Low/Medium Risk (2 of 2 - PCV13) 05/11/2014    MEDICARE YEARLY EXAM  03/14/2018     5.  Advance Care Planning     This is what you have shared with us about Advance Care Planning  You currently do not have any documents that indicate your health care wishes    Advance Care Planning 9/26/2016   Patient's Healthcare Decision Maker is: Verbal statement (Legal Next of Kin remains as decision maker)   Primary Decision Maker Name  VU   Primary Decision Maker Phone Number [phone]   Primary Decision Maker Relationship to Patient Adult child   Confirm Advance Directive None   Patient Would Like to Complete Advance Directive No     Someone from the Home Based Primary Care Team will see you again in 4 weeks    If there are any concerns before that time, such as medication questions, worsening symptoms or a need to see a physician for an urgent or emergent situation; please call [phone]. A physician is also on call after our normal business hours of 8am to 5pm.     In order to serve you better, please allow up to 48 hours for prescription refills to be processed. Certain medications may require more paperwork or a written prescription that you may need to  from the office. We appreciate you letting us know of any refill requests as soon as possible.     Sincerely,    The Home Based Primary Care Team  MD Tate Adames NP Afton Bard, RN

## 2018-07-18 PROBLEM — Z99.81 DEPENDENCE ON CONTINUOUS SUPPLEMENTAL OXYGEN: Status: ACTIVE | Noted: 2018-01-01

## 2018-07-18 NOTE — PROGRESS NOTES
Home Based [address] [facility]   [id number]      Date of Current Visit: 07/17/18     SUMMARY:   For full summary of patient's condition, please see Home Based Primary Care initial visit note on 7/2/18. This patient's chronic conditions including an intracerebral bleed (Aug 2014), interstitial lung disease with dependence on continuous oxygen and compression fracture of L4have resulted in the inability to leave the home to receive primary medical care without a significant taxing effort. Today we are visiting the patient for the following reason UTI, debility, headache     This patient's goals of care are: To breath better at night    This patient's resuscitation status is:  [x] Attempt Resuscitation       [] Do Not Attempt Resuscitation    Advance Care Planning 9/26/2016   Patient's Healthcare Decision Maker is: Verbal statement (Legal Next of Kin remains as decision maker)   Primary Decision Maker Name [facility]   Primary Decision Maker Phone Number [phone]   Primary Decision Maker Relationship to Patient Adult child   Confirm Advance Directive None   Patient Would Like to Complete Advance Directive No        DIAGNOSES & PLAN:       ICD-10-CM ICD-9-CM    1. IPF (idiopathic pulmonary fibrosis) ([facility] J84.112 516.31    2. Acute cystitis without hematuria N30.00 595.0    3. Chronic indwelling Chambers catheter Z92.89 V45.89    4. Dependence on continuous supplemental oxygen Z99.81 V46.2        Patient Instructions     Dear Dolly Rivas ,    It was a pleasure seeing you at home today with Home Based Primary Care ([phone])    Your stated goal: To breath better at night    1.  UTI  - You have a urinary tract infection  - An antibiotic was prescribed for you and started on 7/7/18  - There is about 2 days left of taking this medication  - You are running a low grade temperature  - Tylenol 1000mg every 8 hours as needed for pain or fever may be used  - We will follow up with home health to find out when the catheter was last changed    2. Headaches  - You occasionally have headaches  - The locations vary but today you have a headache on the left side of the head  - There are no vision changes or other neurologic symptoms with your headache  - Right now you are using Advil as needed  - You may take Advil 2 tabs every 8 hours as needed for pain/headache     3. Urine Catheter  -Home Health changes your urine catheter every month. - We will contact home health to follow up on when the catheter was last changed  -We have received records from [facility]  - A call has been place to them to discuss keeping the urine catheter in or if it is able to be removed    4. Health Maintenance  - We will work on this over the next couple visits    Health Maintenance Due   Topic Date Due    DTaP/Tdap/Td series (1 - Tdap) 04/30/1955    ZOSTER VACCINE AGE 60>  02/28/1994    GLAUCOMA SCREENING Q2Y  04/30/1999    Pneumococcal 65+ Low/Medium Risk (2 of 2 - PCV13) 05/11/2014    MEDICARE YEARLY EXAM  03/14/2018     5. Advance Care Planning     This is what you have shared with us about Advance Care Planning  You currently do not have any documents that indicate your health care wishes    Advance Care Planning 9/26/2016   Patient's Healthcare Decision Maker is: Verbal statement (Legal Next of Kin remains as decision maker)   Primary Decision Maker Name SA WOMACK   Primary Decision Maker Phone Number [phone]   Primary Decision Maker Relationship to Patient Adult child   Confirm Advance Directive None   Patient Would Like to Complete Advance Directive No     Someone from the Home Based Primary Care Team will see you again in 4 weeks    If there are any concerns before that time, such as medication questions, worsening symptoms or a need to see a physician for an urgent or emergent situation; please call [phone].  A physician is also on call after our normal business hours of 8am to 5pm.     In order to serve you better, please allow up to 48 hours for prescription refills to be processed. Certain medications may require more paperwork or a written prescription that you may need to  from the office. We appreciate you letting us know of any refill requests as soon as possible. Sincerely,    The Home Based Primary Care Team  MD Rafa Gonzalez, EVANGELIST Galvez RN       HISTORY:   HISTORY OBTAINED FROM: patient and patients daughter  Using [employer]  line    CHIEF COMPLAINT:   Chief Complaint   Patient presents with    Bladder Infection    Urinary Catheter Problem       HPI/SUBJECTIVE:     F/U UTI, antibiotic was prescribed and started on 7/7/18  occasionally have headaches, The locations vary but today it is on the left side of the head  no vision changes or other neurologic symptoms with your headache,using Advil as needed  Hospital bed was delivered yesterday    Recent Fall: [] No / [] Yes (when):    Last bowel movement:      REVIEW OF SYSTEMS:     The following systems were [x] reviewed / [] unable to be reviewed  Systems: constitutional, ears/nose/mouth/throat, respiratory, gastrointestinal, genitourinary, musculoskeletal, integumentary, neurologic, psychiatric, endocrine. Positive findings noted below: headache     VITAL SIGNS, PHYSICAL EXAM & FUNCTIONAL ASSESSMENT     Vital Signs: Wt Readings from Last 3 Encounters:   07/02/18 77 lb 6.4 oz (35.1 kg)   08/13/17 88 lb (39.9 kg)   03/21/17 88 lb (39.9 kg)     Temp Readings from Last 3 Encounters:   07/26/18 99 °F (37.2 °C) (Temporal)   07/18/18 98.6 °F (37 °C) (Temporal)   07/17/18 99.7 °F (37.6 °C) (Temporal)     BP Readings from Last 3 Encounters:   07/26/18 120/60   07/18/18 120/80   07/17/18 138/77     Pulse Readings from Last 3 Encounters:   07/26/18 91   07/18/18 71   07/17/18 88       Physical Exam:    Constitutional:  female, adv.  Age, NAD, laying in hospital bed  Eyes: pupils equal, anicteric  ENMT: no nasal discharge, moist mucous membranes  Cardiovascular: regular rhythm, distal pulses intact  Respiratory: breathing not labored, symmetric, wearing supplement O2 NC, distant  Gastrointestinal: soft non-tender, +bowel sounds  Musculoskeletal: no deformity, no tenderness to palpation  Skin: warm, dry  Neurologic: following commands, moving all extremities  Psychiatric: normal affect, no hallucinations  Other:    ADDENDUM:  Room Air at rest - 91%   Ambulating Room Air - 82%   2 Liters O2 at rest - 98%   Ambulation on 2 Liters O2- 98%     DME Recommendations for home oxygen w/ concentrator, o2 tanks and associated supplies. Oxygen per NC @ 2L and titrate to keep O2 sts >92%    Functional Assessment (description):    Palliative Performance Scale:   Timed Up and Go (TUG):   No flowsheet data found. LAB DATA REVIEWED:     Lab Results   Component Value Date/Time    Hemoglobin A1c 5.0 09/01/2014 03:54 AM     No results found for: MCACR, MCA1, MCA2, MCA3, MCAU, MCAU2, MCALPOCT  Lab Results   Component Value Date/Time    TSH 2.460 07/02/2018 12:06 PM     Lab Results   Component Value Date/Time    VITAMIN D, 25-HYDROXY 21.9 (L) 07/02/2018 12:06 PM         Lab Results   Component Value Date/Time    WBC 9.2 07/02/2018 12:06 PM    HGB 11.6 07/02/2018 12:06 PM    PLATELET 757 82/91/0297 12:06 PM     Lab Results   Component Value Date/Time    Sodium 143 07/02/2018 12:06 PM    Potassium 4.2 07/02/2018 12:06 PM    Chloride 99 07/02/2018 12:06 PM    CO2 16 (L) 07/02/2018 12:06 PM    BUN 11 07/02/2018 12:06 PM    Creatinine 0.66 07/02/2018 12:06 PM    Calcium 8.7 07/02/2018 12:06 PM    Magnesium 1.8 07/18/2016 02:34 AM    Phosphorus 2.8 07/18/2016 02:34 AM      Lab Results   Component Value Date/Time    AST (SGOT) 53 (H) 07/02/2018 12:06 PM    Alk.  phosphatase 122 (H) 07/02/2018 12:06 PM    Protein, total 7.2 07/02/2018 12:06 PM    Albumin 3.7 07/02/2018 12:06 PM    Globulin 3.7 04/05/2017 12:05 PM     Lab Results   Component Value Date/Time    Iron 34 (L) 05/05/2014 02:50 AM    TIBC 251 05/05/2014 02:50 AM    Iron % saturation 14 (L) 05/05/2014 02:50 AM    Ferritin 90 05/05/2014 02:50 AM         MEDICATIONS & PRESCRIPTIONS     Allergies   Allergen Reactions    Aspirin Nausea Only      Current Outpatient Prescriptions   Medication Sig    ibuprofen (ADVIL) 200 mg tablet Take  by mouth.  albuterol-ipratropium (DUO-NEB) 2.5 mg-0.5 mg/3 ml nebu INHALE 3 ML VIA NEBULIZER TWICE DAILY    OXYGEN-AIR DELIVERY SYSTEMS Take 2 L/min by inhalation continuous.  metoprolol tartrate (LOPRESSOR) 25 mg tablet Take 25 mg by mouth two (2) times a day.  gabapentin (NEURONTIN) 100 mg capsule Take 100 mg by mouth three (3) times daily.  amLODIPine (NORVASC) 5 mg tablet Take 1 Tab by mouth daily.  omeprazole (PRILOSEC) 20 mg capsule Take 20 mg by mouth daily.  acetaminophen (TYLENOL) 325 mg tablet Take 2 Tabs by mouth every four (4) hours as needed for Fever. No current facility-administered medications for this visit. No orders of the defined types were placed in this encounter.          Total time: 35min  Counseling / coordination time: 5min - coordination of care, contact with [employer] translation line  > 50% counseling / coordination?:

## 2018-07-19 NOTE — TELEPHONE ENCOUNTER
Call placed to [facility] to who had no idea they are servicing patients O2 , LPN has to order C-Cylinders and conserving devices however can not order until patients has billed corrected with DME provider, Mckenna SANTOYO will call the billing department for family and then request for [place]  for family to clarify bill.

## 2018-07-20 NOTE — PROGRESS NOTES
[facility]  requests C-cylinders and concentrator for patients Oxygen, patient currently has O2 in the home supplied by [employer] [facility] informs patient has a balance with them for over $800, Mark Torres reports they have placed multiple call to family however due to breakdown in communication as the patient and family speaks little english they have not been able to resolve issues, patient has Medicare and Medicaid and the O2 can be paid for without balance however because they can not reach the original physician who signed order for O2 [employer] cannot get signed orders to bill insurance prohibiting additional supplies to be added to patients order and causing balance. Per Mark Torres we can restart order from the beginning with O2 testing, documentation and orders under HB to add additional supplies requested by [facility], please advise on patients next visit to start process to accommodate patients needs.       Per NP message sent to [address] [place] [address] and Clara Harris OT to assist with getting O2 Saturation numbers during next visit

## 2018-07-31 NOTE — TELEPHONE ENCOUNTER
V/m left for Rebeca Huitron to return call, new orders faxed over under Dr Magan Harris to take over O2 orders

## 2018-08-01 NOTE — TELEPHONE ENCOUNTER
Sergio Gerardo with [facility] is calling asking for [facility] to sign, initial , date chart notes and fax back to [phone].

## 2018-08-03 NOTE — TELEPHONE ENCOUNTER
[address] will now take over patients O2 and supplies, Heriberto Conklin has been requested to  supplies , Call to [facility] who informs they have already delivered O2

## 2018-08-06 NOTE — TELEPHONE ENCOUNTER
Caller asking to speak with Jenifer regarding oxygen orders. He said the documents he sent were not for new orders but for the previous oxygen they had provided the pt.  Caller requesting a return call

## 2018-08-08 NOTE — TELEPHONE ENCOUNTER
Caller is requesting a return call.  Caller asking to speak with Jenifer regarding the previous message that was left on 8/6/18

## 2018-08-13 NOTE — TELEPHONE ENCOUNTER
Tracy patients caregiver reports she was on vacation last week and now that she is back she reports patient is not eating much , and last week she did not do her exercises given to her by [facility]  when the other Caregivers where there and she does not want to get up and move around much like she has prevoisly. No complaints of temperature or SOB, reports she was able to get her up for the morning and she is sitting up.  Advised Tracy to encourage patient to eat and try exercises later as tolerated , She verbalized understanding, aware of appointment Wednesday with HB team and [facility] appointment mattie

## 2018-08-13 NOTE — TELEPHONE ENCOUNTER
Bernice stated she was out on vacation last week and this morning the pt is reporting she is not feeling well and she hasn't been eating much

## 2018-08-14 NOTE — TELEPHONE ENCOUNTER
Return call to [facility] today who informs she is doing a lot better today, she is ding some exercises today and sitting up with out problem, CG aware HB Team will be out tomorrow

## 2018-08-15 NOTE — MR AVS SNAPSHOT
[address] , [address] 
[phone] Patient: Renee Quintanilla 
MRN: [id number] OEH:9/04/1946 Visit Information Date & Time Provider Department Dept. Phone Encounter #  
 8/15/2018  9:30 AM Rachel Gates NP [facility] [phone] [id number] Follow-up Instructions Return in about 4 weeks (around 9/12/2018), or if symptoms worsen or fail to improve, for Regular Follow-up. Follow-up and Disposition History Upcoming Health Maintenance Date Due DTaP/Tdap/Td series (1 - Tdap) 4/30/1955 ZOSTER VACCINE AGE 60> 2/28/1994 GLAUCOMA SCREENING Q2Y 4/30/1999 Pneumococcal 65+ Low/Medium Risk (2 of 2 - PCV13) 5/11/2014 Influenza Age 5 to Adult 8/1/2018 MEDICARE YEARLY EXAM 8/16/2019 Allergies as of 8/15/2018  Review Complete On: 8/15/2018 By: Rachel Gates NP Severity Noted Reaction Type Reactions Aspirin  05/10/2013   Side Effect Nausea Only Current Immunizations  Reviewed on 2/20/2016 Name Date Pneumococcal Polysaccharide (PPSV-23) 5/11/2013  2:03 PM  
  
 Not reviewed this visit You Were Diagnosed With   
  
 Codes Comments Advance care planning    -  Primary ICD-10-CM: Z71.89 ICD-9-CM: V65.49 Initial Medicare annual wellness visit     ICD-10-CM: Z00.00 ICD-9-CM: V70.0 Screening for alcoholism     ICD-10-CM: Z13.89 ICD-9-CM: V79.1 Screening for depression     ICD-10-CM: Z13.89 ICD-9-CM: V79.0 IPF (idiopathic pulmonary fibrosis) ([facility]     ICD-10-CM: T39.254 ICD-9-CM: 516.31 Debility     ICD-10-CM: R53.81 ICD-9-CM: 799.3 Vitals BP Pulse Temp Resp SpO2 OB Status 127/67 (BP 1 Location: Left arm, BP Patient Position: Sitting) 94 98.8 °F (37.1 °C) (Temporal) 18 99% Postmenopausal  
 Smoking Status Never Smoker Vitals History Preferred Pharmacy Pharmacy Name Phone [facility] [phone] Your Updated Medication List  
  
   
This list is accurate as of 8/15/18 11:59 PM.  Always use your most recent med list.  
  
  
  
  
 acetaminophen 325 mg tablet Commonly known as:  TYLENOL Take 2 Tabs by mouth every four (4) hours as needed for Fever. ADVIL 200 mg tablet Generic drug:  ibuprofen Take  by mouth. albuterol-ipratropium 2.5 mg-0.5 mg/3 ml Nebu Commonly known as:  Everrett Roxanne INHALE 3 ML VIA NEBULIZER TWICE DAILY  
  
 amLODIPine 5 mg tablet Commonly known as:  Mariana Gables Take 1 Tab by mouth daily. gabapentin 100 mg capsule Commonly known as:  NEURONTIN Take 100 mg by mouth three (3) times daily. metoprolol tartrate 25 mg tablet Commonly known as:  LOPRESSOR Take 25 mg by mouth two (2) times a day. omeprazole 20 mg capsule Commonly known as:  PRILOSEC Take 20 mg by mouth daily. OXYGEN-AIR DELIVERY SYSTEMS Take 2 L/min by inhalation continuous. We Performed the Following Tejas [address] [[id number] [facility]] [place] ANNUAL ALCOHOL SCREEN 15 MIN [id number] [facility]] [address] Comments:  
 PT/OT eval and treat for increased debility/weakness Follow-up Instructions Return in about 4 weeks (around 9/12/2018), or if symptoms worsen or fail to improve, for Regular Follow-up. Referral Information Referral ID Referred By Referred To  
  
 [id number] Tiffany CLAIRE 68   
   [facility].   
   [address], [address] Phone: [phone] Fax: [phone] Visits Status Start Date End Date 1 Closed 8/15/18 8/15/19 If your referral has a status of pending review or denied, additional information will be sent to support the outcome of this decision. Patient Instructions Dear Farzana Garzon , 
 
 It was a pleasure seeing you at home today with [facility] ([phone]) Your stated goal: To breath better at night 1. Well visit - Today a well visit was completed - No labs were required for todays visit - You are doing well on todays visit 2. Weakness - Your private caregiver was away last week 
- Since she has been back, she has noticed you are more weak and able to do less than before - This is likely due to being in bed more and less active last week - Physical Therapy was completed. - Since there has been a decline, we will see if they can resume therapy services - A referral to [address] [place] [address] Ifeanyi Chávez for therapy will be sent - You were too weak to stand on the scale to check your weight 3. Appetite - Your caregiver and daughter report that your appetite has decreased since last week - Your daughter shared you felt warm but did not have a way to check your temperature - Advil is being used 1 tablet every 4 hours as needed for pain. 
-We encourage you to stay hydrated as much as possible 
- Small snacks as tolerated are also encouraged - Please let us know if this does not improve this week - We may also have the dietician come out in the near future 4. Health Maintenance - Vaccine counseling provided today - You are agreeable to update the vaccines - We will arrange [facility] to come to the home to give the vaccines - You have agreed to the out of pocket cost for the TDap vaccine Health Maintenance Due Topic Date Due  
 DTaP/Tdap/Td series (1 - Tdap) 04/30/1955  ZOSTER VACCINE AGE 60>  02/28/1994  GLAUCOMA SCREENING Q2Y  04/30/1999  Pneumococcal 65+ Low/Medium Risk (2 of 2 - PCV13) 05/11/2014  Influenza Age 5 to Adult  08/01/2018 5. IPF (Pulmonary Fibrosis) - You wear continuous oxygen - Your breathing is stable 6. Advance Care Planning - Today, with the use of the [place] , we completed Advance Directives - We also discussed code status and you wish to be a FULL CODE Advance Care Planning 9/26/2016 Patient's Healthcare Decision Maker is: Verbal statement (Legal Next of Kin remains as decision maker) Primary Decision Maker Name SA WOMACK Primary Decision Maker Phone Number [phone] Primary Decision Maker Relationship to Patient Adult child Confirm Advance Directive None Patient Would Like to Complete Advance Directive No  
 
Someone from the Home Based Primary Care Team will see you again in 4 weeks If there are any concerns before that time, such as medication questions, worsening symptoms or a need to see a physician for an urgent or emergent situation; please call [phone]. A physician is also on call after our normal business hours of 8am to 5pm.  
 
In order to serve you better, please allow up to 48 hours for prescription refills to be processed. Certain medications may require more paperwork or a written prescription that you may need to  from the office. We appreciate you letting us know of any refill requests as soon as possible. Sincerely, The Home Based Primary Care Team 
MD Blake Christianson NP Marjean Minister, RN Medicare Wellness Visit, Female The best way to live healthy is to have a lifestyle where you eat a well-balanced diet, exercise regularly, limit alcohol use, and quit all forms of tobacco/nicotine, if applicable. Regular preventive services are another way to keep healthy. Preventive services (vaccines, screening tests, monitoring & exams) can help personalize your care plan, which helps you manage your own care. Screening tests can find health problems at the earliest stages, when they are easiest to treat. [facility] follows the current, evidence-based guidelines published by the [place] (USPSTF) when recommending preventive services for our patients.  Because we follow these guidelines, sometimes recommendations change over time as research supports it. (For example, mammograms used to be recommended annually. Even though Medicare will still pay for an annual mammogram, the newer guidelines recommend a mammogram every two years for women of average risk.) Of course, you and your doctor may decide to screen more often for some diseases, based on your risk and your health status. Preventive services for you include: - Medicare offers their members a free annual wellness visit, which is time for you and your primary care provider to discuss and plan for your preventive service needs. Take advantage of this benefit every year! 
-All adults over the age of 72 should receive the recommended pneumonia vaccines. Current USPSTF guidelines recommend a series of two vaccines for the best pneumonia protection.  
-All adults should have a flu vaccine yearly and a tetanus vaccine every 10 years. All adults age 61 and older should receive a shingles vaccine once in their lifetime.   
-A bone mass density test is recommended when a woman turns 65 to screen for osteoporosis. This test is only recommended one time, as a screening. Some providers will use this same test as a disease monitoring tool if you already have osteoporosis. -All adults age 38-68 who are overweight should have a diabetes screening test once every three years.  
-Other screening tests and preventive services for persons with diabetes include: an eye exam to screen for diabetic retinopathy, a kidney function test, a foot exam, and stricter control over your cholesterol.  
-Cardiovascular screening for adults with routine risk involves an electrocardiogram (ECG) at intervals determined by your doctor.  
-Colorectal cancer screenings should be done for adults age 54-65 with no increased risk factors for colorectal cancer. There are a number of acceptable methods of screening for this type of cancer.  Each test has its own benefits and drawbacks. Discuss with your doctor what is most appropriate for you during your annual wellness visit. The different tests include: colonoscopy (considered the best screening method), a fecal occult blood test, a fecal DNA test, and sigmoidoscopy. -Breast cancer screenings are recommended every other year for women of normal risk, age 54-69. 
-Cervical cancer screenings for women over age 72 are only recommended with certain risk factors.  
-All adults born between [facility] and [facility] should be screened once for Hepatitis C. Here is a list of your current Health Maintenance items (your personalized list of preventive services) with a due date: 
Health Maintenance Due Topic Date Due  
 DTaP/Tdap/Td  (1 - Tdap) 04/30/1955  Shingles Vaccine  02/28/1994  Glaucoma Screening   04/30/1999  Pneumococcal Vaccine (2 of 2 - PCV13) 05/11/2014  Flu Vaccine  08/01/2018 Patient Instructions History Introducing [facility]! Jm Acevedo introduces "[employer] patient portal. Now you can access parts of your medical record, email your doctor's office, and request medication refills online. 1. In your internet browser, go to [email or web address] 2. Click on the First Time User? Click Here link in the Sign In box. You will see the New Member Sign Up page. 3. Enter your "[employer] Access Code exactly as it appears below. You will not need to use this code after youve completed the sign-up process. If you do not sign up before the expiration date, you must request a new code. · "[employer] Access Code: [email or web address] Expires: 9/16/2018  7:48 AM 
 
4. Enter the last four digits of your Social Security Number (xxxx) and Date of Birth (mm/dd/yyyy) as indicated and click Submit. You will be taken to the next sign-up page. 5. Create a "[employer] ID. This will be your "[employer] login ID and cannot be changed, so think of one that is secure and easy to remember. 6. Create a [employer] password. You can change your password at any time. 7. Enter your Password Reset Question and Answer. This can be used at a later time if you forget your password. 8. Enter your e-mail address. You will receive e-mail notification when new information is available in [address] 9. Click Sign Up. You can now view and download portions of your medical record. 10. Click the Download Summary menu link to download a portable copy of your medical information. If you have questions, please visit the Frequently Asked Questions section of the [employer] website. Remember, [employer] is NOT to be used for urgent needs. For medical emergencies, dial 911. Now available from your iPhone and Android! Please provide this summary of care documentation to your next provider. Your primary care clinician is listed as Mauricio Corrales. If you have any questions after today's visit, please call [phone].

## 2018-08-15 NOTE — PROGRESS NOTES
Home Based [address] [facility]   [phone]      Date of Current Visit: 08/15/18       SUMMARY:   For full summary of patient's condition, please see Home Based Primary Care initial visit note on 7/2/18. This patient's chronic conditions including an intracerebral bleed (Aug 2014), interstitial lung disease with dependence on continuous oxygen and compression fracture of L4have resulted in the inability to leave the home to receive primary medical care without a significant taxing effort. Today we are visiting the patient for the following reason : well visit, Debility, decreased oral intake     This patient's goals of care are: To breath better at night    This patient's resuscitation status is:  [x] Attempt Resuscitation       [] Do Not Attempt Resuscitation    Advance Care Planning (ACP) Provider Note - Comprehensive     Date of ACP Conversation: 08/15/18  Persons included in Conversation:  patient and family  Length of ACP Conversation in minutes:  20 minutes    Authorized Decision Maker (if patient is incapable of making informed decisions): This person is:  Healthcare Agent/Medical Power of  under Advance Directive          General ACP for ALL Patients with Decision Making Capacity:   Importance of advance care planning, including choosing a healthcare agent to communicate patient's healthcare decisions if patient lost the ability to make decisions, such as after a sudden illness or accident  Understanding of the healthcare agent role was assessed and information provided    Review of Existing Advance Directive:  What information were you given about medical decisions to consider before completing your advance directive? lung disease    For Serious or Chronic Illness:  Understanding of medical condition    Understanding of CPR, goals and expected outcomes, benefits and burdens discussed.   Information on CPR success rates provided (e.g. for CPR in hospital, survival to d/c at two weeks is 22%, for chronically ill or elderly/frail survival is less than 3%); Individual asked to communicate understanding of information in his/her own words. Interventions Provided:  Recommended completion of Advance Directive form after review of ACP materials and conversation with prospective healthcare agent   Completed new Advance Directive      Advance Care Planning 9/26/2016   Patient's Healthcare Decision Maker is: Verbal statement (Legal Next of Kin remains as decision maker)   Primary Decision Maker Name [facility]   Primary Decision Maker Phone Number [phone]   Primary Decision Maker Relationship to Patient Adult child   Confirm Advance Directive None   Patient Would Like to Complete Advance Directive No        DIAGNOSES & PLAN:       ICD-10-CM ICD-9-CM    1. Advance care planning Z71.89 V65.49    2. Initial Medicare annual wellness visit Z00.00 V70.0    3. Screening for alcoholism Z13.89 V79.1 [place] ANNUAL ALCOHOL SCREEN 15 MIN   4. Screening for depression Z13.89 V79.0 DEPRESSION SCREEN ANNUAL   5. IPF (idiopathic pulmonary fibrosis) ([facility] [place] [facility] J84.112 516.31 REFERRAL TO HOME HEALTH   6. Debility R53.81 799.3 REFERRAL TO HOME HEALTH       Patient Instructions     Dear Popeye Dean ,    It was a pleasure seeing you at home today with Home Based Primary Care ([phone])    Your stated goal: To breath better at night    1. Well visit  - Today a well visit was completed  - No labs were required for todays visit  - You are doing well on todays visit     2. Weakness  - Your private caregiver was away last week  - Since she has been back, she has noticed you are more weak and able to do less than before  - This is likely due to being in bed more and less active last week  - Physical Therapy was completed.   - Since there has been a decline, we will see if they can resume therapy services  - A referral to [address] [place] [address] Ifeanyi Chávez for therapy will be sent  - You were too weak to stand on the scale to check your weight    3. Appetite  - Your caregiver and daughter report that your appetite has decreased since last week  - Your daughter shared you felt warm but did not have a way to check your temperature  - Advil is being used 1 tablet every 4 hours as needed for pain.  -We encourage you to stay hydrated as much as possible  - Small snacks as tolerated are also encouraged  - Please let us know if this does not improve this week  - We may also have the dietician come out in the near future    4. Health Maintenance  - Vaccine counseling provided today  - You are agreeable to update the vaccines  - We will arrange [facility] to come to the home to give the vaccines  - You have agreed to the out of pocket cost for the TDap vaccine      Health Maintenance Due   Topic Date Due    DTaP/Tdap/Td series (1 - Tdap) 04/30/1955    ZOSTER VACCINE AGE 60>  02/28/1994    GLAUCOMA SCREENING Q2Y  04/30/1999    Pneumococcal 65+ Low/Medium Risk (2 of 2 - PCV13) 05/11/2014    Influenza Age 9 to Adult  08/01/2018     5. IPF (Pulmonary Fibrosis)  - You wear continuous oxygen  - Your breathing is stable    6. Advance Care Planning     - Today, with the use of the [employer] , we completed Advance Directives  - We also discussed code status and you wish to be a [address] [facility] Planning 9/26/2016   Patient's Healthcare Decision Maker is: Verbal statement (Legal Next of Kin remains as decision maker)   Primary Decision Maker Name SA WOMACK   Primary Decision Maker Phone Number [phone]   Primary Decision Maker Relationship to Patient Adult child   Confirm Advance Directive None   Patient Would Like to Complete Advance Directive No     Someone from the Home Based Primary Care Team will see you again in 4 weeks    If there are any concerns before that time, such as medication questions, worsening symptoms or a need to see a physician for an urgent or emergent situation; please call [phone].  A physician is also on call after our normal business hours of 8am to 5pm.     In order to serve you better, please allow up to 48 hours for prescription refills to be processed. Certain medications may require more paperwork or a written prescription that you may need to  from the office. We appreciate you letting us know of any refill requests as soon as possible. Sincerely,    The Home Based Primary Care Team  MD Dariel Rosario NP Joanette Lo, RN     Medicare Wellness Visit, Female     The best way to live healthy is to have a lifestyle where you eat a well-balanced diet, exercise regularly, limit alcohol use, and quit all forms of tobacco/nicotine, if applicable. Regular preventive services are another way to keep healthy. Preventive services (vaccines, screening tests, monitoring & exams) can help personalize your care plan, which helps you manage your own care. Screening tests can find health problems at the earliest stages, when they are easiest to treat. [facility] follows the current, evidence-based guidelines published by the [facility] [place] (USPSTF) when recommending preventive services for our patients. Because we follow these guidelines, sometimes recommendations change over time as research supports it. (For example, mammograms used to be recommended annually. Even though Medicare will still pay for an annual mammogram, the newer guidelines recommend a mammogram every two years for women of average risk.)  Of course, you and your doctor may decide to screen more often for some diseases, based on your risk and your health status. Preventive services for you include:  - Medicare offers their members a free annual wellness visit, which is time for you and your primary care provider to discuss and plan for your preventive service needs. Take advantage of this benefit every year!  -All adults over the age of 72 should receive the recommended pneumonia vaccines.  Current USPSTF guidelines recommend a series of two vaccines for the best pneumonia protection.   -All adults should have a flu vaccine yearly and a tetanus vaccine every 10 years. All adults age 61 and older should receive a shingles vaccine once in their lifetime.    -A bone mass density test is recommended when a woman turns 65 to screen for osteoporosis. This test is only recommended one time, as a screening. Some providers will use this same test as a disease monitoring tool if you already have osteoporosis. -All adults age 38-68 who are overweight should have a diabetes screening test once every three years.   -Other screening tests and preventive services for persons with diabetes include: an eye exam to screen for diabetic retinopathy, a kidney function test, a foot exam, and stricter control over your cholesterol.   -Cardiovascular screening for adults with routine risk involves an electrocardiogram (ECG) at intervals determined by your doctor.   -Colorectal cancer screenings should be done for adults age 54-65 with no increased risk factors for colorectal cancer. There are a number of acceptable methods of screening for this type of cancer. Each test has its own benefits and drawbacks. Discuss with your doctor what is most appropriate for you during your annual wellness visit. The different tests include: colonoscopy (considered the best screening method), a fecal occult blood test, a fecal DNA test, and sigmoidoscopy. -Breast cancer screenings are recommended every other year for women of normal risk, age 54-69.  -Cervical cancer screenings for women over age 72 are only recommended with certain risk factors.   -All adults born between [facility] should be screened once for Hepatitis C.      Here is a list of your current Health Maintenance items (your personalized list of preventive services) with a due date:  Health Maintenance Due   Topic Date Due    DTaP/Tdap/Td  (1 - Tdap) 04/30/1955    Shingles Vaccine  02/28/1994    Glaucoma Screening   04/30/1999    Pneumococcal Vaccine (2 of 2 - PCV13) 05/11/2014    Flu Vaccine  08/01/2018              HISTORY:   HISTORY OBTAINED FROM: patient and patients daughter  Using [employer]  line    CHIEF COMPLAINT:   Chief Complaint   Patient presents with    Complete Physical       HPI/SUBJECTIVE:     Well visit today. Daughter reports 4-5 day hx of decreased appetite and periods of her mother not feeling well. Felt warm but unable to check temperature in the home. Private caregiver was on vacation last week. Since returning, the caregiver (present during visit) reports increased weakness and debility. It is attributed from lack of activity while she was away. Using Advil 200mg every 4hrs as needed for headache or pain. Recent Fall: [x] No / [] Yes (when):    Last bowel movement:  yesterday    REVIEW OF SYSTEMS:     The following systems were [x] reviewed / [] unable to be reviewed  Systems: constitutional, ears/nose/mouth/throat, respiratory, gastrointestinal, genitourinary, musculoskeletal, integumentary, neurologic, psychiatric, endocrine. Positive findings noted below: headache, diminished appetite x 4-5 days (but gradually improved), increased generalized weakness     VITAL SIGNS, PHYSICAL EXAM & FUNCTIONAL ASSESSMENT     Vital Signs: Wt Readings from Last 3 Encounters:   07/02/18 77 lb 6.4 oz (35.1 kg)   08/13/17 88 lb (39.9 kg)   03/21/17 88 lb (39.9 kg)     Temp Readings from Last 3 Encounters:   08/15/18 98.8 °F (37.1 °C) (Temporal)   08/14/18 99 °F (37.2 °C) (Temporal)   08/10/18 98.8 °F (37.1 °C) (Temporal)     BP Readings from Last 3 Encounters:   08/15/18 127/67   08/14/18 100/60   08/10/18 130/64     Pulse Readings from Last 3 Encounters:   08/15/18 94   08/14/18 89   08/10/18 91       Physical Exam:    Constitutional:  female, adv.  Age, NAD, sitting on edge of bed,  Eyes: pupils equal, anicteric  ENMT: no nasal discharge, moist mucous membranes  Cardiovascular: regular rhythm,no m/r/g,  distal pulses intact, no edema  Respiratory: breathing not labored, symmetric, wearing supplement O2 NC, distant  Gastrointestinal: soft non-tender, +bowel sounds  Musculoskeletal: no deformity, no tenderness to palpation  Skin: warm, dry, normal skin turgor, no open wounds/rashes/ulcers  Neurologic: following commands, moving all extremities  Psychiatric: normal affect, no hallucinations  Other:      Functional Assessment (description): 60-70&    Palliative Performance Scale:   Timed Up and Go (TUG):   Fall Risk Assessment, last 12 mths 8/15/2018   Able to walk? No        LAB DATA REVIEWED:     Lab Results   Component Value Date/Time    Hemoglobin A1c 5.0 09/01/2014 03:54 AM     No results found for: MCACR, MCA1, MCA2, MCA3, MCAU, MCAU2, MCALPOCT  Lab Results   Component Value Date/Time    TSH 2.460 07/02/2018 12:06 PM     Lab Results   Component Value Date/Time    VITAMIN D, 25-HYDROXY 21.9 (L) 07/02/2018 12:06 PM         Lab Results   Component Value Date/Time    WBC 9.2 07/02/2018 12:06 PM    HGB 11.6 07/02/2018 12:06 PM    PLATELET 058 84/95/2727 12:06 PM     Lab Results   Component Value Date/Time    Sodium 143 07/02/2018 12:06 PM    Potassium 4.2 07/02/2018 12:06 PM    Chloride 99 07/02/2018 12:06 PM    CO2 16 (L) 07/02/2018 12:06 PM    BUN 11 07/02/2018 12:06 PM    Creatinine 0.66 07/02/2018 12:06 PM    Calcium 8.7 07/02/2018 12:06 PM    Magnesium 1.8 07/18/2016 02:34 AM    Phosphorus 2.8 07/18/2016 02:34 AM      Lab Results   Component Value Date/Time    AST (SGOT) 53 (H) 07/02/2018 12:06 PM    Alk.  phosphatase 122 (H) 07/02/2018 12:06 PM    Protein, total 7.2 07/02/2018 12:06 PM    Albumin 3.7 07/02/2018 12:06 PM    Globulin 3.7 04/05/2017 12:05 PM     Lab Results   Component Value Date/Time    Iron 34 (L) 05/05/2014 02:50 AM    TIBC 251 05/05/2014 02:50 AM    Iron % saturation 14 (L) 05/05/2014 02:50 AM    Ferritin 90 05/05/2014 02:50 AM MEDICATIONS & PRESCRIPTIONS     Allergies   Allergen Reactions    Aspirin Nausea Only      Current Outpatient Prescriptions   Medication Sig    albuterol-ipratropium (DUO-NEB) 2.5 mg-0.5 mg/3 ml nebu INHALE 3 ML VIA NEBULIZER TWICE DAILY    OXYGEN-AIR DELIVERY SYSTEMS Take 2 L/min by inhalation continuous.  metoprolol tartrate (LOPRESSOR) 25 mg tablet Take 25 mg by mouth two (2) times a day.  gabapentin (NEURONTIN) 100 mg capsule Take 100 mg by mouth three (3) times daily.  amLODIPine (NORVASC) 5 mg tablet Take 1 Tab by mouth daily.  omeprazole (PRILOSEC) 20 mg capsule Take 20 mg by mouth daily.  ibuprofen (ADVIL) 200 mg tablet Take  by mouth.  acetaminophen (TYLENOL) 325 mg tablet Take 2 Tabs by mouth every four (4) hours as needed for Fever. No current facility-administered medications for this visit. No orders of the defined types were placed in this encounter. Total time: 45min + 20min ACP           Counseling / coordination time: 25min - coordination of care, contact with [employer] translation line, ACP conversation, counseling for ACP, vaccine/health maintenance counseling/education provided, coordination with home health services  > 50% counseling / coordination?:       This is an Initial Medicare Annual Wellness Exam (AWV) (Performed 12 months after IPPE or effective date of Medicare Part B enrollment, Once in a lifetime)    I have reviewed the patient's medical history in detail and updated the computerized patient record. Depression Risk Factor Screening:     PHQ over the last two weeks 8/15/2018   Little interest or pleasure in doing things Not at all   Feeling down, depressed, irritable, or hopeless Not at all   Total Score PHQ 2 0     Alcohol Risk Factor Screening: You do not drink alcohol or very rarely. Functional Ability and Level of Safety:     Hearing Loss  Hearing is good.     Activities of Daily Living  The home contains: handrails  Patient needs help with:  preparing meals, laundry, housework, managing medications, managing money, eating, dressing, bathing, hygiene, bathroom needs and walking    Fall Risk  Fall Risk Assessment, last 12 mths 8/15/2018   Able to walk? No       Abuse Screen  Patient is not abused    Cognitive Screening   Evaluation of Cognitive Function:  Has your family/caregiver stated any concerns about your memory: no  Normal    Patient Care Team   Patient Care Team:  Paco Garcia MD as PCP - General (Palliative Medicine)  Wolf Torres MD (Cardiology)  Twin Mobley MD as Physician (Urology)    Assessment/Plan   Education and counseling provided:  Are appropriate based on today's review and evaluation  Pneumococcal Vaccine  Influenza Vaccine    Diagnoses and all orders for this visit:    1. Advance care planning    2. Initial Medicare annual wellness visit    3. Screening for alcoholism  -     Annual  Alcohol Screen 15 min ()    4. Screening for depression  -     Depression Screen Annual    5. IPF (idiopathic pulmonary fibrosis) ([facility] [address]  -     [facility]    6.  [address] Maintenance Due   Topic Date Due    DTaP/Tdap/Td series (1 - Tdap) 04/30/1955    ZOSTER VACCINE AGE 60>  02/28/1994    GLAUCOMA SCREENING Q2Y  04/30/1999    Pneumococcal 65+ Low/Medium Risk (2 of 2 - PCV13) 05/11/2014    Influenza Age 9 to Adult  08/01/2018

## 2018-08-15 NOTE — PROGRESS NOTES
Home Based [facility] [facility] [address]  [phone]    RN Nursing Visit Note    Date of current visit: 08/15/18   Date of initial visit: 7/2/18    Diagnosis: Intracerebral bleed (Aug 2014), interstitial lung disease (on O2 at 2L), HTN, compression fx L4 vertebra Feb 2016 with back pain, GERD, renal calculi (chronic gramajo), recurrent UTI's    ACP:  Advance Care Planning 9/26/2016   Patient's Healthcare Decision Maker is: Verbal statement (Legal Next of Kin remains as decision maker)   Primary Decision Maker Name SA WOMACK   Primary Decision Maker Phone Number [phone]   Primary Decision Maker Relationship to Patient Adult child   Confirm Advance Directive None   Patient Would Like to Complete Advance Directive No       .      Primary Caregiver:  Debbie Atkins, Daughter - speaks some English    Wt Readings from Last 3 Encounters:   07/02/18 77 lb 6.4 oz (35.1 kg)   08/13/17 88 lb (39.9 kg)   03/21/17 88 lb (39.9 kg)           Visit Vitals    /67 (BP 1 Location: Left arm, BP Patient Position: Sitting)    Pulse 94    Temp 98.8 °F (37.1 °C) (Temporal)    Resp 18    SpO2 99%     Medication Management:  Current Outpatient Prescriptions   Medication Sig    albuterol-ipratropium (DUO-NEB) 2.5 mg-0.5 mg/3 ml nebu INHALE 3 ML VIA NEBULIZER TWICE DAILY    OXYGEN-AIR DELIVERY SYSTEMS Take 2 L/min by inhalation continuous.  metoprolol tartrate (LOPRESSOR) 25 mg tablet Take 25 mg by mouth two (2) times a day.  gabapentin (NEURONTIN) 100 mg capsule Take 100 mg by mouth three (3) times daily.  amLODIPine (NORVASC) 5 mg tablet Take 1 Tab by mouth daily.  omeprazole (PRILOSEC) 20 mg capsule Take 20 mg by mouth daily.  ibuprofen (ADVIL) 200 mg tablet Take  by mouth.  acetaminophen (TYLENOL) 325 mg tablet Take 2 Tabs by mouth every four (4) hours as needed for Fever. No current facility-administered medications for this visit. Plan:  1.   Referral sent to [facility] for Tdap, Pnuemonia, and influenza vaccines  2.   Referral to PT and OT  AMD  Completed     Follow Up Visit:  4 week visit

## 2018-08-15 NOTE — PATIENT INSTRUCTIONS
Dear Uriel Chaves ,    It was a pleasure seeing you at home today with [facility] ([phone])    Your stated goal: To breath better at night    1. Well visit  - Today a well visit was completed  - No labs were required for todays visit  - You are doing well on todays visit     2. Weakness  - Your private caregiver was away last week  - Since she has been back, she has noticed you are more weak and able to do less than before  - This is likely due to being in bed more and less active last week  - Physical Therapy was completed. - Since there has been a decline, we will see if they can resume therapy services  - A referral to [address] [place] [address] Ifeanyi Chávez for therapy will be sent  - You were too weak to stand on the scale to check your weight    3. Appetite  - Your caregiver and daughter report that your appetite has decreased since last week  - Your daughter shared you felt warm but did not have a way to check your temperature  - Advil is being used 1 tablet every 4 hours as needed for pain.  -We encourage you to stay hydrated as much as possible  - Small snacks as tolerated are also encouraged  - Please let us know if this does not improve this week  - We may also have the dietician come out in the near future    4. Health Maintenance  - Vaccine counseling provided today  - You are agreeable to update the vaccines  - We will arrange [facility] to come to the home to give the vaccines  - You have agreed to the out of pocket cost for the TDap vaccine      Health Maintenance Due   Topic Date Due    DTaP/Tdap/Td series (1 - Tdap) 04/30/1955    ZOSTER VACCINE AGE 60>  02/28/1994    GLAUCOMA SCREENING Q2Y  04/30/1999    Pneumococcal 65+ Low/Medium Risk (2 of 2 - PCV13) 05/11/2014    Influenza Age 9 to Adult  08/01/2018     5. IPF (Pulmonary Fibrosis)  - You wear continuous oxygen  - Your breathing is stable    6.  Advance Care Planning     - Today, with the use of the [facility] , we completed Advance Directives  - We also discussed code status and you wish to be a [facility] Planning 9/26/2016   Patient's Healthcare Decision Maker is: Verbal statement (Legal Next of Kin remains as decision maker)   Primary Decision Maker Name SA WOMACK   Primary Decision Maker Phone Number [phone]   Primary Decision Maker Relationship to Patient Adult child   Confirm Advance Directive None   Patient Would Like to Complete Advance Directive No     Someone from the [facility] Team will see you again in 4 weeks    If there are any concerns before that time, such as medication questions, worsening symptoms or a need to see a physician for an urgent or emergent situation; please call [phone]. A physician is also on call after our normal business hours of 8am to 5pm.     In order to serve you better, please allow up to 48 hours for prescription refills to be processed. Certain medications may require more paperwork or a written prescription that you may need to  from the office. We appreciate you letting us know of any refill requests as soon as possible. Sincerely,    The [facility] Team  MD Jose De Jesus Jeong NP Birder Rouse, RN     Medicare Wellness Visit, Female     The best way to live healthy is to have a lifestyle where you eat a well-balanced diet, exercise regularly, limit alcohol use, and quit all forms of tobacco/nicotine, if applicable. Regular preventive services are another way to keep healthy. Preventive services (vaccines, screening tests, monitoring & exams) can help personalize your care plan, which helps you manage your own care. Screening tests can find health problems at the earliest stages, when they are easiest to treat. [facility] follows the current, evidence-based guidelines published by the [place] (USPSTF) when recommending preventive services for our patients.  Because we follow these guidelines, sometimes recommendations change over time as research supports it. (For example, mammograms used to be recommended annually. Even though Medicare will still pay for an annual mammogram, the newer guidelines recommend a mammogram every two years for women of average risk.)  Of course, you and your doctor may decide to screen more often for some diseases, based on your risk and your health status. Preventive services for you include:  - Medicare offers their members a free annual wellness visit, which is time for you and your primary care provider to discuss and plan for your preventive service needs. Take advantage of this benefit every year!  -All adults over the age of 72 should receive the recommended pneumonia vaccines. Current USPSTF guidelines recommend a series of two vaccines for the best pneumonia protection.   -All adults should have a flu vaccine yearly and a tetanus vaccine every 10 years. All adults age 61 and older should receive a shingles vaccine once in their lifetime.    -A bone mass density test is recommended when a woman turns 65 to screen for osteoporosis. This test is only recommended one time, as a screening. Some providers will use this same test as a disease monitoring tool if you already have osteoporosis. -All adults age 38-68 who are overweight should have a diabetes screening test once every three years.   -Other screening tests and preventive services for persons with diabetes include: an eye exam to screen for diabetic retinopathy, a kidney function test, a foot exam, and stricter control over your cholesterol.   -Cardiovascular screening for adults with routine risk involves an electrocardiogram (ECG) at intervals determined by your doctor.   -Colorectal cancer screenings should be done for adults age 54-65 with no increased risk factors for colorectal cancer. There are a number of acceptable methods of screening for this type of cancer.  Each test has its own benefits and drawbacks. Discuss with your doctor what is most appropriate for you during your annual wellness visit. The different tests include: colonoscopy (considered the best screening method), a fecal occult blood test, a fecal DNA test, and sigmoidoscopy. -Breast cancer screenings are recommended every other year for women of normal risk, age 54-69.  -Cervical cancer screenings for women over age 72 are only recommended with certain risk factors.   -All adults born between [facility] should be screened once for Hepatitis C.      Here is a list of your current Health Maintenance items (your personalized list of preventive services) with a due date:  Health Maintenance Due   Topic Date Due    DTaP/Tdap/Td  (1 - Tdap) 04/30/1955    Shingles Vaccine  02/28/1994    Glaucoma Screening   04/30/1999    Pneumococcal Vaccine (2 of 2 - PCV13) 05/11/2014    Flu Vaccine  08/01/2018

## 2018-08-15 NOTE — ACP (ADVANCE CARE PLANNING)
Advance Care Planning (ACP) Provider Note - Comprehensive     Date of ACP Conversation: 08/15/18  Persons included in Conversation:  patient and family  Length of ACP Conversation in minutes:  20 minutes    Authorized Decision Maker (if patient is incapable of making informed decisions): This person is:  Healthcare Agent/Medical Power of  under Advance Directive          General ACP for ALL Patients with Decision Making Capacity:   Importance of advance care planning, including choosing a healthcare agent to communicate patient's healthcare decisions if patient lost the ability to make decisions, such as after a sudden illness or accident  Understanding of the healthcare agent role was assessed and information provided    Review of Existing Advance Directive:  What information were you given about medical decisions to consider before completing your advance directive? lung disease    For Serious or Chronic Illness:  Understanding of medical condition    Understanding of CPR, goals and expected outcomes, benefits and burdens discussed. Information on CPR success rates provided (e.g. for CPR in hospital, survival to d/c at two weeks is 22%, for chronically ill or elderly/frail survival is less than 3%); Individual asked to communicate understanding of information in his/her own words.     Interventions Provided:  Recommended completion of Advance Directive form after review of ACP materials and conversation with prospective healthcare agent   Completed new Advance Directive      Advance Care Planning 9/26/2016   Patient's Healthcare Decision Maker is: Verbal statement (Legal Next of Kin remains as decision maker)   Primary Decision Maker Name [address] [place] [address]   Primary Decision Maker Phone Number [phone]   Primary Decision Maker Relationship to Patient Adult child   Confirm Advance Directive None   Patient Would Like to Complete Advance Directive No

## 2018-08-21 NOTE — TELEPHONE ENCOUNTER
Caller stated she called [facility] for pt because she had a low grade fever and complaining of pain

## 2018-08-21 NOTE — TELEPHONE ENCOUNTER
Received call from [facility]. Chambers cath changed. UA shows only leukocytes. Due to low grade temp and decreased mobitliy/energy. Pt will be started on abx therapy.

## 2018-08-21 NOTE — TELEPHONE ENCOUNTER
[facility] informs she wentto patients home to open her to PT today , patient was running a low grade temp  99.6, caregiver and daughter reports she was feeling warm for a few days , could not do any exercised, could not sit on side of bed , concerns because of gramajo and wants to ensure there was not a UTI , PT called [facility] to patients home , information noted and forwarded to [facility] Team

## 2018-08-28 NOTE — TELEPHONE ENCOUNTER
Returned call to Adilson Alarcon  for [facility] [employer] [facility] voice message left to inform that if they are unwilling to accept NP signature our office could not provide a signature from Dr Lurlene Schirmer any time soon as it was not our patient in 2017 and we where working to help out [facility] [facility] to collect delayed billing

## 2018-08-29 NOTE — TELEPHONE ENCOUNTER
Caller stated pt is out of amlodopine and the original prescription was written by another physician

## 2018-09-11 NOTE — PROGRESS NOTES
[facility] [place] [address] [address] 
[phone] RN Nursing Visit Note Date of current visit: 09/11/18 Date of initial visit: 8/15/18 Diagnosis: Intracerebral bleed (Aug 2014), interstitial lung disease (on O2 at 2L), HTN, compression fx L4 vertebra Feb 2016 with back pain, GERD, renal calculi (chronic gramajo), recurrent UTI's 
 
ACP: 
Advance Care Planning 9/26/2016 Patient's Healthcare Decision Maker is: Verbal statement (Legal Next of Kin remains as decision maker) Primary Decision Maker Name SA WOMACK Primary Decision Maker Phone Number [phone] Primary Decision Maker Relationship to Patient Adult child Confirm Advance Directive None Patient Would Like to Complete Advance Directive No  
 
 
. Primary Caregiver: 
Yosef Foote, Daughter - speaks some [place] Wt Readings from Last 3 Encounters:  
07/02/18 77 lb 6.4 oz (35.1 kg) 08/13/17 88 lb (39.9 kg) 03/21/17 88 lb (39.9 kg) Visit Vitals  /54 (BP 1 Location: Left arm, BP Patient Position: Supine)  Pulse 90  Temp 97.9 °F (36.6 °C) (Temporal)  Resp 16  SpO2 99% Medication Management: 
Current Outpatient Prescriptions Medication Sig  
 gabapentin (NEURONTIN) 100 mg capsule Take 1 Cap by mouth three (3) times daily for 90 days.  omeprazole (PRILOSEC) 20 mg capsule Take 1 Cap by mouth daily.  amLODIPine (NORVASC) 5 mg tablet TAKE 1 TABLET BY MOUTH EVERY DAY  albuterol-ipratropium (DUO-NEB) 2.5 mg-0.5 mg/3 ml nebu INHALE 3 ML VIA NEBULIZER TWICE DAILY  OXYGEN-AIR DELIVERY SYSTEMS Take 2 L/min by inhalation continuous.  metoprolol tartrate (LOPRESSOR) 25 mg tablet Take 25 mg by mouth two (2) times a day.  ibuprofen (MOTRIN) 200 mg tablet Take 200 mg by mouth every six (6) hours as needed for Pain.  acetaminophen (TYLENOL) 325 mg tablet Take 2 Tabs by mouth every four (4) hours as needed for Fever. No current facility-administered medications for this visit. Plan: 1. Refilled gabapentin Follow Up Visit: 
6 week visit

## 2018-09-11 NOTE — PATIENT INSTRUCTIONS
Dear Franny Ocampo , It was a pleasure seeing you at home today with [facility] ([phone]) Your stated goal: To breath better at night 1. Breathing / Lung Fibrosis - You now have new oxygen equipment - At this time, you do not have a nebulizer machine and would have to pay out of pocket - Your breathing is stable at this time 2. Urine Catheter - You were recently seen and treated on 8/22/18 by Ankit Whittington for a UTI - Keflex was prescribed and completed - There are no further signs of infection 
- Home health assists with the urine catheter changes 3. Weakness - Your private caregiver is back and working with you regularly - She helps work on your exercises - Physical Therapy have been completed. 4. Health Maintenance - The flu and pneumonia vaccines were completed Health Maintenance Due Topic Date Due  
 ZOSTER VACCINE AGE 60>  02/28/1994  GLAUCOMA SCREENING Q2Y  04/30/1999  
 
5. Advance Care Planning - Advance directives are on file - You are a FULL CODE Advance Care Planning 9/26/2016 Patient's Healthcare Decision Maker is: Verbal statement (Legal Next of Kin remains as decision maker) Primary Decision Maker Name SA WOMACK Primary Decision Maker Phone Number [phone] Primary Decision Maker Relationship to Patient Adult child Confirm Advance Directive None Patient Would Like to Complete Advance Directive No  
 
Someone from the [facility] Team will see you again in 6 weeks If there are any concerns before that time, such as medication questions, worsening symptoms or a need to see a physician for an urgent or emergent situation; please call [phone]. A physician is also on call after our normal business hours of 8am to 5pm.  
 
In order to serve you better, please allow up to 48 hours for prescription refills to be processed.  Certain medications may require more paperwork or a written prescription that you may need to  from the office. We appreciate you letting us know of any refill requests as soon as possible. Sincerely, The [facility] 
 
MD Fox Vega NP Jolayne Ruse, NP Devera Socks, RN Rana Quarto, JOANNA Weakness: Care Instructions Your Care Instructions Weakness is a lack of physical or muscle strength. You may feel that you need to make extra effort to move your arms, legs, or other muscles. Generalized weakness means that you feel weak in most areas of your body. Another type of weakness may affect just one muscle or group of muscles. You may feel weak and tired after you have done too much activity, such as taking an extra-long hike. This is not a serious problem. It often goes away on its own. Feeling weak can also be caused by medical conditions like thyroid problems, depression, or a virus. Sometimes the cause can be serious. Your doctor may want to do more tests to try to find the cause of the weakness. The doctor has checked you carefully, but problems can develop later. If you notice any problems or new symptoms, get medical treatment right away. Follow-up care is a key part of your treatment and safety. Be sure to make and go to all appointments, and call your doctor if you are having problems. It's also a good idea to know your test results and keep a list of the medicines you take. How can you care for yourself at home? · Rest when you feel tired. · Be safe with medicines. If your doctor prescribed medicine, take it exactly as prescribed. Call your doctor if you think you are having a problem with your medicine. You will get more details on the specific medicines your doctor prescribes. · Do not skip meals. Eating a balanced diet may increase your energy level. · Get some physical activity every day, but do not get too tired. When should you call for help? Call your doctor now or seek immediate medical care if: 
  · You have new or worse weakness.  
  · You are dizzy or lightheaded, or you feel like you may faint.  
 Watch closely for changes in your health, and be sure to contact your doctor if: 
  · You do not get better as expected. Where can you learn more? Go to http://jose-cheyanne.info/. Enter 457 [id number] in the search box to learn more about \"Weakness: Care Instructions. \" Current as of: November 20, 2017 Content Version: 11.7 © 8128-5109 [employer]. Care instructions adapted under license by [employer] (which disclaims liability or warranty for this information). If you have questions about a medical condition or this instruction, always ask your healthcare professional. [address] any warranty or liability for your use of this information.

## 2018-09-11 NOTE — MR AVS SNAPSHOT
[address] [facility], [address] [address] 
[phone] Patient: Kelly Red 
MRN: [id number] MONTE:5[birth year]83[birth year]2887 Visit Information Date & Time Provider Department Dept. Phone Encounter #  
 9/11/2018 12:30 PM Mohit Juarez NP [address] [facility] and [facility] [phone] [id number] Follow-up Instructions Return in about 6 weeks (around 10/23/2018), or if symptoms worsen or fail to improve, for Regular Follow-up. Follow-up and Disposition History Upcoming Health Maintenance Date Due ZOSTER VACCINE AGE 60> 2/28/1994 GLAUCOMA SCREENING Q2Y 4/30/1999 MEDICARE YEARLY EXAM 8/16/2019 Pneumococcal 65+ Low/Medium Risk (2 of 2 - PCV13) 8/30/2019 DTaP/Tdap/Td series (2 - Td) 8/31/2028 Allergies as of 9/11/2018  Review Complete On: 9/11/2018 By: Mohit Juarez NP Severity Noted Reaction Type Reactions Aspirin  05/10/2013   Side Effect Nausea Only Current Immunizations  Reviewed on 9/11/2018 Name Date Influenza High Dose Vaccine PF 8/30/2018 Pneumococcal Polysaccharide (PPSV-23) 8/30/2018, 5/11/2013  2:03 PM  
 Tdap 8/31/2018 Reviewed by Misha Rebolledo RN on 9/11/2018 at  3:18 PM  
You Were Diagnosed With   
  
 Codes Comments [facility]idiopathic pulmonary fibrosis) ([facility]    -  Primary ICD-10-CM: W91.458 ICD-9-CM: 516.31 Chronic indwelling Chambers catheter     ICD-10-CM: Z92.89 ICD-9-CM: V45.89 Dependence on continuous supplemental oxygen     ICD-10-CM: Z99.81 ICD-9-CM: V46.2 Weakness generalized     ICD-10-CM: R53.1 ICD-9-CM: 780.79 Vitals BP Pulse Temp Resp SpO2 OB Status 169/54 (BP 1 Location: Left arm, BP Patient Position: Supine) 90 97.9 °F (36.6 °C) (Temporal) 16 99% Postmenopausal  
 Smoking Status Never Smoker Preferred Pharmacy Pharmacy Name Phone [address] [phone] Your Updated Medication List  
  
   
This list is accurate as of 9/11/18 11:59 PM.  Always use your most recent med list.  
  
  
  
  
 acetaminophen 325 mg tablet Commonly known as:  TYLENOL Take 2 Tabs by mouth every four (4) hours as needed for Fever. albuterol-ipratropium 2.5 mg-0.5 mg/3 ml Nebu Commonly known as:  Bethany Branchbbe INHALE 3 ML VIA NEBULIZER TWICE DAILY  
  
 amLODIPine 5 mg tablet Commonly known as:  Voncile Woodland TAKE 1 TABLET BY MOUTH EVERY DAY  
  
 gabapentin 100 mg capsule Commonly known as:  NEURONTIN Take 1 Cap by mouth three (3) times daily for 90 days. ibuprofen 200 mg tablet Commonly known as:  MOTRIN Take 200 mg by mouth every six (6) hours as needed for Pain.  
  
 metoprolol tartrate 25 mg tablet Commonly known as:  LOPRESSOR Take 25 mg by mouth two (2) times a day. omeprazole 20 mg capsule Commonly known as:  PRILOSEC Take 1 Cap by mouth daily. OXYGEN-AIR DELIVERY SYSTEMS Take 2 L/min by inhalation continuous. Prescriptions Sent to Pharmacy Refills  
 gabapentin (NEURONTIN) 100 mg capsule 2 Sig: Take 1 Cap by mouth three (3) times daily for 90 days. Class: Normal  
 Pharmacy: [facility] [address] [facility] #: [phone] Route: Oral  
 omeprazole (PRILOSEC) 20 mg capsule 3 Sig: Take 1 Cap by mouth daily. Class: Normal  
 [facility] [address]  #: [phone] Route: Oral  
  
Follow-up Instructions Return in about 6 weeks (around 10/23/2018), or if symptoms worsen or fail to improve, for Regular Follow-up. To-Do List   
 09/19/2018 To Be Determined   Appointment with Nancy De La Cruz at [facility] SCHEDULING  
  
 09/26/2018 To Be Determined Appointment with Xiomara Johnston at [facility]  
  
 10/03/2018 To Be Determined Appointment with Xiomara Johnston at [place] [address]  
  
 10/10/2018 To Be Determined Appointment with Xiomara Johnston at [place] [address] Patient Instructions Dear Tenisha Gillespie , It was a pleasure seeing you at home today with Home Based [facility] ([phone]) Your stated goal: To breath better at night 1. Breathing / Lung Fibrosis - You now have new oxygen equipment - At this time, you do not have a nebulizer machine and would have to pay out of pocket - Your breathing is stable at this time 2. Urine Catheter - You were recently seen and treated on 8/22/18 by Ankit Whittington for a UTI - Keflex was prescribed and completed - There are no further signs of infection 
- Home health assists with the urine catheter changes 3. Weakness - Your private caregiver is back and working with you regularly - She helps work on your exercises - Physical Therapy have been completed. 4. Health Maintenance - The flu and pneumonia vaccines were completed Health Maintenance Due Topic Date Due  
 ZOSTER VACCINE AGE 60>  02/28/1994  GLAUCOMA SCREENING Q2Y  04/30/1999  
 
5. Advance Care Planning - Advance directives are on file - You are a FULL CODE Advance Care Planning 9/26/2016 Patient's Healthcare Decision Maker is: Verbal statement (Legal Next of Kin remains as decision maker) Primary Decision Maker Name  VU Primary Decision Maker Phone Number [phone] Primary Decision Maker Relationship to Patient Adult child Confirm Advance Directive None Patient Would Like to Complete Advance Directive No  
 
Someone from the Home Based Primary Care Team will see you again in 6 weeks If there are any concerns before that time, such as medication questions, worsening symptoms or a need to see a physician for an urgent or emergent situation; please call [phone]. A physician is also on call after our normal business hours of 8am to 5pm.  
 
In order to serve you better, please allow up to 48 hours for prescription refills to be processed. Certain medications may require more paperwork or a written prescription that you may need to  from the office. We appreciate you letting us know of any refill requests as soon as possible. Sincerely, The Home Based Primary Care Team 
 
MD Helena Cordero, EVANGELIST Ramírez, JOANNA Guaman RN Weakness: Care Instructions Your Care Instructions Weakness is a lack of physical or muscle strength. You may feel that you need to make extra effort to move your arms, legs, or other muscles. Generalized weakness means that you feel weak in most areas of your body. Another type of weakness may affect just one muscle or group of muscles. You may feel weak and tired after you have done too much activity, such as taking an extra-long hike. This is not a serious problem. It often goes away on its own. Feeling weak can also be caused by medical conditions like thyroid problems, depression, or a virus. Sometimes the cause can be serious. Your doctor may want to do more tests to try to find the cause of the weakness. The doctor has checked you carefully, but problems can develop later. If you notice any problems or new symptoms, get medical treatment right away. Follow-up care is a key part of your treatment and safety. Be sure to make and go to all appointments, and call your doctor if you are having problems. It's also a good idea to know your test results and keep a list of the medicines you take. How can you care for yourself at home? · Rest when you feel tired. · Be safe with medicines. If your doctor prescribed medicine, take it exactly as prescribed. Call your doctor if you think you are having a problem with your medicine. You will get more details on the specific medicines your doctor prescribes. · Do not skip meals. Eating a balanced diet may increase your energy level. · Get some physical activity every day, but do not get too tired. When should you call for help? Call your doctor now or seek immediate medical care if: 
  · You have new or worse weakness.  
  · You are dizzy or lightheaded, or you feel like you may faint.  
 Watch closely for changes in your health, and be sure to contact your doctor if: 
  · You do not get better as expected. Where can you learn more? Go to http://jose-cheyanne.info/. Enter [phone] in the search box to learn more about \"Weakness: Care Instructions. \" Current as of: November 20, 2017 Content Version: 11.7 © 4672-3140 [employer]. Care instructions adapted under license by [employer] (which disclaims liability or warranty for this information). If you have questions about a medical condition or this instruction, always ask your healthcare professional. [address] any warranty or liability for your use of this information. Patient Instructions History Introducing [facility]! [facility] introduces AnSyn patient portal. Now you can access parts of your medical record, email your doctor's office, and request medication refills online. 1. In your internet browser, go to [email or web address] 2. Click on the First Time User? Click Here link in the Sign In box. You will see the New Member Sign Up page. 3. Enter your AnSyn Access Code exactly as it appears below. You will not need to use this code after youve completed the sign-up process. If you do not sign up before the expiration date, you must request a new code. · SmartOn Learning Access Code: [email or web address] Expires: 9/16/2018  7:48 AM 
 
4. Enter the last four digits of your Social Security Number (xxxx) and Date of Birth (mm/dd/yyyy) as indicated and click Submit. You will be taken to the next sign-up page. 5. Create a SmartOn Learning ID. This will be your SmartOn Learning login ID and cannot be changed, so think of one that is secure and easy to remember. 6. Create a SmartOn Learning password. You can change your password at any time. 7. Enter your Password Reset Question and Answer. This can be used at a later time if you forget your password. 8. Enter your e-mail address. You will receive e-mail notification when new information is available in [address] 9. Click Sign Up. You can now view and download portions of your medical record. 10. Click the Download Summary menu link to download a portable copy of your medical information. If you have questions, please visit the Frequently Asked Questions section of the SmartOn Learning website. Remember, SmartOn Learning is NOT to be used for urgent needs. For medical emergencies, dial 911. Now available from your iPhone and Android! Please provide this summary of care documentation to your next provider. Your primary care clinician is listed as Dao Bland. If you have any questions after today's visit, please call [phone].

## 2018-09-12 NOTE — PROGRESS NOTES
Home Based [address] [facility]  
[phone] Date of Current Visit: 09/11/18 SUMMARY:  
For full summary of patient's condition, please see Home Based Primary Care initial visit note on 7/2/18. This patient's chronic conditions including an intracerebral bleed (Aug 2014), interstitial lung disease with dependence on continuous oxygen and compression fracture of L4have resulted in the inability to leave the home to receive primary medical care without a significant taxing effort. Today we are visiting the patient for the following reason : recent UTI, weakness, pulmonary fibrosis This patient's goals of care are: To breath better at night This patient's resuscitation status is: 
[x] Attempt Resuscitation       [] Do Not Attempt Resuscitation DIAGNOSES & PLAN:  
 
  ICD-10-CM ICD-9-CM 1. IPF (idiopathic pulmonary fibrosis) ([facility] J84.112 516.31   
2. Chronic indwelling Chambers catheter Z92.89 V45.89   
3. Dependence on continuous supplemental oxygen Z99.81 V46.2 4. Weakness generalized R53.1 780.79 Patient Instructions Dear Dolly Rivas , It was a pleasure seeing you at home today with Home Based [address] [facility] ([phone]) Your stated goal: To breath better at night 1. Breathing / Lung Fibrosis - You now have new oxygen equipment - At this time, you do not have a nebulizer machine and would have to pay out of pocket - Your breathing is stable at this time 2. Urine Catheter - You were recently seen and treated on 8/22/18 by Ankit Whittington for a UTI - Keflex was prescribed and completed - There are no further signs of infection 
- Home health assists with the urine catheter changes 3. Weakness - Your private caregiver is back and working with you regularly - She helps work on your exercises - Physical Therapy have been completed. 4. Health Maintenance - The flu and pneumonia vaccines were completed Health Maintenance Due  
 Topic Date Due  
 ZOSTER VACCINE AGE 60>  02/28/1994  GLAUCOMA SCREENING Q2Y  04/30/1999  
 
5. Advance Care Planning - Advance directives are on file - You are a FULL CODE Advance Care Planning 9/26/2016 Patient's Healthcare Decision Maker is: Verbal statement (Legal Next of Kin remains as decision maker) Primary Decision Maker Name SA WOMACK Primary Decision Maker Phone Number [phone] Primary Decision Maker Relationship to Patient Adult child Confirm Advance Directive None Patient Would Like to Complete Advance Directive No  
 
Someone from the Home Based Primary Care Team will see you again in 6 weeks If there are any concerns before that time, such as medication questions, worsening symptoms or a need to see a physician for an urgent or emergent situation; please call [phone]. A physician is also on call after our normal business hours of 8am to 5pm.  
 
In order to serve you better, please allow up to 48 hours for prescription refills to be processed. Certain medications may require more paperwork or a written prescription that you may need to  from the office. We appreciate you letting us know of any refill requests as soon as possible. Sincerely, The Home Based Primary Care Team 
 
MD Rebecca Houston, NP Hinda Gleason, NP Connor Bloch, JOANNA Lopez, JOANNA Weakness: Care Instructions Your Care Instructions Weakness is a lack of physical or muscle strength. You may feel that you need to make extra effort to move your arms, legs, or other muscles. Generalized weakness means that you feel weak in most areas of your body. Another type of weakness may affect just one muscle or group of muscles. You may feel weak and tired after you have done too much activity, such as taking an extra-long hike. This is not a serious problem. It often goes away on its own.  
Feeling weak can also be caused by medical conditions like thyroid problems, depression, or a virus. Sometimes the cause can be serious. Your doctor may want to do more tests to try to find the cause of the weakness. The doctor has checked you carefully, but problems can develop later. If you notice any problems or new symptoms, get medical treatment right away. Follow-up care is a key part of your treatment and safety. Be sure to make and go to all appointments, and call your doctor if you are having problems. It's also a good idea to know your test results and keep a list of the medicines you take. How can you care for yourself at home? · Rest when you feel tired. · Be safe with medicines. If your doctor prescribed medicine, take it exactly as prescribed. Call your doctor if you think you are having a problem with your medicine. You will get more details on the specific medicines your doctor prescribes. · Do not skip meals. Eating a balanced diet may increase your energy level. · Get some physical activity every day, but do not get too tired. When should you call for help? Call your doctor now or seek immediate medical care if: 
  · You have new or worse weakness.  
  · You are dizzy or lightheaded, or you feel like you may faint.  
 Watch closely for changes in your health, and be sure to contact your doctor if: 
  · You do not get better as expected. Where can you learn more? Go to http://jose-cheyanne.info/. Enter [phone] in the search box to learn more about \"Weakness: Care Instructions. \" Current as of: November 20, 2017 Content Version: 11.7 © 8968-5722 [employer]. Care instructions adapted under license by [employer] (which disclaims liability or warranty for this information). If you have questions about a medical condition or this instruction, always ask your healthcare professional. [address] any warranty or liability for your use of this information.  
 
 
 
 HISTORY:  
 HISTORY OBTAINED FROM: patient and patients daughter  Using [place]  line CHIEF COMPLAINT:  
Chief Complaint Patient presents with  Pulmonary Fibrosis  O2/Oxygen  Medication Refill HPI/SUBJECTIVE:  
[facility]x. Treated with Keflex. Indwelling gramajo managed by home health services. Weakness improved with therapy and having regular caregiver back. New oxygen company set up and supplies delivered. Daughter requesting refill on medications Recent Fall: [x] No / [] Yes (when):   
Last bowel movement:  today REVIEW OF SYSTEMS:  
 
The following systems were [x] reviewed / [] unable to be reviewed Systems: constitutional, ears/nose/mouth/throat, respiratory, gastrointestinal, genitourinary, musculoskeletal, integumentary, neurologic, psychiatric, endocrine. Positive findings noted below: improving generalized weakness VITAL SIGNS, PHYSICAL EXAM & FUNCTIONAL ASSESSMENT Vital Signs: Wt Readings from Last 3 Encounters:  
07/02/18 77 lb 6.4 oz (35.1 kg) 08/13/17 88 lb (39.9 kg) 03/21/17 88 lb (39.9 kg) Temp Readings from Last 3 Encounters:  
09/11/18 97.9 °F (36.6 °C) (Temporal) 09/05/18 97.4 °F (36.3 °C) (Temporal) 09/04/18 97.7 °F (36.5 °C) (Temporal) BP Readings from Last 3 Encounters:  
09/11/18 169/54  
09/05/18 110/60  
09/04/18 128/66 Pulse Readings from Last 3 Encounters:  
09/11/18 90  
09/05/18 71  
09/03/18 82 Physical Exam: 
 
Constitutional:  female, adv. Age, NAD, sitting on edge of bed, Eyes: pupils equal, anicteric ENMT: no nasal discharge, moist mucous membranes Cardiovascular: regular rhythm,no m/r/g,  distal pulses intact, no edema Respiratory: breathing not labored, symmetric, wearing supplement O2 NC, distant Gastrointestinal: soft non-tender, +bowel sounds Musculoskeletal: no deformity, no tenderness to palpation Skin: warm, dry, normal skin turgor, no open wounds/rashes/ulcers Neurologic: following commands, moving all extremities Psychiatric: normal affect, no hallucinations Other: 
 
 
Functional Assessment (description): 60-70& 
 
Palliative Performance Scale:  
Timed Up and Go (TUG): Fall Risk Assessment, last 12 mths 8/15/2018 Able to walk? No  
 
 
 LAB DATA REVIEWED:  
 
Lab Results Component Value Date/Time Hemoglobin A1c 5.0 09/01/2014 03:54 AM  
 
No results found for: Octavia Mccullough, MCA2, MCA3, MCAU, MCAU2, MCALPOCT Lab Results Component Value Date/Time TSH 2.460 07/02/2018 12:06 PM  
 
Lab Results Component Value Date/Time VITAMIN D, 25-HYDROXY 21.9 (L) 07/02/2018 12:06 PM  
   
 
Lab Results Component Value Date/Time WBC 9.2 07/02/2018 12:06 PM  
 HGB 11.6 07/02/2018 12:06 PM  
 PLATELET 593 82/52/5600 12:06 PM  
 
Lab Results Component Value Date/Time Sodium 143 07/02/2018 12:06 PM  
 Potassium 4.2 07/02/2018 12:06 PM  
 Chloride 99 07/02/2018 12:06 PM  
 CO2 16 (L) 07/02/2018 12:06 PM  
 BUN 11 07/02/2018 12:06 PM  
 Creatinine 0.66 07/02/2018 12:06 PM  
 Calcium 8.7 07/02/2018 12:06 PM  
 Magnesium 1.8 07/18/2016 02:34 AM  
 Phosphorus 2.8 07/18/2016 02:34 AM  
  
Lab Results Component Value Date/Time AST (SGOT) 53 (H) 07/02/2018 12:06 PM  
 Alk. phosphatase 122 (H) 07/02/2018 12:06 PM  
 Protein, total 7.2 07/02/2018 12:06 PM  
 Albumin 3.7 07/02/2018 12:06 PM  
 Globulin 3.7 04/05/2017 12:05 PM  
 
Lab Results Component Value Date/Time Iron 34 (L) 05/05/2014 02:50 AM  
 TIBC 251 05/05/2014 02:50 AM  
 Iron % saturation 14 (L) 05/05/2014 02:50 AM  
 Ferritin 90 05/05/2014 02:50 AM  
  
 
 MEDICATIONS & PRESCRIPTIONS Allergies Allergen Reactions  Aspirin Nausea Only Current Outpatient Prescriptions Medication Sig  
 gabapentin (NEURONTIN) 100 mg capsule Take 1 Cap by mouth three (3) times daily for 90 days.  omeprazole (PRILOSEC) 20 mg capsule Take 1 Cap by mouth daily.  amLODIPine (NORVASC) 5 mg tablet TAKE 1 TABLET BY MOUTH EVERY DAY  albuterol-ipratropium (DUO-NEB) 2.5 mg-0.5 mg/3 ml nebu INHALE 3 ML VIA NEBULIZER TWICE DAILY  OXYGEN-AIR DELIVERY SYSTEMS Take 2 L/min by inhalation continuous.  metoprolol tartrate (LOPRESSOR) 25 mg tablet Take 25 mg by mouth two (2) times a day.  ibuprofen (MOTRIN) 200 mg tablet Take 200 mg by mouth every six (6) hours as needed for Pain.  acetaminophen (TYLENOL) 325 mg tablet Take 2 Tabs by mouth every four (4) hours as needed for Fever. No current facility-administered medications for this visit. Medications Ordered Today Medications  gabapentin (NEURONTIN) 100 mg capsule Sig: Take 1 Cap by mouth three (3) times daily for 90 days. Dispense:  270 Cap Refill:  2  
 omeprazole (PRILOSEC) 20 mg capsule Sig: Take 1 Cap by mouth daily. Dispense:  90 Cap Refill:  3 Total time: 30min Counseling / coordination time: 5min contact with [facility] translation  
> 50% counseling / coordination?:

## 2018-10-10 NOTE — TELEPHONE ENCOUNTER
Called [phone] not able to leave a voicemail because the voicemail box is full.  Calling to advise of appt for 10/16/18 arrival timeframe between 11:30am-1pm
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
## 2018-10-17 NOTE — PROGRESS NOTES
Mrs. Christopher Hurtado is more anemic. Need to go and do more testing. Set up f/u please within a week.
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
## 2018-10-25 NOTE — PROGRESS NOTES
Home Based [address] [facility]  
[phone] Date of Current Visit: 10/24/18 SUMMARY:  
For full summary of patient's condition, please see Home Based Primary Care initial visit note on 7/2/18. This patient's chronic conditions including an intracerebral bleed (Aug 2014), interstitial lung disease with dependence on continuous oxygen and compression fracture of L4have resulted in the inability to leave the home to receive primary medical care without a significant taxing effort. Today we are visiting the patient for the following reason:  Evaluation of anemia, fatigue This patient's goals of care are: To breathe better at night This patient's resuscitation status is: 
[x] Attempt Resuscitation       [] Do Not Attempt Resuscitation DIAGNOSES & PLAN:  
 
  ICD-10-CM ICD-9-CM 1. Anemia, unspecified type D64.9 285.9 IRON PROFILE  
   CBC WITH AUTOMATED DIFF  
   METABOLIC PANEL, BASIC  
   VITAMIN B12  
   FOLATE 2. Nutritional deficiency E63.9 269.9 VITAMIN B12  
   FOLATE 3. Severe protein-calorie malnutrition (HCC)  E43 262 VITAMIN B12  
4. Dietary folate deficiency anemia  D52.0 281.2 FOLATE 5. Pulmonary fibrosis ([address] J84.10 515 Patient Instructions Dear Husam Elizondo , It was a pleasure seeing you at home today with Home Based [address] [facility] ([phone]) Your stated goal: To breath better at night Can someone call our office or me at [phone]. 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 Clark's lab results. 1.  Anemia 
-Mrs. Clark complained of increased fatigue at our last visit so I drew lab work which showed anemia which is new 
-I drew more labs today to check for causes of her anemia so we know how to treat it 
-Please call us so we can discuss the anemia 2. Weakness/Tiredness 
-Your family reported you are sleeping more and are weaker the last ffew weeks -Your vital signs are stable 
-Your exam was normal today 
-We have sent blood tests and will call with results tomorrow 3. High blood pressure 
-This is stable on your medication 
-If you become dizzy when standing let us know as you may need less medication 4. Health Maintenance - The flu and pneumonia vaccines were completed Health Maintenance Due Topic Date Due  Shingrix Vaccine Age 50> (1 of 2) 04/30/1984  GLAUCOMA SCREENING Q2Y  04/30/1999  
 
5. Advance Care Planning - Advance directives are on file - You are a FULL CODE Advance Care Planning 9/26/2016 Patient's Healthcare Decision Maker is: Verbal statement (Legal Next of Kin remains as decision maker) Primary Decision Maker Name SA WOMACK Primary Decision Maker Phone Number [phone] Primary Decision Maker Relationship to Patient Adult child Confirm Advance Directive None Patient Would Like to Complete Advance Directive No  
 
Someone from the Home Based Primary Care Team will see you again in 4 to 6 weeks If there are any concerns before that time, such as medication questions, worsening symptoms or a need to see a physician for an urgent or emergent situation; please call [phone]. A physician is also on call after our normal business hours of 8am to 5pm.  
 
In order to serve you better, please allow up to 48 hours for prescription refills to be processed. Certain medications may require more paperwork or a written prescription that you may need to  from the office. We appreciate you letting us know of any refill requests as soon as possible. Sincerely, The Home Based Primary Care Team 
 
MD Brennan Good NP Cherylyn Bi, NP Robynn Passer, RN Lionel Drone, RN Anemia: Care Instructions Your Care Instructions Anemia is a low level of red blood cells, which carry oxygen throughout your body. Many things can cause anemia.  Lack of iron is one of the most common causes. Your body needs iron to make hemoglobin, a substance in red blood cells that carries oxygen from the lungs to your body's cells. Without enough iron, the body produces fewer and smaller red blood cells. As a result, your body's cells do not get enough oxygen, and you feel tired and weak. And you may have trouble concentrating. Bleeding is the most common cause of a lack of iron. You may have heavy menstrual bleeding or bleeding caused by conditions such as ulcers, hemorrhoids, or cancer. Regular use of aspirin or other anti-inflammatory medicines (such as ibuprofen) also can cause bleeding in some people. A lack of iron in your diet also can cause anemia, especially at times when the body needs more iron, such as during pregnancy, infancy, and the teen years. Your doctor may have prescribed iron pills. It may take several months of treatment for your iron levels to return to normal. Your doctor also may suggest that you eat foods that are rich in iron, such as meat and beans. There are many other causes of anemia. It is not always due to a lack of iron. Finding the specific cause of your anemia will help your doctor find the right treatment for you. Follow-up care is a key part of your treatment and safety. Be sure to make and go to all appointments, and call your doctor if you are having problems. It's also a good idea to know your test results and keep a list of the medicines you take. How can you care for yourself at home? · Take your medicines exactly as prescribed. Call your doctor if you think you are having a problem with your medicine. · If your doctor recommends iron pills, take them as directed: ? Try to take the pills on an empty stomach about 1 hour before or 2 hours after meals. But you may need to take iron with food to avoid an upset stomach.  
? Do not take antacids or drink milk or caffeine drinks (such as coffee, tea, or cola) at the same time or within 2 hours of the time that you take your iron. They can make it hard for your body to absorb the iron. ? Vitamin C (from food or supplements) helps your body absorb iron. Try taking iron pills with a glass of orange juice or some other food that is high in vitamin C, such as citrus fruits. ? Iron pills may cause stomach problems, such as heartburn, nausea, diarrhea, constipation, and cramps. Be sure to drink plenty of fluids, and include fruits, vegetables, and fiber in your diet each day. Iron pills often make your bowel movements dark or green. ? If you forget to take an iron pill, do not take a double dose of iron the next time you take a pill. ? Keep iron pills out of the reach of small children. An overdose of iron can be very dangerous. · Follow your doctor's advice about eating iron-rich foods. These include red meat, shellfish, poultry, eggs, beans, raisins, whole-grain bread, and leafy green vegetables. · Steam vegetables to help them keep their iron content. When should you call for help? Call 911 anytime you think you may need emergency care. For example, call if: 
  · You have symptoms of a heart attack. These may include: 
? Chest pain or pressure, or a strange feeling in the chest. 
? Sweating. ? Shortness of breath. ? Nausea or vomiting. ? Pain, pressure, or a strange feeling in the back, neck, jaw, or upper belly or in one or both shoulders or arms. ? Lightheadedness or sudden weakness. ? A fast or irregular heartbeat. After you call 911, the  may tell you to chew 1 adult-strength or 2 to 4 low-dose aspirin. Wait for an ambulance. Do not try to drive yourself.  
  · You passed out (lost consciousness).  
 Call your doctor now or seek immediate medical care if: 
  · You have new or increased shortness of breath.  
  · You are dizzy or lightheaded, or you feel like you may faint.  
  · Your fatigue and weakness continue or get worse.   · You have any abnormal bleeding, such as: 
? Nosebleeds. ? Vaginal bleeding that is different (heavier, more frequent, at a different time of the month) than what you are used to. 
? Bloody or black stools, or rectal bleeding. ? Bloody or pink urine.  
 Watch closely for changes in your health, and be sure to contact your doctor if: 
  · You do not get better as expected. Where can you learn more? Go to http://jose-cheyanne.info/. Enter R301 in the search box to learn more about \"Anemia: Care Instructions. \" Current as of: May 7, 2018 Content Version: 11.8 © 7731-5279 [employer]. Care instructions adapted under license by [employer] (which disclaims liability or warranty for this information). If you have questions about a medical condition or this instruction, always ask your healthcare professional. [place] [address] any warranty or liability for your use of this information. HISTORY:  
HISTORY OBTAINED FROM: patient and patients daughter  Using [employer]  line CHIEF COMPLAINT:  
Chief Complaint Patient presents with  Anemia  Fatigue HPI/SUBJECTIVE:  
Patient is at home in bed. Her daughter is there and we speak through a telephone --they speak [place]. The patient does not answer questions and her daughter does it for her. Her daughter complains that her mother continues to be weaker and fatigued. Her lab work as part of the work-up for this revealed anemia. She has no fever, chills, increased confusion, cough, abdominal pain. She is eating well and has a BM daily which is soft. Her gramajo catheter is changed monthly. She needs no medication refills. She has a care provider during the day and ambulates to the BR with her walker and assistance. Family denies blood in stools or hematuria. Recent Fall: [x] No / [] Yes (when):   
Last bowel movement:  today REVIEW OF SYSTEMS:  
 
 The following systems were [x] reviewed / [] unable to be reviewed Systems: constitutional, ears/nose/mouth/throat, respiratory, gastrointestinal, genitourinary, musculoskeletal, integumentary, neurologic, psychiatric, endocrine. Positive findings noted below:  generalized weakness and increased fatigue for several weeks per family VITAL SIGNS, PHYSICAL EXAM & FUNCTIONAL ASSESSMENT Vital Signs: Wt Readings from Last 3 Encounters:  
07/02/18 77 lb 6.4 oz (35.1 kg) 08/13/17 88 lb (39.9 kg) 03/21/17 88 lb (39.9 kg) Temp Readings from Last 3 Encounters:  
10/24/18 97.9 °F (36.6 °C) (Oral) 10/23/18 99 °F (37.2 °C) (Temporal) 10/17/18 98.9 °F (37.2 °C) (Temporal) BP Readings from Last 3 Encounters:  
10/24/18 102/64  
10/23/18 110/66  
10/17/18 110/72 Pulse Readings from Last 3 Encounters:  
10/24/18 64  
10/23/18 71  
10/17/18 86 Physical Exam: 
 
Constitutional:  female, adv. Age, NAD, lying in bed, frail Eyes: pupils equal, anicteric, conjunctiva pink ENMT: no nasal discharge, moist mucous membranes, nasal mucosa pink without discharge Cardiovascular: regular rhythm,no m/r/g,  distal pulses intact, no edema Respiratory: breathing not labored, symmetric, wearing supplement O2 NC, distant Gastrointestinal: soft non-tender, +bowel sounds, no TTP, HSM, masses, R, G, R Musculoskeletal: no deformity, no tenderness to palpation Skin: warm, dry, normal skin turgor, no open wounds/rashes/ulcers Neurologic: following commands, moving all extremities Psychiatric: normal affect, no hallucinations :  Chambers draining clear, yellow urine Functional Assessment (description):  
Palliative Performance Scale: 60% Timed Up and Go (TUG): Not assessed Fall Risk Assessment, last 12 mths 8/15/2018 Able to walk? No  
 
 
 LAB DATA REVIEWED:  
 
Lab Results Component Value Date/Time  Hemoglobin A1c 5.0 09/01/2014 03:54 AM  
 
 No results found for: Leonid Rosales, MCA2, [address], MCAU, Ulsparkle Schneider, MCALPOCT Lab Results Component Value Date/Time TSH 2.460 07/02/2018 12:06 PM  
 
Lab Results Component Value Date/Time VITAMIN D, 25-HYDROXY 21.9 (L) 07/02/2018 12:06 PM  
   
 
Lab Results Component Value Date/Time WBC 8.1 10/16/2018 12:37 PM  
 HGB 9.9 (L) 10/16/2018 12:37 PM  
 PLATELET 382 71/34/7520 12:37 PM  
 
Lab Results Component Value Date/Time Sodium 141 10/16/2018 12:37 PM  
 Potassium 3.7 10/16/2018 12:37 PM  
 Chloride 101 10/16/2018 12:37 PM  
 CO2 24 10/16/2018 12:37 PM  
 BUN 12 10/16/2018 12:37 PM  
 Creatinine 0.62 10/16/2018 12:37 PM  
 Calcium 8.1 (L) 10/16/2018 12:37 PM  
 Magnesium 1.8 07/18/2016 02:34 AM  
 Phosphorus 2.8 07/18/2016 02:34 AM  
  
Lab Results Component Value Date/Time AST (SGOT) 29 10/16/2018 12:37 PM  
 Alk. phosphatase 105 10/16/2018 12:37 PM  
 Protein, total 6.1 10/16/2018 12:37 PM  
 Albumin 3.3 (L) 10/16/2018 12:37 PM  
 Globulin 3.7 04/05/2017 12:05 PM  
 
Lab Results Component Value Date/Time Iron 34 (L) 05/05/2014 02:50 AM  
 TIBC 251 05/05/2014 02:50 AM  
 Iron % saturation 14 (L) 05/05/2014 02:50 AM  
 Ferritin 90 05/05/2014 02:50 AM  
  
 
 MEDICATIONS & PRESCRIPTIONS Allergies Allergen Reactions  Aspirin Nausea Only Current Outpatient Medications Medication Sig  cholecalciferol (VITAMIN D3) 1,000 unit tablet Take 2 Tabs by mouth daily.  gabapentin (NEURONTIN) 100 mg capsule Take 1 Cap by mouth three (3) times daily for 90 days.  omeprazole (PRILOSEC) 20 mg capsule Take 1 Cap by mouth daily.  amLODIPine (NORVASC) 5 mg tablet TAKE 1 TABLET BY MOUTH EVERY DAY  
 OXYGEN-AIR DELIVERY SYSTEMS Take 2 L/min by inhalation continuous.  metoprolol tartrate (LOPRESSOR) 25 mg tablet Take 25 mg by mouth two (2) times a day.  ibuprofen (MOTRIN) 200 mg tablet Take 200 mg by mouth every six (6) hours as needed for Pain.  acetaminophen (TYLENOL) 325 mg tablet Take 2 Tabs by mouth every four (4) hours as needed for Fever. No current facility-administered medications for this visit. No orders of the defined types were placed in this encounter. Total time: 30 min Counseling / coordination time: 10 min contact with [facility] , multiple attempts to contact family member who can speak English and are POA's for the patient 
> 50% counseling / coordination?: No

## 2018-11-02 NOTE — PROGRESS NOTES
Home Based Primary Care [facility] Supportive Services   (previously: At Home)  [phone]    Interdisciplinary Note    [facility] Team Members: Dr. Pierce Rodriguez, Brock Lutz, NP; José Orta, EVANGELIST; Dottie Enriquez, JOANNA; Nik Putnam, JOANNA, Brenda Holstein, SW;     Diagnosis: Intracerebral bleed (Aug 2014), interstitial lung disease with dependence on continuous (on O2 at 4L), HTN, compression fx L4 vertebra Feb 2016 with back pain, GERD, renal calculi (chronic gramajo), recurrent UTI's    Current status summary: Ms. Grady Zapata is an 80year old with a history of  intracerebral bleed (Aug 2014), interstitial lung disease (on O2 at 4L) continuous. Medication Management: N/A    Last admission/ED visit: Greater than 6 months    Last Home Based Primary Care visit: 10/24/18    Action Items:  1. NP with meet with family member to discuss hospice care. 2. Co-ordinate visit with cultural service Danitza Saldaña)    Follow up appointment pending co-ordination with family due to language barrier.     Advance Care Planning:  Advance Care Planning 9/26/2016   Patient's Healthcare Decision Maker is: Verbal statement (Legal Next of Kin remains as decision maker)   Primary Decision Maker Name SA WOMACK   Primary Decision Maker Phone Number [phone]   Primary Decision Maker Relationship to Patient Adult child   Confirm Advance Directive None   Patient Would Like to Complete Advance Directive No       Health Maintenance:  Health Maintenance Due   Topic Date Due    Shingrix Vaccine Age 50> (1 of 2) 04/30/1984    GLAUCOMA SCREENING Q2Y  04/30/1999

## 2018-11-07 NOTE — PROGRESS NOTES
Home Based Primary Care Social Work Note: Family Meeting    In attendance: Pt, ex-daughter-in-law Chiara Mishra, daughter, private duty aide Aura Garner from [facility] 9 of Visit: To discuss goals of care, current pt condition, provide basic hospice information    Narrative: SW and NP Jerome Joyce met with pt's ex-daughter-in-law Chiara Mishra, pt's dtr, pt, and private duty aide Aura Garner from [facility], in the home today. Pt was in hospital bed set up in the living room. Pt was awake, does not speak [place] but Enriqueta translated to pt. Leigha Estrada stated that pt is confused due to dementia. Pt still believes her son is asleep in the bedroom when he actually passed away a few months ago. Pt's son who passed away is Enriqueta's ex  ( in 2000's) but Leigha Estrada is still close to pt and the family. Pt's daughter and son share decision making responsibilities as they are NOK, pt does not have a completed Advance Directive and cannot complete one due to advanced dementia. Dtr states that she and her brother (lives in [place]) agree with current pt goals of care, to keep mom at home and comfortable. Plan: NP to contact hospice and inquire about arranging for a weekend hospice information session if possible, as pt's son works during the week but is usually available on weekends. Language line to be used during hospice discussion, as Leigha Estrada mentioned that son and daughter may have questions about specifics of hospice care. Pt's primary language is [employer].     Taylor Chisholm, GENO, RYLANDW    Home Based Primary Care  [phone]

## 2018-11-08 NOTE — PROGRESS NOTES
Home Based [address]  
[phone] Date of Current Visit: 11/07/18 SUMMARY:  
For full summary of patient's condition, please see Home Based Primary Care initial visit note on 7/2/18. This patient's chronic conditions including an intracerebral bleed (Aug 2014), interstitial lung disease with dependence on continuous oxygen and compression fracture of L4have resulted in the inability to leave the home to receive primary medical care without a significant taxing effort. Today we are visiting the patient for the following reason:   Discuss care wishes with family, new diagnosis of anemia, hx of pulmonary fibrosis and dementia This patient's goals of care are: To breathe better at night This patient's resuscitation status is: 
[x] Attempt Resuscitation       [] Do Not Attempt Resuscitation DIAGNOSES & PLAN:  
 
  ICD-10-CM ICD-9-CM 1. Pulmonary fibrosis ([facility].) J84.10 [address] [place] [address] 2. Anemia, unspecified type D64.9 285.9 3. Severe protein-calorie malnutrition ([facility]  E43 262 4. Late onset Alzheimer's disease without behavioral disturbance G30.1 331.0 F02.80 294.10 Patient Instructions Dear Ronaldo Nolan , It was a pleasure seeing you at home today with Home Based [address] [facility] ([phone]) Your stated goal: To breath better at night 1. Anemia 
-Mrs. Clark complained of increased fatigue at our last visit so I drew lab work which showed significant anemia which is new 
-We met today to discuss whether it would be hers and the family's wish for her to have an extensive work-up or focus on comfort 
-comfort was chosen and we have ordered a hospice consult 2. Pulmonary fibrosis -This is a nontreatable illness and is probably responsible for her weight loss 3. Dementia 
-This is also a nontreatable illness 4. Health Maintenance - The flu and pneumonia vaccines were completed Health Maintenance Due  
 Topic Date Due  Shingrix Vaccine Age 50> (1 of 2) 04/30/1984  GLAUCOMA SCREENING Q2Y  04/30/1999  
 
5. Advance Care Planning - Advance directives are on file - You are a FULL CODE but hospice will discuss the  Do Not Resuscitate paperwork and will have you review and sign this when your brother is with you as you would both be her decisions makers as there is no POA. Advance Care Planning 9/26/2016 Patient's Healthcare Decision Maker is: Verbal statement (Legal Next of Kin remains as decision maker) Primary Decision Maker Name SA WOMACK Primary Decision Maker Phone Number [phone] Primary Decision Maker Relationship to Patient Adult child Confirm Advance Directive None Patient Would Like to Complete Advance Directive No  
 
Someone from the Home Based Primary Care Team will see you again if you decide against hospice care. If there are any concerns before that time, such as medication questions, worsening symptoms or a need to see a physician for an urgent or emergent situation; please call [phone]. A physician is also on call after our normal business hours of 8am to 5pm.  
 
In order to serve you better, please allow up to 48 hours for prescription refills to be processed. Certain medications may require more paperwork or a written prescription that you may need to  from the office. We appreciate you letting us know of any refill requests as soon as possible. Sincerely, The Home Based Primary Care Team 
 
MD Adan Wall, EVANGELIST Matthews, RN Nicolás Monk, RN Hospice: Care Instructions Your Care Instructions Hospice care provides medical treatment to relieve symptoms at the end of life. The goal is to keep you comfortable, not to try to cure you. Hospice care does not speed up or lengthen dying. It focuses on easing pain and other symptoms. Hospice caregivers want to enhance your quality of life. Hospice care also offers emotional help and spiritual support when you are dying. It also helps family members care for a loved one who is dying. Hospice care can help you review your life, say important things to family and friends, and explore spiritual issues. Hospice also helps your family and friends deal with their grief after you die. You can use hospice care if your illness cannot be cured and doctors believe you have no more than 6 months to live. You do not need to be confined to a bed or in a hospital to benefit from this type of care. The hospice team includes nurses, counselors, therapists, social workers, pastors, home health aides, and trained volunteers. You can get care in your own home or in a hospice center. Some hospice workers also go to nursing homes or hospitals. How can you care for yourself at home? · Prepare a list of advance directives. These are instructions to your doctor and family members about what kind of care you want if you become unable to speak or express yourself. · Find out if your health insurance covers hospice care. · Find hospice programs in your area. People who can help include your doctor, your state health department, and your insurance company. · Decide what kinds of hospice services you want. It helps to know what you want before you enter a hospice program. 
· Think about some questions when preparing for hospice care. ? Who do you want to make decisions about your medical care if you are not able to? Many people choose their spouse, child, or doctor. ? What are you most afraid of that might happen? You might be afraid of having pain or losing your independence. Let your hospice team know your fears. The team can help you deal with them. ? Where would you prefer to die? Choices include your home, a hospital, or a nursing home. ? Do you want to donate organs when you die? Make sure that your family clearly understands this. ? Do you want any Quaker rites or practices to be done before you die? Let your hospice team know what you want. Where can you learn more? Go to http://jose-cheyanne.info/. Enter O457 in the search box to learn more about \"Hospice: Care Instructions. \" Current as of: 2018 Content Version: 11.8 © 4089-3606 [employer]. Care instructions adapted under license by [employer] (which disclaims liability or warranty for this information). If you have questions about a medical condition or this instruction, always ask your healthcare professional. [place] [address] any warranty or liability for your use of this information. HISTORY:  
HISTORY OBTAINED FROM: patient and patients daughter  Using [employer]  line CHIEF COMPLAINT:  
Chief Complaint Patient presents with  Anemia  Pulmonary Fibrosis  Dementia HPI/SUBJECTIVE:  
Patient, her daughter and daughter-in-law met with MENDEZ Calhoun and me today. Patient has been declining for the past two years. She had c/o fatigue at a recent visit and labwork showed a hgb of 9.8 with priors being around 12. Iron is quite low. Patient did not tolerate iron well in the past.  Also, this could indicate blood loss. I had wanted to meet with family about this and if they would want a full work-up. The patient had advancing dementia and pulmonary fibrosis. Past wishes had been to be at home. Patient's daughter-in-law shared that the patient's son is  of supranuclear palsy but the patient still thinks he is sleeping in the other room. She does eat fairly well per daughter, she does not c/o pain. Her exercise tolerance is decreasing still secondary to hypoxia. She is oxygen dependent. Patient was not able to add to the conversation secondary to dementia.   She did insist on sitting up against a pillow in bed because it would be rude to be lying down when guests were sitting. Recent Fall: [x] No / [] Yes (when):   
Last bowel movement:  yesterday REVIEW OF SYSTEMS:  
 
The following systems were [x] reviewed / [] unable to be reviewed Systems: constitutional, ears/nose/mouth/throat, respiratory, gastrointestinal, genitourinary, musculoskeletal, integumentary, neurologic, psychiatric, endocrine. Positive findings noted below:  generalized weakness and increased fatigue for several weeks per family VITAL SIGNS, PHYSICAL EXAM & FUNCTIONAL ASSESSMENT Vital Signs: Wt Readings from Last 3 Encounters:  
07/02/18 77 lb 6.4 oz (35.1 kg) 08/13/17 88 lb (39.9 kg) 03/21/17 88 lb (39.9 kg) Temp Readings from Last 3 Encounters:  
11/07/18 98.2 °F (36.8 °C) (Oral) 11/06/18 98.6 °F (37 °C) (Temporal) 10/30/18 97.8 °F (36.6 °C) (Temporal) BP Readings from Last 3 Encounters:  
11/07/18 100/58  
11/06/18 128/72  
10/30/18 106/62 Pulse Readings from Last 3 Encounters:  
11/07/18 84  
11/06/18 71  
10/30/18 81 Physical Exam: 
 
Constitutional:  female, adv. Age, NAD, lying in bed, frail Eyes: pupils equal, anicteric, conjunctiva pink ENMT: no nasal discharge, moist mucous membranes, nasal mucosa pink without discharge Cardiovascular: regular rhythm,no m/r/g,  distal pulses intact, no edema Respiratory: breathing not labored, symmetric, wearing supplement O2 NC, distant Gastrointestinal: soft non-tender, +bowel sounds, no TTP, HSM, masses, R, G, R Musculoskeletal: no deformity, no tenderness to palpation Skin: warm, dry, normal skin turgor, no open wounds/rashes/ulcers Neurologic: following simple commands, moving all extremities Psychiatric: normal affect, no hallucinations :  Chambers draining clear, yellow urine Functional Assessment (description):  
Palliative Performance Scale: 50% Timed Up and Go (TUG): Not assessed Fall Risk Assessment, last 12 mths 8/15/2018 Able to walk? No  
 
 
 LAB DATA REVIEWED:  
 
Lab Results Component Value Date/Time Hemoglobin A1c 5.0 09/01/2014 03:54 AM  
 
No results found for: Nancy Meigs, MCA2, MCA3, MCAU, MCAU2, MCALPOCT Lab Results Component Value Date/Time TSH 2.460 07/02/2018 12:06 PM  
 
Lab Results Component Value Date/Time VITAMIN D, 25-HYDROXY 21.9 (L) 07/02/2018 12:06 PM  
   
 
Lab Results Component Value Date/Time WBC 8.5 10/24/2018 04:30 PM  
 HGB 9.8 (L) 10/24/2018 04:30 PM  
 PLATELET 939 59/27/7197 04:30 PM  
 
Lab Results Component Value Date/Time Sodium 141 10/24/2018 04:30 PM  
 Potassium 4.0 10/24/2018 04:30 PM  
 Chloride 102 10/24/2018 04:30 PM  
 CO2 26 10/24/2018 04:30 PM  
 BUN 11 10/24/2018 04:30 PM  
 Creatinine 0.79 10/24/2018 04:30 PM  
 Calcium 8.4 (L) 10/24/2018 04:30 PM  
 Magnesium 1.8 07/18/2016 02:34 AM  
 Phosphorus 2.8 07/18/2016 02:34 AM  
  
Lab Results Component Value Date/Time AST (SGOT) 29 10/16/2018 12:37 PM  
 Alk. phosphatase 105 10/16/2018 12:37 PM  
 Protein, total 6.1 10/16/2018 12:37 PM  
 Albumin 3.3 (L) 10/16/2018 12:37 PM  
 Globulin 3.7 04/05/2017 12:05 PM  
 
Lab Results Component Value Date/Time Iron 26 (L) 10/24/2018 04:30 PM  
 TIBC 315 10/24/2018 04:30 PM  
 Iron % saturation 8 (LL) 10/24/2018 04:30 PM  
 Ferritin 90 05/05/2014 02:50 AM  
  
 
 MEDICATIONS & PRESCRIPTIONS Allergies Allergen Reactions  Aspirin Nausea Only Current Outpatient Medications Medication Sig  cholecalciferol (VITAMIN D3) 1,000 unit tablet Take 2 Tabs by mouth daily.  gabapentin (NEURONTIN) 100 mg capsule Take 1 Cap by mouth three (3) times daily for 90 days.  omeprazole (PRILOSEC) 20 mg capsule Take 1 Cap by mouth daily.  amLODIPine (NORVASC) 5 mg tablet TAKE 1 TABLET BY MOUTH EVERY DAY  
 OXYGEN-AIR DELIVERY SYSTEMS Take 2 L/min by inhalation continuous.  metoprolol tartrate (LOPRESSOR) 25 mg tablet Take 25 mg by mouth two (2) times a day.  ibuprofen (MOTRIN) 200 mg tablet Take 200 mg by mouth every six (6) hours as needed for Pain.  acetaminophen (TYLENOL) 325 mg tablet Take 2 Tabs by mouth every four (4) hours as needed for Fever. No current facility-administered medications for this visit. No orders of the defined types were placed in this encounter. Total time: 90 min Counseling / coordination time:  70 min on discussion on care goals, hospice; coordinated with hospice and arranged for an intake on a Sunday when both siblings can be present 
> 50% counseling / coordination?: yes

## 2018-11-08 NOTE — PATIENT INSTRUCTIONS
Dear Dayami Solis , It was a pleasure seeing you at home today with Home Based [facility] ([phone]) Your stated goal: To breath better at night 1. Anemia 
-Mrs. Clark complained of increased fatigue at our last visit so I drew lab work which showed significant anemia which is new 
-We met today to discuss whether it would be hers and the family's wish for her to have an extensive work-up or focus on comfort 
-comfort was chosen and we have ordered a hospice consult 2. Pulmonary fibrosis -This is a nontreatable illness and is probably responsible for her weight loss 3. Dementia 
-This is also a nontreatable illness 4. Health Maintenance- The flu and pneumonia vaccines were completed Health Maintenance Due Topic Date Due  Shingrix Vaccine Age 50> (1 of 2) 04/30/1984  GLAUCOMA SCREENING Q2Y  04/30/1999  
 
5. Advance Care Planning - Advance directives are on file - You are a FULL CODE but hospice will discuss the  Do Not Resuscitate paperwork and will have you review and sign this when your brother is with you as you would both be her decisions makers as there is no POA. Advance Care Planning 9/26/2016 Patient's Healthcare Decision Maker is: Verbal statement (Legal Next of Kin remains as decision maker) Primary Decision Maker Name SA WOMACK Primary Decision Maker Phone Number [phone] Primary Decision Maker Relationship to Patient Adult child Confirm Advance Directive None Patient Would Like to Complete Advance Directive No  
 
Someone from the Home Based Primary Care Team will see you again if you decide against hospice care. If there are any concerns before that time, such as medication questions, worsening symptoms or a need to see a physician for an urgent or emergent situation; please call [phone].  A physician is also on call after our normal business hours of 8am to 5pm.  
 
In order to serve you better, please allow up to 48 hours for prescription refills to be processed. Certain medications may require more paperwork or a written prescription that you may need to  from the office. We appreciate you letting us know of any refill requests as soon as possible. Sincerely, The Home Based Primary Care Team 
 
MD Lauro Holt, EVANGELIST Claudio RN Vonzell Stands, RN Hospice: Care Instructions Your Care Instructions Hospice care provides medical treatment to relieve symptoms at the end of life. The goal is to keep you comfortable, not to try to cure you. Hospice care does not speed up or lengthen dying. It focuses on easing pain and other symptoms. Hospice caregivers want to enhance your quality of life. Hospice care also offers emotional help and spiritual support when you are dying. It also helps family members care for a loved one who is dying. Hospice care can help you review your life, say important things to family and friends, and explore spiritual issues. Hospice also helps your family and friends deal with their grief after you die. You can use hospice care if your illness cannot be cured and doctors believe you have no more than 6 months to live. You do not need to be confined to a bed or in a hospital to benefit from this type of care. The hospice team includes nurses, counselors, therapists, social workers, pastors, home health aides, and trained volunteers. You can get care in your own home or in a hospice center. Some hospice workers also go to nursing homes or hospitals. How can you care for yourself at home? · Prepare a list of advance directives. These are instructions to your doctor and family members about what kind of care you want if you become unable to speak or express yourself. · Find out if your health insurance covers hospice care. · Find hospice programs in your area.  People who can help include your doctor, your state health department, and your insurance company. · Decide what kinds of hospice services you want. It helps to know what you want before you enter a hospice program. 
· Think about some questions when preparing for hospice care. ? Who do you want to make decisions about your medical care if you are not able to? Many people choose their spouse, child, or doctor. ? What are you most afraid of that might happen? You might be afraid of having pain or losing your independence. Let your hospice team know your fears. The team can help you deal with them. ? Where would you prefer to die? Choices include your home, a hospital, or a nursing home. ? Do you want to donate organs when you die? Make sure that your family clearly understands this. ? Do you want any Caodaism rites or practices to be done before you die? Let your hospice team know what you want. Where can you learn more? Go to http://jose-cheyanne.info/. Enter D559 in the search box to learn more about \"Hospice: Care Instructions. \" Current as of: April 19, 2018 Content Version: 11.8 © 3220-0733 [employer]. Care instructions adapted under license by [employer] (which disclaims liability or warranty for this information). If you have questions about a medical condition or this instruction, always ask your healthcare professional. [address] any warranty or liability for your use of this information.

## 2019-01-01 ENCOUNTER — HOME CARE VISIT (OUTPATIENT)
Dept: SCHEDULING | Facility: HOME HEALTH | Age: 84
End: 2019-01-01
Payer: MEDICARE

## 2019-01-01 ENCOUNTER — HOME CARE VISIT (OUTPATIENT)
Dept: HOSPICE | Facility: HOSPICE | Age: 84
End: 2019-01-01
Payer: MEDICARE

## 2019-01-01 VITALS
HEART RATE: 76 BPM | DIASTOLIC BLOOD PRESSURE: 80 MMHG | OXYGEN SATURATION: 99 % | SYSTOLIC BLOOD PRESSURE: 118 MMHG | RESPIRATION RATE: 18 BRPM

## 2019-01-01 VITALS
DIASTOLIC BLOOD PRESSURE: 70 MMHG | RESPIRATION RATE: 16 BRPM | OXYGEN SATURATION: 99 % | HEART RATE: 89 BPM | SYSTOLIC BLOOD PRESSURE: 110 MMHG

## 2019-01-01 VITALS
RESPIRATION RATE: 20 BRPM | DIASTOLIC BLOOD PRESSURE: 80 MMHG | HEART RATE: 68 BPM | OXYGEN SATURATION: 95 % | SYSTOLIC BLOOD PRESSURE: 126 MMHG

## 2019-01-01 VITALS
RESPIRATION RATE: 20 BRPM | DIASTOLIC BLOOD PRESSURE: 78 MMHG | HEART RATE: 85 BPM | OXYGEN SATURATION: 99 % | SYSTOLIC BLOOD PRESSURE: 120 MMHG

## 2019-01-01 VITALS
DIASTOLIC BLOOD PRESSURE: 70 MMHG | OXYGEN SATURATION: 99 % | SYSTOLIC BLOOD PRESSURE: 120 MMHG | HEART RATE: 92 BPM | RESPIRATION RATE: 26 BRPM

## 2019-01-01 VITALS
OXYGEN SATURATION: 100 % | HEART RATE: 67 BPM | SYSTOLIC BLOOD PRESSURE: 158 MMHG | DIASTOLIC BLOOD PRESSURE: 82 MMHG | RESPIRATION RATE: 20 BRPM

## 2019-01-01 VITALS — DIASTOLIC BLOOD PRESSURE: 60 MMHG | SYSTOLIC BLOOD PRESSURE: 110 MMHG | RESPIRATION RATE: 20 BRPM | HEART RATE: 96 BPM

## 2019-01-01 VITALS — HEART RATE: 124 BPM | RESPIRATION RATE: 20 BRPM

## 2019-01-01 VITALS
SYSTOLIC BLOOD PRESSURE: 124 MMHG | RESPIRATION RATE: 18 BRPM | DIASTOLIC BLOOD PRESSURE: 76 MMHG | OXYGEN SATURATION: 99 % | HEART RATE: 68 BPM

## 2019-01-01 VITALS
SYSTOLIC BLOOD PRESSURE: 110 MMHG | DIASTOLIC BLOOD PRESSURE: 68 MMHG | RESPIRATION RATE: 20 BRPM | OXYGEN SATURATION: 99 % | HEART RATE: 70 BPM

## 2019-01-01 VITALS — DIASTOLIC BLOOD PRESSURE: 80 MMHG | RESPIRATION RATE: 20 BRPM | SYSTOLIC BLOOD PRESSURE: 130 MMHG | HEART RATE: 88 BPM

## 2019-01-01 VITALS — RESPIRATION RATE: 20 BRPM | HEART RATE: 68 BPM

## 2019-01-01 VITALS
OXYGEN SATURATION: 100 % | SYSTOLIC BLOOD PRESSURE: 140 MMHG | HEART RATE: 97 BPM | DIASTOLIC BLOOD PRESSURE: 72 MMHG | RESPIRATION RATE: 18 BRPM

## 2019-01-01 VITALS — DIASTOLIC BLOOD PRESSURE: 72 MMHG | HEART RATE: 76 BPM | SYSTOLIC BLOOD PRESSURE: 148 MMHG | RESPIRATION RATE: 20 BRPM

## 2019-01-01 VITALS — RESPIRATION RATE: 24 BRPM | HEART RATE: 104 BPM | OXYGEN SATURATION: 95 %

## 2019-01-01 VITALS
RESPIRATION RATE: 20 BRPM | OXYGEN SATURATION: 100 % | DIASTOLIC BLOOD PRESSURE: 80 MMHG | HEART RATE: 87 BPM | SYSTOLIC BLOOD PRESSURE: 118 MMHG

## 2019-01-01 PROCEDURE — G0300 HHS/HOSPICE OF LPN EA 15 MIN: HCPCS

## 2019-01-01 PROCEDURE — G0156 HHCP-SVS OF AIDE,EA 15 MIN: HCPCS

## 2019-01-01 PROCEDURE — T4541 LARGE DISPOSABLE UNDERPAD: HCPCS

## 2019-01-01 PROCEDURE — 0651 HSPC ROUTINE HOME CARE

## 2019-01-01 PROCEDURE — HOSPICE MEDICATION HC HH HOSPICE MEDICATION

## 2019-01-01 PROCEDURE — G0299 HHS/HOSPICE OF RN EA 15 MIN: HCPCS

## 2019-01-01 PROCEDURE — A4927 NON-STERILE GLOVES: HCPCS

## 2019-01-01 PROCEDURE — A9270 NON-COVERED ITEM OR SERVICE: HCPCS

## 2019-01-01 PROCEDURE — T4526 ADULT SIZE PULL-ON MED: HCPCS

## 2019-01-01 PROCEDURE — A4314 CATH W/DRAINAGE 2-WAY LATEX: HCPCS

## 2019-01-01 PROCEDURE — HHS10554 SHAMPOO/BODY WASH 8 OZ ALOE VESTA

## 2019-01-01 PROCEDURE — G0155 HHCP-SVS OF CSW,EA 15 MIN: HCPCS

## 2019-01-01 PROCEDURE — A4333 URINARY CATH ANCHOR DEVICE: HCPCS

## 2019-03-11 ENCOUNTER — HOME CARE VISIT (OUTPATIENT)
Dept: HOSPICE | Facility: HOSPICE | Age: 84
End: 2019-03-11
Payer: MEDICARE

## 2019-03-11 VITALS — RESPIRATION RATE: 14 BRPM

## 2019-04-17 NOTE — PROGRESS NOTES
No return call, will send registered letter Pt c/o dark tarry stools with nv X 1 wk. Pt reports SOB with activity. Pt denies cp. Pt denies fever or chills. Pt denies difficulty or burning urination. Pt has hx of ulcers. wcm

## 2020-06-20 NOTE — TELEPHONE ENCOUNTER
LATE ENTRY -    Received incoming call from Jacky Lagos MD for case discussion regarding patient. 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 Discussed potential for repeat voiding trial in the home. Dr. Peter Severin agreeable with attempt to be made. In office attempts previously made by artificially filling bladder then removing catheter. Agreeable to on-going coordination of care with this patient.
see chief complaint quote